# Patient Record
Sex: MALE | Race: WHITE | NOT HISPANIC OR LATINO | Employment: UNEMPLOYED | URBAN - METROPOLITAN AREA
[De-identification: names, ages, dates, MRNs, and addresses within clinical notes are randomized per-mention and may not be internally consistent; named-entity substitution may affect disease eponyms.]

---

## 2018-10-30 ENCOUNTER — HOSPITAL ENCOUNTER (OUTPATIENT)
Dept: WOUND CARE | Facility: HOSPITAL | Age: 58
Discharge: HOME | End: 2018-10-30
Attending: SURGERY | Admitting: SURGERY

## 2018-11-02 ENCOUNTER — HOSPITAL ENCOUNTER (OUTPATIENT)
Dept: WOUND CARE | Facility: HOSPITAL | Age: 58
Discharge: HOME | End: 2018-11-02
Attending: SURGERY | Admitting: SURGERY

## 2018-11-06 ENCOUNTER — HOSPITAL ENCOUNTER (OUTPATIENT)
Dept: WOUND CARE | Facility: HOSPITAL | Age: 58
Discharge: HOME | End: 2018-11-06
Attending: SURGERY | Admitting: SURGERY

## 2018-11-13 ENCOUNTER — HOSPITAL ENCOUNTER (OUTPATIENT)
Dept: WOUND CARE | Facility: HOSPITAL | Age: 58
Discharge: HOME | End: 2018-11-13
Attending: SURGERY | Admitting: SURGERY

## 2018-11-20 ENCOUNTER — HOSPITAL ENCOUNTER (OUTPATIENT)
Dept: WOUND CARE | Facility: HOSPITAL | Age: 58
Discharge: HOME | End: 2018-11-20
Attending: SURGERY | Admitting: SURGERY

## 2018-11-28 ENCOUNTER — HOSPITAL ENCOUNTER (OUTPATIENT)
Dept: WOUND CARE | Facility: HOSPITAL | Age: 58
Discharge: HOME | End: 2018-11-28
Attending: SURGERY | Admitting: SURGERY

## 2018-12-04 ENCOUNTER — HOSPITAL ENCOUNTER (OUTPATIENT)
Dept: WOUND CARE | Facility: HOSPITAL | Age: 58
Discharge: HOME | End: 2018-12-04
Attending: SURGERY | Admitting: SURGERY

## 2018-12-18 ENCOUNTER — HOSPITAL ENCOUNTER (OUTPATIENT)
Dept: WOUND CARE | Facility: HOSPITAL | Age: 58
Discharge: HOME | End: 2018-12-18
Attending: SURGERY | Admitting: SURGERY

## 2018-12-21 ENCOUNTER — HOSPITAL ENCOUNTER (OUTPATIENT)
Dept: WOUND CARE | Facility: HOSPITAL | Age: 58
Discharge: HOME | End: 2018-12-21
Attending: SURGERY | Admitting: SURGERY

## 2018-12-28 ENCOUNTER — HOSPITAL ENCOUNTER (OUTPATIENT)
Dept: WOUND CARE | Facility: HOSPITAL | Age: 58
Discharge: HOME | End: 2018-12-28
Attending: SURGERY | Admitting: SURGERY

## 2019-01-03 ENCOUNTER — HOSPITAL ENCOUNTER (OUTPATIENT)
Dept: WOUND CARE | Facility: HOSPITAL | Age: 59
Discharge: HOME | End: 2019-01-03
Attending: SURGERY | Admitting: SURGERY

## 2019-01-04 ENCOUNTER — HOSPITAL ENCOUNTER (OUTPATIENT)
Dept: WOUND CARE | Facility: HOSPITAL | Age: 59
Discharge: HOME | End: 2019-01-04
Attending: SURGERY | Admitting: SURGERY

## 2019-01-10 ENCOUNTER — HOSPITAL ENCOUNTER (OUTPATIENT)
Dept: WOUND CARE | Facility: HOSPITAL | Age: 59
Discharge: HOME | End: 2019-01-10
Attending: SURGERY | Admitting: SURGERY

## 2019-10-07 ENCOUNTER — OFFICE VISIT (OUTPATIENT)
Dept: WOUND CARE | Facility: HOSPITAL | Age: 59
End: 2019-10-07

## 2019-10-07 PROCEDURE — G0463 HOSPITAL OUTPT CLINIC VISIT: HCPCS

## 2019-10-10 ENCOUNTER — OFFICE VISIT (OUTPATIENT)
Dept: WOUND CARE | Facility: HOSPITAL | Age: 59
End: 2019-10-10

## 2019-10-10 DIAGNOSIS — L97.511 DIABETIC ULCER OF RIGHT FOOT ASSOCIATED WITH TYPE 2 DIABETES MELLITUS, LIMITED TO BREAKDOWN OF SKIN, UNSPECIFIED PART OF FOOT (HCC): ICD-10-CM

## 2019-10-10 DIAGNOSIS — E11.621 DIABETIC ULCER OF RIGHT FOOT ASSOCIATED WITH TYPE 2 DIABETES MELLITUS, LIMITED TO BREAKDOWN OF SKIN, UNSPECIFIED PART OF FOOT (HCC): ICD-10-CM

## 2019-10-10 DIAGNOSIS — L97.511 RIGHT FOOT ULCER, LIMITED TO BREAKDOWN OF SKIN (HCC): ICD-10-CM

## 2019-10-10 DIAGNOSIS — E66.01 MORBID OBESITY (HCC): ICD-10-CM

## 2019-10-10 PROCEDURE — 29445 APPL RIGID TOT CNTC LEG CAST: CPT | Performed by: NURSE PRACTITIONER

## 2019-10-14 ENCOUNTER — OFFICE VISIT (OUTPATIENT)
Dept: WOUND CARE | Facility: HOSPITAL | Age: 59
End: 2019-10-14

## 2019-10-21 ENCOUNTER — OFFICE VISIT (OUTPATIENT)
Dept: WOUND CARE | Facility: HOSPITAL | Age: 59
End: 2019-10-21

## 2019-10-21 PROCEDURE — G0463 HOSPITAL OUTPT CLINIC VISIT: HCPCS

## 2019-11-14 ENCOUNTER — LAB REQUISITION (OUTPATIENT)
Dept: LAB | Facility: HOSPITAL | Age: 59
End: 2019-11-14

## 2019-11-14 ENCOUNTER — HOSPITAL ENCOUNTER (INPATIENT)
Facility: HOSPITAL | Age: 59
LOS: 5 days | Discharge: HOME | End: 2019-11-19
Attending: HOSPITALIST | Admitting: HOSPITALIST

## 2019-11-14 ENCOUNTER — OFFICE VISIT (OUTPATIENT)
Dept: WOUND CARE | Facility: HOSPITAL | Age: 59
End: 2019-11-14

## 2019-11-14 DIAGNOSIS — L97.511 NON-PRESSURE CHRONIC ULCER OF OTHER PART OF RIGHT FOOT LIMITED TO BREAKDOWN OF SKIN (HCC): ICD-10-CM

## 2019-11-14 PROBLEM — L97.509 DIABETIC FOOT ULCER: Status: ACTIVE | Noted: 2019-11-14

## 2019-11-14 PROBLEM — E11.621 DIABETIC FOOT ULCER (HCC): Status: ACTIVE | Noted: 2019-11-14

## 2019-11-14 LAB — GLUCOSE BLDC GLUCOMTR-MCNC: 116 MG/DL (ref 70–105)

## 2019-11-14 PROCEDURE — 83605 ASSAY OF LACTIC ACID: CPT | Performed by: HOSPITALIST

## 2019-11-14 PROCEDURE — 86140 C-REACTIVE PROTEIN: CPT | Performed by: HOSPITALIST

## 2019-11-14 PROCEDURE — 99223 1ST HOSP IP/OBS HIGH 75: CPT | Performed by: HOSPITALIST

## 2019-11-14 PROCEDURE — G0378 HOSPITAL OBSERVATION PER HR: HCPCS

## 2019-11-14 PROCEDURE — 82550 ASSAY OF CK (CPK): CPT | Performed by: HOSPITALIST

## 2019-11-14 PROCEDURE — 84145 PROCALCITONIN (PCT): CPT | Performed by: HOSPITALIST

## 2019-11-14 PROCEDURE — 87040 BLOOD CULTURE FOR BACTERIA: CPT | Performed by: HOSPITALIST

## 2019-11-14 PROCEDURE — 87070 CULTURE OTHR SPECIMN AEROBIC: CPT | Performed by: SURGERY

## 2019-11-14 PROCEDURE — 87205 SMEAR GRAM STAIN: CPT | Performed by: SURGERY

## 2019-11-14 PROCEDURE — 87147 CULTURE TYPE IMMUNOLOGIC: CPT | Performed by: SURGERY

## 2019-11-14 PROCEDURE — 85025 COMPLETE CBC W/AUTO DIFF WBC: CPT | Performed by: HOSPITALIST

## 2019-11-14 PROCEDURE — 85652 RBC SED RATE AUTOMATED: CPT | Performed by: HOSPITALIST

## 2019-11-14 PROCEDURE — G0463 HOSPITAL OUTPT CLINIC VISIT: HCPCS

## 2019-11-14 PROCEDURE — 87186 SC STD MICRODIL/AGAR DIL: CPT | Performed by: SURGERY

## 2019-11-14 PROCEDURE — 82962 GLUCOSE BLOOD TEST: CPT | Performed by: SURGERY

## 2019-11-14 PROCEDURE — 80053 COMPREHEN METABOLIC PANEL: CPT | Performed by: HOSPITALIST

## 2019-11-14 RX ORDER — OXYCODONE HYDROCHLORIDE 5 MG/1
10 TABLET ORAL EVERY 4 HOURS PRN
Status: DISCONTINUED | OUTPATIENT
Start: 2019-11-14 | End: 2019-11-19 | Stop reason: HOSPADM

## 2019-11-14 RX ORDER — ONDANSETRON 2 MG/ML
4 INJECTION INTRAMUSCULAR; INTRAVENOUS EVERY 4 HOURS PRN
Status: DISCONTINUED | OUTPATIENT
Start: 2019-11-14 | End: 2019-11-19 | Stop reason: HOSPADM

## 2019-11-14 RX ORDER — DEXTROSE MONOHYDRATE 25 G/50ML
25 INJECTION, SOLUTION INTRAVENOUS
Status: DISCONTINUED | OUTPATIENT
Start: 2019-11-14 | End: 2019-11-19 | Stop reason: HOSPADM

## 2019-11-14 RX ORDER — RANITIDINE 150 MG/1
150 TABLET ORAL DAILY
COMMUNITY
End: 2021-07-06

## 2019-11-14 RX ORDER — PANTOPRAZOLE SODIUM 40 MG/1
40 TABLET, DELAYED RELEASE ORAL
Status: DISCONTINUED | OUTPATIENT
Start: 2019-11-15 | End: 2019-11-19 | Stop reason: HOSPADM

## 2019-11-14 RX ORDER — ATORVASTATIN CALCIUM 40 MG/1
40 TABLET, FILM COATED ORAL NIGHTLY
Status: DISCONTINUED | OUTPATIENT
Start: 2019-11-15 | End: 2019-11-19 | Stop reason: HOSPADM

## 2019-11-14 RX ORDER — PIOGLITAZONEHYDROCHLORIDE 30 MG/1
30 TABLET ORAL EVERY MORNING
COMMUNITY

## 2019-11-14 RX ORDER — ACETAMINOPHEN 650 MG/1
650 SUPPOSITORY RECTAL EVERY 4 HOURS PRN
Status: DISCONTINUED | OUTPATIENT
Start: 2019-11-14 | End: 2019-11-19 | Stop reason: HOSPADM

## 2019-11-14 RX ORDER — SODIUM CHLORIDE 0.9 % (FLUSH) 0.9 %
10 SYRINGE (ML) INJECTION AS NEEDED
Status: DISCONTINUED | OUTPATIENT
Start: 2019-11-14 | End: 2019-11-19 | Stop reason: HOSPADM

## 2019-11-14 RX ORDER — NICOTINE POLACRILEX 4 MG
15 LOZENGE BUCCAL
Status: DISCONTINUED | OUTPATIENT
Start: 2019-11-14 | End: 2019-11-19 | Stop reason: HOSPADM

## 2019-11-14 RX ORDER — SODIUM CHLORIDE 0.9 % (FLUSH) 0.9 %
10 SYRINGE (ML) INJECTION EVERY 12 HOURS SCHEDULED
Status: DISCONTINUED | OUTPATIENT
Start: 2019-11-15 | End: 2019-11-19 | Stop reason: HOSPADM

## 2019-11-14 RX ORDER — METOPROLOL SUCCINATE 50 MG/1
50 TABLET, EXTENDED RELEASE ORAL DAILY
Status: DISCONTINUED | OUTPATIENT
Start: 2019-11-15 | End: 2019-11-19 | Stop reason: HOSPADM

## 2019-11-14 RX ORDER — METOPROLOL SUCCINATE 50 MG/1
100 TABLET, EXTENDED RELEASE ORAL
COMMUNITY
End: 2021-07-06 | Stop reason: ALTCHOICE

## 2019-11-14 RX ORDER — TRIAMCINOLONE ACETONIDE 5 MG/G
CREAM TOPICAL 3 TIMES DAILY PRN
COMMUNITY
End: 2020-11-25

## 2019-11-14 RX ORDER — AMMONIUM LACTATE 120 MG/G
1 CREAM TOPICAL AS NEEDED
COMMUNITY
End: 2020-11-25

## 2019-11-14 RX ORDER — FAMOTIDINE 20 MG/1
20 TABLET, FILM COATED ORAL
Status: DISCONTINUED | OUTPATIENT
Start: 2019-11-15 | End: 2019-11-19 | Stop reason: HOSPADM

## 2019-11-14 RX ORDER — ACETAMINOPHEN 325 MG/1
650 TABLET ORAL EVERY 4 HOURS PRN
Status: DISCONTINUED | OUTPATIENT
Start: 2019-11-14 | End: 2019-11-19 | Stop reason: HOSPADM

## 2019-11-14 RX ORDER — OXYCODONE HYDROCHLORIDE 5 MG/1
5 TABLET ORAL EVERY 4 HOURS PRN
Status: DISCONTINUED | OUTPATIENT
Start: 2019-11-14 | End: 2019-11-19 | Stop reason: HOSPADM

## 2019-11-14 RX ORDER — CHOLECALCIFEROL (VITAMIN D3) 125 MCG
5 CAPSULE ORAL NIGHTLY PRN
Status: DISCONTINUED | OUTPATIENT
Start: 2019-11-14 | End: 2019-11-19 | Stop reason: HOSPADM

## 2019-11-14 RX ORDER — ACETAMINOPHEN 160 MG/5ML
650 SOLUTION ORAL EVERY 4 HOURS PRN
Status: DISCONTINUED | OUTPATIENT
Start: 2019-11-14 | End: 2019-11-19 | Stop reason: HOSPADM

## 2019-11-14 RX ORDER — PANTOPRAZOLE SODIUM 40 MG/1
40 TABLET, DELAYED RELEASE ORAL DAILY
COMMUNITY

## 2019-11-14 RX ORDER — INSULIN GLARGINE 100 [IU]/ML
30 INJECTION, SOLUTION SUBCUTANEOUS 2 TIMES DAILY WITH MEALS
Status: DISCONTINUED | OUTPATIENT
Start: 2019-11-15 | End: 2019-11-19 | Stop reason: HOSPADM

## 2019-11-14 RX ORDER — ASPIRIN 81 MG/1
81 TABLET, CHEWABLE ORAL DAILY
COMMUNITY

## 2019-11-14 RX ORDER — ATORVASTATIN CALCIUM 40 MG/1
40 TABLET, FILM COATED ORAL DAILY
COMMUNITY

## 2019-11-14 RX ORDER — LOSARTAN POTASSIUM 50 MG/1
100 TABLET ORAL DAILY
Status: DISCONTINUED | OUTPATIENT
Start: 2019-11-15 | End: 2019-11-19 | Stop reason: HOSPADM

## 2019-11-14 RX ORDER — INSULIN DEGLUDEC 100 U/ML
152 INJECTION, SOLUTION SUBCUTANEOUS DAILY
COMMUNITY
End: 2021-07-06 | Stop reason: ALTCHOICE

## 2019-11-14 RX ORDER — BISACODYL 5 MG/1
5 TABLET, DELAYED RELEASE ORAL DAILY PRN
Status: DISCONTINUED | OUTPATIENT
Start: 2019-11-14 | End: 2019-11-19 | Stop reason: HOSPADM

## 2019-11-14 RX ORDER — BISACODYL 10 MG
10 SUPPOSITORY, RECTAL RECTAL DAILY PRN
Status: DISCONTINUED | OUTPATIENT
Start: 2019-11-14 | End: 2019-11-19 | Stop reason: HOSPADM

## 2019-11-15 ENCOUNTER — APPOINTMENT (OUTPATIENT)
Dept: MRI IMAGING | Facility: HOSPITAL | Age: 59
End: 2019-11-15

## 2019-11-15 ENCOUNTER — APPOINTMENT (OUTPATIENT)
Dept: GENERAL RADIOLOGY | Facility: HOSPITAL | Age: 59
End: 2019-11-15

## 2019-11-15 PROBLEM — K21.9 GERD (GASTROESOPHAGEAL REFLUX DISEASE): Status: ACTIVE | Noted: 2019-11-15

## 2019-11-15 PROBLEM — E11.610 CHARCOT FOOT DUE TO DIABETES MELLITUS: Status: ACTIVE | Noted: 2019-11-15

## 2019-11-15 PROBLEM — E11.9 DIABETES MELLITUS: Status: ACTIVE | Noted: 2019-11-15

## 2019-11-15 PROBLEM — E78.5 HYPERLIPIDEMIA: Status: ACTIVE | Noted: 2019-11-15

## 2019-11-15 PROBLEM — I10 HYPERTENSION: Status: ACTIVE | Noted: 2019-11-15

## 2019-11-15 LAB
ALBUMIN SERPL-MCNC: 3.9 G/DL (ref 3.5–5.2)
ALBUMIN/GLOB SERPL: 1.2 G/DL
ALP SERPL-CCNC: 56 U/L (ref 39–117)
ALT SERPL W P-5'-P-CCNC: 36 U/L (ref 1–41)
ANION GAP SERPL CALCULATED.3IONS-SCNC: 12 MMOL/L (ref 5–15)
ANION GAP SERPL CALCULATED.3IONS-SCNC: 12 MMOL/L (ref 5–15)
AST SERPL-CCNC: 22 U/L (ref 1–40)
BASOPHILS # BLD AUTO: 0 10*3/MM3 (ref 0–0.2)
BASOPHILS # BLD AUTO: 0.1 10*3/MM3 (ref 0–0.2)
BASOPHILS NFR BLD AUTO: 0.4 % (ref 0–1.5)
BASOPHILS NFR BLD AUTO: 0.5 % (ref 0–1.5)
BILIRUB SERPL-MCNC: 0.6 MG/DL (ref 0.2–1.2)
BUN BLD-MCNC: 15 MG/DL (ref 6–20)
BUN BLD-MCNC: 16 MG/DL (ref 6–20)
BUN/CREAT SERPL: 13.3 (ref 7–25)
BUN/CREAT SERPL: 13.4 (ref 7–25)
CALCIUM SPEC-SCNC: 9.4 MG/DL (ref 8.6–10.5)
CALCIUM SPEC-SCNC: 9.5 MG/DL (ref 8.6–10.5)
CHLORIDE SERPL-SCNC: 101 MMOL/L (ref 98–107)
CHLORIDE SERPL-SCNC: 99 MMOL/L (ref 98–107)
CHOLEST SERPL-MCNC: 107 MG/DL (ref 0–200)
CK SERPL-CCNC: 124 U/L (ref 20–200)
CO2 SERPL-SCNC: 27 MMOL/L (ref 22–29)
CO2 SERPL-SCNC: 27 MMOL/L (ref 22–29)
CREAT BLD-MCNC: 1.12 MG/DL (ref 0.76–1.27)
CREAT BLD-MCNC: 1.2 MG/DL (ref 0.76–1.27)
CRP SERPL-MCNC: 8.13 MG/DL (ref 0–0.5)
D-LACTATE SERPL-SCNC: 1.7 MMOL/L (ref 0.5–2)
DEPRECATED RDW RBC AUTO: 45.1 FL (ref 37–54)
DEPRECATED RDW RBC AUTO: 45.5 FL (ref 37–54)
EOSINOPHIL # BLD AUTO: 0.2 10*3/MM3 (ref 0–0.4)
EOSINOPHIL # BLD AUTO: 0.3 10*3/MM3 (ref 0–0.4)
EOSINOPHIL NFR BLD AUTO: 1.4 % (ref 0.3–6.2)
EOSINOPHIL NFR BLD AUTO: 2.7 % (ref 0.3–6.2)
ERYTHROCYTE [DISTWIDTH] IN BLOOD BY AUTOMATED COUNT: 14.1 % (ref 12.3–15.4)
ERYTHROCYTE [DISTWIDTH] IN BLOOD BY AUTOMATED COUNT: 14.2 % (ref 12.3–15.4)
ERYTHROCYTE [SEDIMENTATION RATE] IN BLOOD: 48 MM/HR (ref 0–20)
GFR SERPL CREATININE-BSD FRML MDRD: 62 ML/MIN/1.73
GFR SERPL CREATININE-BSD FRML MDRD: 67 ML/MIN/1.73
GLOBULIN UR ELPH-MCNC: 3.2 GM/DL
GLUCOSE BLD-MCNC: 169 MG/DL (ref 65–99)
GLUCOSE BLD-MCNC: 228 MG/DL (ref 65–99)
GLUCOSE BLDC GLUCOMTR-MCNC: 130 MG/DL (ref 70–105)
GLUCOSE BLDC GLUCOMTR-MCNC: 138 MG/DL (ref 70–105)
GLUCOSE BLDC GLUCOMTR-MCNC: 234 MG/DL (ref 70–105)
GLUCOSE BLDC GLUCOMTR-MCNC: 252 MG/DL (ref 70–105)
HBA1C MFR BLD: 6.7 % (ref 3.5–5.6)
HCT VFR BLD AUTO: 38.6 % (ref 37.5–51)
HCT VFR BLD AUTO: 39 % (ref 37.5–51)
HDLC SERPL-MCNC: 41 MG/DL (ref 40–60)
HGB BLD-MCNC: 13.1 G/DL (ref 13–17.7)
HGB BLD-MCNC: 13.2 G/DL (ref 13–17.7)
LDLC SERPL CALC-MCNC: 55 MG/DL (ref 0–100)
LDLC/HDLC SERPL: 1.34 {RATIO}
LYMPHOCYTES # BLD AUTO: 2 10*3/MM3 (ref 0.7–3.1)
LYMPHOCYTES # BLD AUTO: 2.2 10*3/MM3 (ref 0.7–3.1)
LYMPHOCYTES NFR BLD AUTO: 17.3 % (ref 19.6–45.3)
LYMPHOCYTES NFR BLD AUTO: 21 % (ref 19.6–45.3)
MAGNESIUM SERPL-MCNC: 1.7 MG/DL (ref 1.6–2.6)
MCH RBC QN AUTO: 30.5 PG (ref 26.6–33)
MCH RBC QN AUTO: 30.8 PG (ref 26.6–33)
MCHC RBC AUTO-ENTMCNC: 33.8 G/DL (ref 31.5–35.7)
MCHC RBC AUTO-ENTMCNC: 34 G/DL (ref 31.5–35.7)
MCV RBC AUTO: 90.4 FL (ref 79–97)
MCV RBC AUTO: 90.6 FL (ref 79–97)
MONOCYTES # BLD AUTO: 1.2 10*3/MM3 (ref 0.1–0.9)
MONOCYTES # BLD AUTO: 1.3 10*3/MM3 (ref 0.1–0.9)
MONOCYTES NFR BLD AUTO: 10.3 % (ref 5–12)
MONOCYTES NFR BLD AUTO: 12.8 % (ref 5–12)
NEUTROPHILS # BLD AUTO: 6.5 10*3/MM3 (ref 1.7–7)
NEUTROPHILS # BLD AUTO: 8.1 10*3/MM3 (ref 1.7–7)
NEUTROPHILS NFR BLD AUTO: 63.1 % (ref 42.7–76)
NEUTROPHILS NFR BLD AUTO: 70.5 % (ref 42.7–76)
NRBC BLD AUTO-RTO: 0 /100 WBC (ref 0–0.2)
NRBC BLD AUTO-RTO: 0.1 /100 WBC (ref 0–0.2)
PHOSPHATE SERPL-MCNC: 3.7 MG/DL (ref 2.5–4.5)
PLATELET # BLD AUTO: 174 10*3/MM3 (ref 140–450)
PLATELET # BLD AUTO: 182 10*3/MM3 (ref 140–450)
PMV BLD AUTO: 9.1 FL (ref 6–12)
PMV BLD AUTO: 9.2 FL (ref 6–12)
POTASSIUM BLD-SCNC: 3.8 MMOL/L (ref 3.5–5.2)
POTASSIUM BLD-SCNC: 4.3 MMOL/L (ref 3.5–5.2)
PROCALCITONIN SERPL-MCNC: 0.12 NG/ML (ref 0.1–0.25)
PROT SERPL-MCNC: 7.1 G/DL (ref 6–8.5)
RBC # BLD AUTO: 4.26 10*6/MM3 (ref 4.14–5.8)
RBC # BLD AUTO: 4.31 10*6/MM3 (ref 4.14–5.8)
SODIUM BLD-SCNC: 138 MMOL/L (ref 136–145)
SODIUM BLD-SCNC: 140 MMOL/L (ref 136–145)
TRIGL SERPL-MCNC: 56 MG/DL (ref 0–150)
VLDLC SERPL-MCNC: 11.2 MG/DL
WBC NRBC COR # BLD: 10.3 10*3/MM3 (ref 3.4–10.8)
WBC NRBC COR # BLD: 11.5 10*3/MM3 (ref 3.4–10.8)

## 2019-11-15 PROCEDURE — 25010000002 MAGNESIUM SULFATE 2 GM/50ML SOLUTION: Performed by: HOSPITALIST

## 2019-11-15 PROCEDURE — 25010000002 VANCOMYCIN 10 G RECONSTITUTED SOLUTION: Performed by: HOSPITALIST

## 2019-11-15 PROCEDURE — 82962 GLUCOSE BLOOD TEST: CPT

## 2019-11-15 PROCEDURE — 87186 SC STD MICRODIL/AGAR DIL: CPT | Performed by: PODIATRIST

## 2019-11-15 PROCEDURE — 87147 CULTURE TYPE IMMUNOLOGIC: CPT | Performed by: PODIATRIST

## 2019-11-15 PROCEDURE — 87070 CULTURE OTHR SPECIMN AEROBIC: CPT | Performed by: PODIATRIST

## 2019-11-15 PROCEDURE — 73718 MRI LOWER EXTREMITY W/O DYE: CPT

## 2019-11-15 PROCEDURE — 63710000001 INSULIN LISPRO (HUMAN) PER 5 UNITS: Performed by: HOSPITALIST

## 2019-11-15 PROCEDURE — 83036 HEMOGLOBIN GLYCOSYLATED A1C: CPT | Performed by: HOSPITALIST

## 2019-11-15 PROCEDURE — 99232 SBSQ HOSP IP/OBS MODERATE 35: CPT | Performed by: HOSPITALIST

## 2019-11-15 PROCEDURE — 25010000002 CEFEPIME PER 500 MG: Performed by: HOSPITALIST

## 2019-11-15 PROCEDURE — G0378 HOSPITAL OBSERVATION PER HR: HCPCS

## 2019-11-15 PROCEDURE — 80048 BASIC METABOLIC PNL TOTAL CA: CPT | Performed by: HOSPITALIST

## 2019-11-15 PROCEDURE — 63710000001 INSULIN GLARGINE PER 5 UNITS: Performed by: HOSPITALIST

## 2019-11-15 PROCEDURE — 84100 ASSAY OF PHOSPHORUS: CPT | Performed by: HOSPITALIST

## 2019-11-15 PROCEDURE — 87205 SMEAR GRAM STAIN: CPT | Performed by: PODIATRIST

## 2019-11-15 PROCEDURE — 73630 X-RAY EXAM OF FOOT: CPT

## 2019-11-15 PROCEDURE — 85025 COMPLETE CBC W/AUTO DIFF WBC: CPT | Performed by: HOSPITALIST

## 2019-11-15 PROCEDURE — 80061 LIPID PANEL: CPT | Performed by: HOSPITALIST

## 2019-11-15 PROCEDURE — 83735 ASSAY OF MAGNESIUM: CPT | Performed by: HOSPITALIST

## 2019-11-15 RX ORDER — MAGNESIUM SULFATE HEPTAHYDRATE 40 MG/ML
2 INJECTION, SOLUTION INTRAVENOUS ONCE
Status: COMPLETED | OUTPATIENT
Start: 2019-11-15 | End: 2019-11-15

## 2019-11-15 RX ORDER — CLINDAMYCIN PHOSPHATE 900 MG/50ML
900 INJECTION, SOLUTION INTRAVENOUS EVERY 8 HOURS
Status: DISCONTINUED | OUTPATIENT
Start: 2019-11-15 | End: 2019-11-17

## 2019-11-15 RX ORDER — VANCOMYCIN HYDROCHLORIDE
1500 EVERY 12 HOURS
Status: DISCONTINUED | OUTPATIENT
Start: 2019-11-15 | End: 2019-11-16

## 2019-11-15 RX ADMIN — Medication 10 ML: at 21:55

## 2019-11-15 RX ADMIN — CEFEPIME 2 G: 2 INJECTION, POWDER, FOR SOLUTION INTRAVENOUS at 08:30

## 2019-11-15 RX ADMIN — MAGNESIUM SULFATE HEPTAHYDRATE 2 G: 40 INJECTION, SOLUTION INTRAVENOUS at 07:28

## 2019-11-15 RX ADMIN — LOSARTAN POTASSIUM 100 MG: 50 TABLET, FILM COATED ORAL at 08:31

## 2019-11-15 RX ADMIN — INSULIN LISPRO 4 UNITS: 100 INJECTION, SOLUTION INTRAVENOUS; SUBCUTANEOUS at 21:56

## 2019-11-15 RX ADMIN — VANCOMYCIN HYDROCHLORIDE 1500 MG: 10 INJECTION, POWDER, LYOPHILIZED, FOR SOLUTION INTRAVENOUS at 12:49

## 2019-11-15 RX ADMIN — Medication 10 ML: at 08:30

## 2019-11-15 RX ADMIN — CEFEPIME 2 G: 2 INJECTION, POWDER, FOR SOLUTION INTRAVENOUS at 16:32

## 2019-11-15 RX ADMIN — ATORVASTATIN CALCIUM 40 MG: 40 TABLET, FILM COATED ORAL at 21:55

## 2019-11-15 RX ADMIN — Medication 10 ML: at 00:46

## 2019-11-15 RX ADMIN — INSULIN GLARGINE 30 UNITS: 100 INJECTION, SOLUTION SUBCUTANEOUS at 17:48

## 2019-11-15 RX ADMIN — METOPROLOL SUCCINATE 50 MG: 50 TABLET, EXTENDED RELEASE ORAL at 08:31

## 2019-11-15 RX ADMIN — PANTOPRAZOLE SODIUM 40 MG: 40 TABLET, DELAYED RELEASE ORAL at 06:32

## 2019-11-15 RX ADMIN — CLINDAMYCIN PHOSPHATE 900 MG: 900 INJECTION, SOLUTION INTRAVENOUS at 17:47

## 2019-11-15 RX ADMIN — FAMOTIDINE 20 MG: 20 TABLET ORAL at 07:59

## 2019-11-15 RX ADMIN — CEFEPIME HYDROCHLORIDE 2 G: 2 INJECTION, POWDER, FOR SOLUTION INTRAVENOUS at 00:46

## 2019-11-15 RX ADMIN — FAMOTIDINE 20 MG: 20 TABLET ORAL at 17:46

## 2019-11-15 RX ADMIN — INSULIN LISPRO 3 UNITS: 100 INJECTION, SOLUTION INTRAVENOUS; SUBCUTANEOUS at 17:49

## 2019-11-15 RX ADMIN — INSULIN GLARGINE 30 UNITS: 100 INJECTION, SOLUTION SUBCUTANEOUS at 08:33

## 2019-11-15 RX ADMIN — VANCOMYCIN HYDROCHLORIDE 2000 MG: 10 INJECTION, POWDER, LYOPHILIZED, FOR SOLUTION INTRAVENOUS at 01:58

## 2019-11-15 NOTE — NURSING NOTE
No new orders placed on pt yet other than a diabetic diet which Dr. Deleon verbally said was ok to put in diet order and that it would be the NP that would see the pt tonight. MD Rea called back and said to check blood sugars and that he would come and see the pt soon, pt advised, will monitor

## 2019-11-15 NOTE — PROGRESS NOTES
"Pharmacokinetic Consult - Vancomycin Dosing (Initial Note)    Agapito Canseco has been consulted for pharmacy to dose vancomycin for diabetic foot ulcer.    Goal trough: 15-20 mg/L   Other antimicrobials: Cefepime 2 gm IV q8hr    Relevant clinical data and objective history reviewed:  59 y.o. 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    Lab Results   Component Value Date    WBC 11.50 (H) 11/14/2019     Temp Readings from Last 3 Encounters:   11/14/19 99 °F (37.2 °C) (Oral)    .     Assessment/Plan  Will start vancomycin 2,000 mg IV ( 99 kg DW x 20 mg ) x 1 then 1,500 mg ( 99 kg DW x 15 mg ) q12hr. Will monitor serum creatinine every 24 hours for the first 3 days then at least every 48 hours per dosing recommendations. Pharmacy will continue to follow daily while on vancomycin and adjust as needed.     Zee Kunz, [facility]      "

## 2019-11-15 NOTE — H&P
[facility] Medicine Services            Primary Care Provider:  Tete Blank MD    Patient Care Team:  Tete Blank MD as PCP - General    CHIEF COMPLAINT:     Leg ulcer    HISTORY OF PRESENT ILLNESS:    The patient is a pleasant 59-year-old male with history of IDDM complicated with neuropathy and right Charcot foot.  He has history of right foot plantar abscess and debridement about 1.5 years ago and recent debridement again of the right foot plantar surface that required cast.  The patient apparently noticed right foot first toe dorsal surface erythema about 5 days ago and progressively got worse therefore went to the podiatrist office on 11/14/2019 and was sent to [facility] for direct admission.    The patient denies recent fevers, chills, trauma to the foot, nausea, vomiting, constipation or diarrhea.    Diabetes mellitus was diagnosed in 1995 and has been on insulin for about 10 years and is on high doses of long-acting insulin.      Past Medical History:   Diagnosis Date   • Diabetes mellitus (CMS/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    Social History     Tobacco Use   • Smoking status: Never Smoker   • Smokeless tobacco: Never Used   Substance Use Topics   • Alcohol use: No     Frequency: Never   • Drug use: No       Medications Prior to Admission   Medication Sig Dispense Refill Last Dose   • ammonium lactate (AMLACTIN) 12 % cream Apply 1 application topically to the appropriate area as directed As Needed for Dry Skin (as needed for feet, gets OTC).      • aspirin 81 MG chewable tablet Chew 81 mg Daily.   11/14/2019 at 0900   • atorvastatin (LIPITOR)  40 MG tablet Take 40 mg by mouth Daily.   11/14/2019 at 0900   • Ergocalciferol (VITAMIN D2 PO) Take 1.25 mg by mouth 2 (Two) Times a Week. Patient taks on Sundays and Wednesdays 11/13/2019 at 0900   • Insulin Degludec (TRESIBA) 100 UNIT/ML solution injection Inject 152 Units under the skin into the appropriate area as directed Daily. Patient takes 2 injections of 76 units of Tresiba in the morning, 76 units on one side of the abdomen and 76 units on the other side of the abdomen for a total of 152 units daily in the AM   11/14/2019 at 0900   • Liraglutide (VICTOZA SC) Inject 1.8 mg under the skin into the appropriate area as directed Daily.   11/14/2019 at 0900   • LOSARTAN POTASSIUM PO Take 100 mg by mouth Daily.   11/13/2019 at 2100   • metFORMIN (GLUCOPHAGE) 1000 MG tablet Take 1,000 mg by mouth 2 (Two) Times a Day With Meals.   11/14/2019 at 0900   • metoprolol succinate XL (TOPROL-XL) 50 MG 24 hr tablet Take 50 mg by mouth Daily.   11/13/2019 at 2100   • pantoprazole (PROTONIX) 40 MG EC tablet Take 40 mg by mouth Daily.   11/14/2019 at 0900   • pioglitazone (ACTOS) 30 MG tablet Take 30 mg by mouth Daily.   11/14/2019 at 0900   • raNITIdine (ZANTAC) 150 MG tablet Take 150 mg by mouth Daily.   11/14/2019 at 0900   • triamcinolone (KENALOG) 0.1 % ointment Apply 1 application topically to the appropriate area as directed 3 (Three) Times a Day. Apply to right foot   11/14/2019   • triamcinolone (KENALOG) 0.5 % cream Apply  topically to the appropriate area as directed 3 (Three) Times a Day As Needed for Rash.          Allergies:  Patient has no known allergies.      There is no immunization history on file for this patient.        REVIEW OF SYSTEMS:     Review of Systems   All other systems reviewed and are negative.      Vital Signs  Temp:  [99 °F (37.2 °C)-99.5 °F (37.5 °C)] 99 °F (37.2 °C)  Heart Rate:  [] 99  Resp:  [16-18] 16  BP: (125-180)/(70-99) 125/70    Flowsheet Rows      First Filed Value  "  Admission Height  185.4 cm (73\") Documented at 11/14/2019 1848   Admission Weight  129 kg (285 lb 4.4 oz) Documented at 11/14/2019 1848           Physical Exam:    Physical Exam   Constitutional: He is oriented to person, place, and time. He appears well-developed and well-nourished.   Eyes: EOM are normal. Pupils are equal, round, and reactive to light.   Neck: Normal range of motion. Neck supple.   Cardiovascular: Normal rate and normal heart sounds.   Pulmonary/Chest: Effort normal and breath sounds normal.   Abdominal: Soft. Bowel sounds are normal.   Musculoskeletal:   Good range of motion all extremities.        Lymphadenopathy:     He has no cervical adenopathy.   Neurological: He is alert and oriented to person, place, and time.   Skin: Skin is warm and dry.   Psychiatric: He has a normal mood and affect. His speech is normal and behavior is normal. Judgment and thought content normal.       Results Review:      I reviewed the patient's new clinical results.    Lab Results (most recent)     None          Imaging Results (Most Recent)     None        reviewed    ECG/EMG Results (most recent)     None        reviewed              No image results found.      Assessment/Plan       Diabetic foot ulcer (CMS/HCC)    Hyperlipidemia    Charcot foot due to diabetes mellitus (CMS/HCC)          Assessment/Plan:     1.  Right foot first toe diabetic ulcer:  --Continue broad-spectrum antibiotics  --NPO  after midnight for or podiatry evaluation  --Trend inflammatory markers    2. IDDM complicated with Charcot foot  --Continue ISS and reduced insulin dose  --Hemoglobin A1c in a.m.  --On high-dose insulin at home    3.  VTE prophylaxis: SCD      I discussed the patients findings and my recommendations with patient.     Jaxon Campbell DO  11/15/19  6:44 AM  "

## 2019-11-15 NOTE — PLAN OF CARE
Problem: Patient Care Overview  Goal: Plan of Care Review  Outcome: Ongoing (interventions implemented as appropriate)   11/15/19 1525   Coping/Psychosocial   Plan of Care Reviewed With patient   Plan of Care Review   Progress no change   OTHER   Outcome Summary pt states no pain     Goal: Individualization and Mutuality  Outcome: Ongoing (interventions implemented as appropriate)    Goal: Discharge Needs Assessment  Outcome: Ongoing (interventions implemented as appropriate)    Goal: Interprofessional Rounds/Family Conf  Outcome: Ongoing (interventions implemented as appropriate)      Problem: Pain, Acute (Adult)  Goal: Identify Related Risk Factors and Signs and Symptoms  Outcome: Ongoing (interventions implemented as appropriate)    Goal: Acceptable Pain Control/Comfort Level  Outcome: Ongoing (interventions implemented as appropriate)

## 2019-11-15 NOTE — CONSULTS
Consults Podiatry    Patient Care Team:  Tete Blank MD as PCP - General    Chief complaint: New ulceration right great toe    Subjective :     History of Present Illness    Review of Systems   Constitutional: Negative.    HENT: Negative.    Eyes: Negative.    Respiratory: Negative.    Cardiovascular: Negative.    Musculoskeletal: Positive for gait problem.   Skin: Positive for wound.   Allergic/Immunologic: Negative.    Neurological: Positive for numbness.   Hematological: Negative.    Psychiatric/Behavioral: Negative.           Objective      Vital Signs  Temp:  [98.1 °F (36.7 °C)-99.5 °F (37.5 °C)] 98.1 °F (36.7 °C)  Heart Rate:  [] 82  Resp:  [16-20] 20  BP: (125-180)/(70-99) 127/73    Physical Exam   Constitutional: He is oriented to person, place, and time. He appears well-developed and well-nourished.   HENT:   Head: Normocephalic.   Eyes: EOM are normal.   Abdominal: Soft.   Musculoskeletal: He exhibits edema.   Neurological: He is alert and oriented to person, place, and time. A sensory deficit is present.   Skin: Skin is warm. There is erythema.   Psychiatric: He has a normal mood and affect.     Right lower extremity examination:  Vascular: Pulses are easily palpable and capillary fill times noted to be brisk in digits 1 through 5  Neurological: Light touch and protective sensation are noted to be absent  Skin: Right great toe has ulceration at ipj. There is localized erythema to the 1st mpj. Area was explored 1/4 cc pus expressed and deep culture taken.    Muscular skeletal: Charcot deformity right foot exostosis plantar lateral portion of his midfoot  Results Review:      XR Foot 3+ View Right [[id number]] (Order [id number])   Order   Status: Final result   Patient Location     Patient Class Location   Inpatient [facility] OBSERVATION, [address], [address]     [phone]      Study Notes        Shamika Wisdom on 11/15/2019  8:35 AM   New ulcer R great toe    Hx of:  Non healing foot  wounds    Diabetic    No Hx of Surgery on R foot      Appointment Information     PACS Images      Radiology Images   Study Result     DATE OF EXAM:  11/15/2019 8:35 AM     PROCEDURE:  XR FOOT 3+ VW RIGHT-     INDICATIONS:  New ulcer right great toe.     COMPARISON:  None available.     TECHNIQUE:   A minimum of three routine standard radiographic views were obtained of  the right foot.     FINDIXR Foot 3+ View Right [[id number]] (Order [id number])   Order   Status: Final result   Patient Location     Patient Class Location   Inpatient [facility] OBSERVATION, [address], 1     [phone]      Study Notes        Shamika Wisdom on 11/15/2019  8:35 AM   New ulcer R great toe    Hx of:  Non healing foot wounds    Diabetic    No Hx of Surgery on R foot      Appointment Information     PACS Images      Radiology Images   Study Result     DATE OF EXAM:  11/15/2019 8:35 AM     PROCEDURE:  XR FOOT 3+ VW RIGHT-     INDICATIONS:  New ulcer right great toe.     COMPARISON:  None available.     TECHNIQUE:   A minimum of three routine standard radiographic views were obtained of  the right foot.     FINDINGS:  Normal bone mineralization. No evidence of acute fracture. No lytic or  sclerotic changes to suggest osteomyelitis. Severe arthropathic changes  in the midfoot with destructive changes, disorganization, and  fragmentation of the tarsal bones, indicating Charcot arthropathy.  Associated pelvis planus. Moderate to advanced DJD at the great toe MTP  joint. Diffuse soft tissue swelling.      IMPRESSION:     1. Diffuse soft tissue swelling, without radiographic evidence of acute  fracture or osteomyelitis.  2. Severe chronic changes in the midfoot, indicating Charcot  arthropathy.  3. Moderate to advanced DJD the great toe MTP joint.     Electronically Signed By-David Patel On:11/15/2019 8:53 AM  This report was finalized               Assessment/Plan       Diabetic foot ulcer (CMS/HCC)    Hyperlipidemia    Charcot foot due to diabetes  mellitus (CMS/HCC)    Infection right great toe    Assessment & Plan  Mri to be ordered.   Possible or tomorrow based on mri findings  Consult ID  Betadine wet to dry bid       Santy Selby DPM  11/15/19  7:47 AM

## 2019-11-15 NOTE — PROGRESS NOTES
[facility] Medicine Services Daily Progress Note      Hospitalist Team  LOS 0 days      Patient Care Team:  Tete Blank MD as PCP - General        Chief Complaint / Subjective  Right foot great toe wound with redness advancing proximally in the right lower extremity to the medial distal thigh    Brief Synopsis of Hospital Course/HPI  Patient is a 59-year-old gentleman with history of insulin-dependent diabetes mellitus with neuropathy and right Charcot foot who had been having an area on the dorsal aspect of his right great toe that was rubbing his boot and causing a callus.  Despite using bandages the wound subsequently broke open and then became erythematous and the surrounding tissues.  Over the last 2 days he is noted redness spreading up his right leg to the medial distal right thigh.      Review of systems   12 point review of systems was reviewed and was negative except as above.      History reviewed. No pertinent family history.    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   • Elevated cholesterol    • GERD (gastroesophageal reflux disease)    • Hypertension        Social History     Socioeconomic History   • Marital status:      Spouse name: Not on file   • Number of children: Not on file   • Years of education: Not on file   • Highest education level: Not on file   Tobacco Use   • Smoking status: Never Smoker   • Smokeless tobacco: Never Used   Substance and Sexual Activity   • Alcohol use: No     Frequency: Never   • Drug use: No   • Sexual activity: Defer           Objective      Vital Signs  Temp:  [96.8 °F (36 °C)-99.5 °F (37.5 °C)] 98.2 °F (36.8 °C)  Heart Rate:  [] 84  Resp:  [16-20] 18  BP: (125-180)/(70-99) 145/75  Oxygen Therapy  SpO2: 97 %  Pulse Oximetry Type: Intermittent  Device (Oxygen Therapy): room air  Flowsheet Rows      First Filed Value   Admission Height   "185.4 cm (73\") Documented at 11/14/2019 1848   Admission Weight  129 kg (285 lb 4.4 oz) Documented at 11/14/2019 1848        Intake & Output (last 3 days)       11/12 0701 - 11/13 0700 11/13 0701 - 11/14 0700 11/14 0701 - 11/15 0700 11/15 0701 - 11/16 0700    P.O.   240 480    Total Intake(mL/kg)   240 (1.9) 480 (3.7)    Net   +240 +480            Urine Unmeasured Occurrence   2 x 1 x        Lines, Drains & Airways    Active LDAs     Name:   Placement date:   Placement time:   Site:   Days:    Peripheral IV 11/14/19 2004 Anterior;Left Forearm   11/14/19 2004    Forearm   less than 1                  Physical Exam:   Well-developed over nourished gentleman sitting up in bed awake and alert in no acute distress; lungs clear to auscultation bilaterally; CV regular rate and rhythm; abdomen soft and nontender; extremities with no edema or calf tenderness; right foot with open wound dorsal aspect of the right great toe with surrounding erythema with erythema and swelling of the foot, erythema of the lower leg, and erythema of the medial distal thigh.  Palpable pedal pulses bilaterally.      Procedures:              Results Review:     I reviewed the patient's new clinical results.    Results from last 7 days   Lab Units 11/15/19  0430 11/14/19  2355   WBC 10*3/mm3 10.30 11.50*   HEMOGLOBIN g/dL 13.1 13.2   HEMATOCRIT % 38.6 39.0   PLATELETS 10*3/mm3 174 182     Results from last 7 days   Lab Units 11/15/19  0430 11/14/19  2355   SODIUM mmol/L 140 138   POTASSIUM mmol/L 4.3 3.8   CHLORIDE mmol/L 101 99   CO2 mmol/L 27.0 27.0   BUN mg/dL 15 16   CREATININE mg/dL 1.12 1.20   CALCIUM mg/dL 9.4 9.5   BILIRUBIN mg/dL  --  0.6   ALK PHOS U/L  --  56   ALT (SGPT) U/L  --  36   AST (SGOT) U/L  --  22   GLUCOSE mg/dL 169* 228*     Results from last 7 days   Lab Units 11/15/19  0430   MAGNESIUM mg/dL 1.7     Lab Results   Component Value Date    CALCIUM 9.4 11/15/2019    PHOS 3.7 11/15/2019     Hemoglobin A1C   Date Value Ref " Range Status   11/15/2019 6.7 (H) 3.5 - 5.6 % Final                   Microbiology Results (last 10 days)     Procedure Component Value - Date/Time    Wound Culture - Wound, Foot, Right [[id number]]  (Abnormal) Collected:  11/14/19 1543    Lab Status:  Preliminary result Specimen:  Wound from Foot, Right Updated:  11/15/19 1443     Wound Culture Moderate growth (3+) Staphylococcus aureus     Gram Stain No WBCs seen      Occasional Gram positive cocci in clusters          ECG/EMG Results (most recent)     None                    Xr Foot 3+ View Right    Result Date: 11/15/2019   1. Diffuse soft tissue swelling, without radiographic evidence of acute fracture or osteomyelitis. 2. Severe chronic changes in the midfoot, indicating Charcot arthropathy. 3. Moderate to advanced DJD the great toe MTP joint.  Electronically Signed By-David Patel On:11/15/2019 8:53 AM This report was finalized on [id number] by  David Patel, .    Mri Foot Right Without Contrast    Result Date: 11/15/2019   1. Soft tissue wound at the dorsal great toe with diffuse soft tissue edema that likely represents cellulitis. No loculated fluid collection to suggest abscess. 2. No MR evidence of osteomyelitis. 3. Partially imaged severe chronic changes in the midfoot, consistent with Charcot arthropathy. 4. Moderate DJD at the great toe MTP joint. 5. Severe diffuse muscular fatty atrophy, likely neuropathic.    Electronically Signed ByRhea Patel On:11/15/2019 1:00 PM This report was finalized on [id number] by  David Patel, .      Xrays, labs reviewed personally by physician.    Medication Review:   I have reviewed the patient's current medication list      Scheduled Meds    [START ON 11/16/2019] !Vancomycin Level Draw Needed  Does not apply Once   [START ON 11/16/2019] !Vancomycin Level Draw Needed  Does not apply Once   atorvastatin 40 mg Oral Nightly   clindamycin 900 mg Intravenous Q8H   famotidine 20 mg Oral BID AC   insulin glargine 30  Units Subcutaneous BID With Meals   insulin lispro 0-7 Units Subcutaneous 4x Daily With Meals & Nightly   losartan 100 mg Oral Daily   metoprolol succinate XL 50 mg Oral Daily   pantoprazole 40 mg Oral Q AM   sodium chloride 10 mL Intravenous Q12H   vancomycin 1,500 mg Intravenous Q12H       Meds Infusions    Pharmacy to dose vancomycin        Meds PRN  acetaminophen **OR** acetaminophen **OR** acetaminophen  •  bisacodyl  •  bisacodyl  •  dextrose  •  dextrose  •  glucagon (human recombinant)  •  insulin lispro **AND** insulin lispro  •  melatonin  •  ondansetron  •  oxyCODONE  •  oxyCODONE  •  Pharmacy to dose vancomycin  •  sodium chloride        Assessment / Plan    Active Hospital Problems    Diagnosis  POA   • **Infected diabetic foot ulcer (CMS/HCC) [E11.621, L97.509]  Yes     Priority: High   • Type II diabetes mellitus (CMS/[facility]) [E11.9]  Yes     Priority: High   • Hyperlipidemia [E78.5]  Yes     Priority: Medium   • Charcot foot due to diabetes mellitus (CMS/[facility]) [E11.610]  Yes     Priority: Medium   • Essential hypertension [I10]  Yes     Priority: Medium   • GERD (gastroesophageal reflux disease) [K21.9]  Yes     Priority: Low      Resolved Hospital Problems   No resolved problems to display.     Plan: Infectious diseases and podiatry are consulting.  No surgical intervention is planned at this time.  The patient has no evidence of osteomyelitis and will be treated with IV antibiotics in the hospital for couple of days and then will switch to oral antibiotics once there are significant signs of improvement in the cellulitis.      Yesenia Deleon MD  11/15/19  5:33 PM

## 2019-11-15 NOTE — PLAN OF CARE
Problem: Patient Care Overview  Goal: Plan of Care Review  Outcome: Ongoing (interventions implemented as appropriate)   11/15/19 0433   Coping/Psychosocial   Plan of Care Reviewed With patient;spouse   Plan of Care Review   Progress no change   OTHER   Outcome Summary Pt has not complained of pain all night, will monitor. Will get podiatry consult today and getting IV antibiotics, will monitor     Goal: Discharge Needs Assessment  Outcome: Ongoing (interventions implemented as appropriate)   11/15/19 0433   Discharge Needs Assessment   Readmission Within the Last 30 Days no previous admission in last 30 days   Concerns to be Addressed no discharge needs identified;denies needs/concerns at this time   Patient/Family Anticipates Transition to home with family   Patient/Family Anticipated Services at Transition none  (sees [facility] out pt already)   Transportation Concerns car, none   Transportation Anticipated family or friend will provide   Anticipated Changes Related to Illness none   Equipment Needed After Discharge none       Problem: Pain, Acute (Adult)  Goal: Identify Related Risk Factors and Signs and Symptoms  Outcome: Ongoing (interventions implemented as appropriate)    Goal: Acceptable Pain Control/Comfort Level  Outcome: Ongoing (interventions implemented as appropriate)   11/15/19 0433   Pain, Acute (Adult)   Acceptable Pain Control/Comfort Level making progress toward outcome

## 2019-11-15 NOTE — CONSULTS
Infectious Diseases Consult Note    Referring Provider: Yesenia Deleon MD    Reason for Consultation: Right diabetic foot    Patient Care Team:  Tete Blank MD as PCP - General    Chief complaint right foot wound and right leg redness    Subjective     History of present illness:      This is 59-year-old white male with past medical history significant for diabetes mellitus and diabetic neuropathy and right Charcot foot.  Patient has a chronic ulceration of the plantar aspect of the right foot.  The patient has been followed by the wound care clinic.  Apparently started noticing ulceration on the big toe recently.  Things got worse and started noticing redness of the foot at all the way to the right leg.  Patient came to the wound care clinic yesterday and was transferred to the hospital for admission.  Wound culture from the wound care clinic is positive for Staphylococcus aureus.  The patient is hemodynamically stable.  The patient was started on IV vancomycin and IV cefepime.  Infectious disease consultation requested to evaluate him.  Patient also seen by podiatry service.  The patient had MRI done of his right foot earlier today and showed no osteomyelitis and there was no drainable fluid collection.        Review of Systems   Review of Systems   Constitutional: Negative.    HENT: Negative.    Eyes: Negative.    Respiratory: Negative.    Cardiovascular: Negative.    Gastrointestinal: Negative.    Genitourinary: Negative.    Musculoskeletal: Negative.    Skin: Positive for wound.   Neurological: Negative.    Hematological: Negative.    Psychiatric/Behavioral: Negative.        Medications  Medications Prior to Admission   Medication Sig Dispense Refill Last Dose   • ammonium lactate (AMLACTIN) 12 % cream Apply 1 application topically to the appropriate area as directed As Needed for Dry Skin (as needed for feet, gets OTC).      • aspirin 81 MG chewable tablet Chew 81 mg Daily.   11/14/2019 at 0900   •  atorvastatin (LIPITOR) 40 MG tablet Take 40 mg by mouth Daily.   11/14/2019 at 0900   • Ergocalciferol (VITAMIN D2 PO) Take 1.25 mg by mouth 2 (Two) Times a Week. Patient taks on Sundays and Wednesdays 11/13/2019 at 0900   • Insulin Degludec (TRESIBA) 100 UNIT/ML solution injection Inject 152 Units under the skin into the appropriate area as directed Daily. Patient takes 2 injections of 76 units of Tresiba in the morning, 76 units on one side of the abdomen and 76 units on the other side of the abdomen for a total of 152 units daily in the AM   11/14/2019 at 0900   • Liraglutide (VICTOZA SC) Inject 1.8 mg under the skin into the appropriate area as directed Daily.   11/14/2019 at 0900   • LOSARTAN POTASSIUM PO Take 100 mg by mouth Daily.   11/13/2019 at 2100   • metFORMIN (GLUCOPHAGE) 1000 MG tablet Take 1,000 mg by mouth 2 (Two) Times a Day With Meals.   11/14/2019 at 0900   • metoprolol succinate XL (TOPROL-XL) 50 MG 24 hr tablet Take 50 mg by mouth Daily.   11/13/2019 at 2100   • pantoprazole (PROTONIX) 40 MG EC tablet Take 40 mg by mouth Daily.   11/14/2019 at 0900   • pioglitazone (ACTOS) 30 MG tablet Take 30 mg by mouth Daily.   11/14/2019 at 0900   • raNITIdine (ZANTAC) 150 MG tablet Take 150 mg by mouth Daily.   11/14/2019 at 0900   • triamcinolone (KENALOG) 0.1 % ointment Apply 1 application topically to the appropriate area as directed 3 (Three) Times a Day. Apply to right foot   11/14/2019   • triamcinolone (KENALOG) 0.5 % cream Apply  topically to the appropriate area as directed 3 (Three) Times a Day As Needed for Rash.          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 No pertinent family history.    Social History   reports that he has never smoked. He has never used smokeless tobacco. He reports that he does not drink alcohol or use drugs.    Allergies  Patient has no known allergies.    Objective     Vital Signs   Vital Signs (last 24 hours)       11/14 0700  -  11/15 0659 11/15 0700  -  11/15 1657   Most Recent    Temp (°F) 99 -  99.5    96.8 -  98.2     98.2 (36.8)    Heart Rate 99 -  118    80 -  84     84    Resp 16 -  18    18 -  20     18    /70 -  180/99    127/73 -  145/75     145/75    SpO2 (%) 94 -  95    95 -  97     97          Physical Exam:  Physical Exam   Constitutional: He is oriented to person, place, and time. He appears well-developed and well-nourished.   HENT:   Head: Normocephalic and atraumatic.   Eyes: EOM are normal. Pupils are equal, round, and reactive to light.   Neck: Normal range of motion. Neck supple.   Cardiovascular: Normal rate, regular rhythm and normal heart sounds.   Pulmonary/Chest: Effort normal and breath sounds normal. No respiratory distress. He has no wheezes. He has no rales.   Abdominal: Soft. Bowel sounds are normal. He exhibits no distension and no mass. There is no tenderness. There is no rebound and no guarding.   Musculoskeletal: Normal range of motion. He exhibits no edema or deformity.   Right Charcot foot with a dry ulceration of plantar aspect with no signs of active infection surrounding it    Right foot erythema with streak all the way to the lower portion of the medial thigh.  There is a superficial wound on the dorsal aspect of the right big toe   Neurological: He is alert and oriented to person, place, and time. No cranial nerve deficit.   Skin: Skin is warm. No rash noted. No erythema.   Psychiatric: He has a normal mood and affect.   Nursing note and vitals reviewed.      Microbiology  Microbiology Results (last 10 days)     Procedure Component Value - Date/Time     Wound Culture - Wound, Foot, Right [[id number]]  (Abnormal) Collected:  11/14/19 1545    Lab Status:  Preliminary result Specimen:  Wound from Foot, Right Updated:  11/15/19 1443     Wound Culture Moderate growth (3+) Staphylococcus aureus     Gram Stain No WBCs seen      Occasional Gram positive cocci in clusters          Laboratory  Results from last 7 days   Lab Units 11/15/19  0430   WBC 10*3/mm3 10.30   HEMOGLOBIN g/dL 13.1   HEMATOCRIT % 38.6   PLATELETS 10*3/mm3 174     Results from last 7 days   Lab Units 11/15/19  0430   SODIUM mmol/L 140   POTASSIUM mmol/L 4.3   CHLORIDE mmol/L 101   CO2 mmol/L 27.0   BUN mg/dL 15   CREATININE mg/dL 1.12   GLUCOSE mg/dL 169*   CALCIUM mg/dL 9.4     Results from last 7 days   Lab Units 11/15/19  0430   SODIUM mmol/L 140   POTASSIUM mmol/L 4.3   CHLORIDE mmol/L 101   CO2 mmol/L 27.0   BUN mg/dL 15   CREATININE mg/dL 1.12   GLUCOSE mg/dL 169*   CALCIUM mg/dL 9.4     Results from last 7 days   Lab Units 11/14/19  2355   CK TOTAL U/L 124     Results from last 7 days   Lab Units 11/14/19  2355   SED RATE mm/hr 48*           Radiology  Imaging Results (Last 72 Hours)     Procedure Component Value Units Date/Time    MRI Foot Right Without Contrast [[id number]] Collected:  11/15/19 1239     Updated:  11/15/19 1302    Narrative:       MRI FOOT RIGHT WO CONTRAST-     Date of Exam: 11/15/2019 11:53 AM     Indication: Osteomyelitis suspected, foot swelling, diabetic.  Great toe  wound.     Comparison: Radiographs 11/15/2019.     Technique: Multiplanar multisequence images of the foot were performed  according to routine foot MRI protocol.     FINDINGS:  SOFT TISSUES  Soft tissue wound at the dorsum of the great toe at the level of the IP  joint. Diffuse soft tissue edema, greatest at the dorsum of the forefoot  and the great toe. No likely fluid collection to suggest abscess.  Mild  edema in the sinus tarsi. The partially imaged planar fascia is  unremarkable. Intermetatarsal spaces  are unremarkable.     BONES  No acute fracture. No T1 marrow replacement definitive for  osteomyelitis.  Chronic deformities in the tarsal bones and bases of the  metatarsals with subcortical cystic changes, consistent with chronic  Charcot arthropathy. No concerning osseous lesion.     JOINTS  Partially imaged severe arthropathic changes in the midfoot with  destructive changes, disorientation, dislocation, consistent with  chronic Charcot arthropathy. Moderate DJD at the great toe MTP joint. No  significant joint effusion.  No discrete intra-articular body.     TENDONS  The flexor and extensor tendons are intact.  The plantar plates are  grossly intact.     MUSCLES  Severe diffuse muscular fatty atrophy, likely neuropathic. No myositis.     LIGAMENTS  Likely chronic disruption of the Lisfranc ligament complex.  The  metatarsal collateral ligaments are intact.       Impression:          1. Soft tissue wound at the dorsal great toe with diffuse soft tissue  edema that likely represents cellulitis. No loculated fluid collection  to suggest abscess.  2. No MR evidence of osteomyelitis.  3. Partially imaged severe chronic changes in the midfoot, consistent  with Charcot arthropathy.  4. Moderate DJD at the great toe MTP joint.  5. Severe diffuse muscular fatty atrophy, likely neuropathic.           Electronically Signed By-David Patel On:11/15/2019 1:00 PM  This report was finalized on [id number] by  David Patel, .    XR Foot 3+ View Right [[id number]] Collected:  11/15/19 0843     Updated:  11/15/19 0855    Narrative:       DATE OF EXAM:  11/15/2019 8:35 AM     PROCEDURE:  XR FOOT 3+ VW RIGHT-     INDICATIONS:  New ulcer right great toe.     COMPARISON:  None available.     TECHNIQUE:   A minimum of three routine standard radiographic views were obtained of  the right foot.     FINDINGS:  Normal bone mineralization. No evidence of acute fracture. No lytic or  sclerotic changes to suggest osteomyelitis. Severe  arthropathic changes  in the midfoot with destructive changes, disorganization, and  fragmentation of the tarsal bones, indicating Charcot arthropathy.  Associated pelvis planus. Moderate to advanced DJD at the great toe MTP  joint. Diffuse soft tissue swelling.        Impression:          1. Diffuse soft tissue swelling, without radiographic evidence of acute  fracture or osteomyelitis.  2. Severe chronic changes in the midfoot, indicating Charcot  arthropathy.  3. Moderate to advanced DJD the great toe MTP joint.     Electronically Signed By-David Patel On:11/15/2019 8:53 AM  This report was finalized on [id number] by  David Patel, .          Cardiology      Results Review:  I have reviewed all clinical data, test, lab, and imaging results.       Schedule Meds    [START ON 11/16/2019] !Vancomycin Level Draw Needed  Does not apply Once   [START ON 11/16/2019] !Vancomycin Level Draw Needed  Does not apply Once   atorvastatin 40 mg Oral Nightly   cefepime 2 g Intravenous Q8H   famotidine 20 mg Oral BID AC   insulin glargine 30 Units Subcutaneous BID With Meals   insulin lispro 0-7 Units Subcutaneous 4x Daily With Meals & Nightly   losartan 100 mg Oral Daily   metoprolol succinate XL 50 mg Oral Daily   pantoprazole 40 mg Oral Q AM   sodium chloride 10 mL Intravenous Q12H   vancomycin 1,500 mg Intravenous Q12H       Infusion Meds    Pharmacy to dose vancomycin        PRN Meds  acetaminophen **OR** acetaminophen **OR** acetaminophen  •  bisacodyl  •  bisacodyl  •  dextrose  •  dextrose  •  glucagon (human recombinant)  •  insulin lispro **AND** insulin lispro  •  melatonin  •  ondansetron  •  oxyCODONE  •  oxyCODONE  •  Pharmacy to dose vancomycin  •  sodium chloride      Assessment/Plan       Assessment    Right leg cellulitis.  Most likely caused by Staphylococcus aureus.  The patient has streak in the upper leg.  This is typical for toxin producing organism.    Right diabetic foot with the right big toe  superficial ulceration.  This is probably the source of the cellulitis.  Wound culture is a growing Staphylococcus aureus.  MRI of the right foot showed no osteomyelitis or drainable collection    Right Charcot foot with a chronic ulceration of the plantar aspect of the right foot    Diabetes mellitus with diabetic neuropathy    Plan    Continue IV vancomycin waiting on the final wound culture results.  May de-escalate to Rocephin if it is not MRSA  Discontinue IV cefepime  Start clindamycin 900 mg IV every 8 hours.  He was clindamycin for short course until streak resolved  Labs in a.m.  This was discussed with podiatry service    Lisa Acosta MD  11/15/19  4:57 PM     Note is dictated utilizing voice recognition software/Dragon

## 2019-11-16 LAB
ALBUMIN SERPL-MCNC: 3.6 G/DL (ref 3.5–5.2)
ALBUMIN/GLOB SERPL: 1.2 G/DL
ALP SERPL-CCNC: 54 U/L (ref 39–117)
ALT SERPL W P-5'-P-CCNC: 31 U/L (ref 1–41)
ANION GAP SERPL CALCULATED.3IONS-SCNC: 10 MMOL/L (ref 5–15)
AST SERPL-CCNC: 21 U/L (ref 1–40)
BACTERIA SPEC AEROBE CULT: ABNORMAL
BASOPHILS # BLD AUTO: 0.1 10*3/MM3 (ref 0–0.2)
BASOPHILS NFR BLD AUTO: 1 % (ref 0–1.5)
BILIRUB SERPL-MCNC: 0.5 MG/DL (ref 0.2–1.2)
BUN BLD-MCNC: 16 MG/DL (ref 6–20)
BUN/CREAT SERPL: 16.3 (ref 7–25)
CALCIUM SPEC-SCNC: 9.1 MG/DL (ref 8.6–10.5)
CHLORIDE SERPL-SCNC: 106 MMOL/L (ref 98–107)
CO2 SERPL-SCNC: 25 MMOL/L (ref 22–29)
CREAT BLD-MCNC: 0.98 MG/DL (ref 0.76–1.27)
DEPRECATED RDW RBC AUTO: 44.2 FL (ref 37–54)
EOSINOPHIL # BLD AUTO: 0.3 10*3/MM3 (ref 0–0.4)
EOSINOPHIL NFR BLD AUTO: 4.2 % (ref 0.3–6.2)
ERYTHROCYTE [DISTWIDTH] IN BLOOD BY AUTOMATED COUNT: 13.9 % (ref 12.3–15.4)
GFR SERPL CREATININE-BSD FRML MDRD: 79 ML/MIN/1.73
GLOBULIN UR ELPH-MCNC: 3.1 GM/DL
GLUCOSE BLD-MCNC: 152 MG/DL (ref 65–99)
GLUCOSE BLDC GLUCOMTR-MCNC: 141 MG/DL (ref 70–105)
GLUCOSE BLDC GLUCOMTR-MCNC: 264 MG/DL (ref 70–105)
GLUCOSE BLDC GLUCOMTR-MCNC: 265 MG/DL (ref 70–105)
GLUCOSE BLDC GLUCOMTR-MCNC: 321 MG/DL (ref 70–105)
GRAM STN SPEC: ABNORMAL
GRAM STN SPEC: ABNORMAL
HCT VFR BLD AUTO: 36.1 % (ref 37.5–51)
HGB BLD-MCNC: 12.5 G/DL (ref 13–17.7)
LYMPHOCYTES # BLD AUTO: 1.6 10*3/MM3 (ref 0.7–3.1)
LYMPHOCYTES NFR BLD AUTO: 18.7 % (ref 19.6–45.3)
MAGNESIUM SERPL-MCNC: 2 MG/DL (ref 1.6–2.6)
MCH RBC QN AUTO: 31.2 PG (ref 26.6–33)
MCHC RBC AUTO-ENTMCNC: 34.7 G/DL (ref 31.5–35.7)
MCV RBC AUTO: 89.9 FL (ref 79–97)
MONOCYTES # BLD AUTO: 0.9 10*3/MM3 (ref 0.1–0.9)
MONOCYTES NFR BLD AUTO: 11.1 % (ref 5–12)
NEUTROPHILS # BLD AUTO: 5.4 10*3/MM3 (ref 1.7–7)
NEUTROPHILS NFR BLD AUTO: 65 % (ref 42.7–76)
NRBC BLD AUTO-RTO: 0 /100 WBC (ref 0–0.2)
PLATELET # BLD AUTO: 166 10*3/MM3 (ref 140–450)
PMV BLD AUTO: 8.9 FL (ref 6–12)
POTASSIUM BLD-SCNC: 4.1 MMOL/L (ref 3.5–5.2)
PROT SERPL-MCNC: 6.7 G/DL (ref 6–8.5)
RBC # BLD AUTO: 4.02 10*6/MM3 (ref 4.14–5.8)
SODIUM BLD-SCNC: 141 MMOL/L (ref 136–145)
VANCOMYCIN PEAK SERPL-MCNC: 17.1 MCG/ML (ref 20–40)
WBC NRBC COR # BLD: 8.3 10*3/MM3 (ref 3.4–10.8)

## 2019-11-16 PROCEDURE — 83735 ASSAY OF MAGNESIUM: CPT | Performed by: HOSPITALIST

## 2019-11-16 PROCEDURE — 80202 ASSAY OF VANCOMYCIN: CPT | Performed by: HOSPITALIST

## 2019-11-16 PROCEDURE — 63710000001 INSULIN GLARGINE PER 5 UNITS: Performed by: HOSPITALIST

## 2019-11-16 PROCEDURE — 63710000001 INSULIN LISPRO (HUMAN) PER 5 UNITS: Performed by: HOSPITALIST

## 2019-11-16 PROCEDURE — 99232 SBSQ HOSP IP/OBS MODERATE 35: CPT | Performed by: HOSPITALIST

## 2019-11-16 PROCEDURE — 85025 COMPLETE CBC W/AUTO DIFF WBC: CPT | Performed by: INTERNAL MEDICINE

## 2019-11-16 PROCEDURE — 25010000002 VANCOMYCIN 10 G RECONSTITUTED SOLUTION: Performed by: HOSPITALIST

## 2019-11-16 PROCEDURE — 25010000002 CEFTRIAXONE PER 250 MG: Performed by: INTERNAL MEDICINE

## 2019-11-16 PROCEDURE — G0378 HOSPITAL OBSERVATION PER HR: HCPCS

## 2019-11-16 PROCEDURE — 80053 COMPREHEN METABOLIC PANEL: CPT | Performed by: INTERNAL MEDICINE

## 2019-11-16 PROCEDURE — 82962 GLUCOSE BLOOD TEST: CPT

## 2019-11-16 RX ADMIN — FAMOTIDINE 20 MG: 20 TABLET ORAL at 18:37

## 2019-11-16 RX ADMIN — METOPROLOL SUCCINATE 50 MG: 50 TABLET, EXTENDED RELEASE ORAL at 09:43

## 2019-11-16 RX ADMIN — INSULIN GLARGINE 30 UNITS: 100 INJECTION, SOLUTION SUBCUTANEOUS at 09:44

## 2019-11-16 RX ADMIN — Medication: at 06:08

## 2019-11-16 RX ADMIN — PANTOPRAZOLE SODIUM 40 MG: 40 TABLET, DELAYED RELEASE ORAL at 06:09

## 2019-11-16 RX ADMIN — INSULIN GLARGINE 30 UNITS: 100 INJECTION, SOLUTION SUBCUTANEOUS at 18:36

## 2019-11-16 RX ADMIN — CLINDAMYCIN PHOSPHATE 900 MG: 900 INJECTION, SOLUTION INTRAVENOUS at 02:00

## 2019-11-16 RX ADMIN — Medication 10 ML: at 22:48

## 2019-11-16 RX ADMIN — CLINDAMYCIN PHOSPHATE 900 MG: 900 INJECTION, SOLUTION INTRAVENOUS at 18:37

## 2019-11-16 RX ADMIN — Medication 10 ML: at 09:49

## 2019-11-16 RX ADMIN — FAMOTIDINE 20 MG: 20 TABLET ORAL at 06:08

## 2019-11-16 RX ADMIN — ATORVASTATIN CALCIUM 40 MG: 40 TABLET, FILM COATED ORAL at 22:48

## 2019-11-16 RX ADMIN — INSULIN LISPRO 5 UNITS: 100 INJECTION, SOLUTION INTRAVENOUS; SUBCUTANEOUS at 12:53

## 2019-11-16 RX ADMIN — VANCOMYCIN HYDROCHLORIDE 1500 MG: 10 INJECTION, POWDER, LYOPHILIZED, FOR SOLUTION INTRAVENOUS at 01:49

## 2019-11-16 RX ADMIN — INSULIN LISPRO 4 UNITS: 100 INJECTION, SOLUTION INTRAVENOUS; SUBCUTANEOUS at 22:49

## 2019-11-16 RX ADMIN — INSULIN LISPRO 3 UNITS: 100 INJECTION, SOLUTION INTRAVENOUS; SUBCUTANEOUS at 18:38

## 2019-11-16 RX ADMIN — CEFTRIAXONE SODIUM 2 G: 2 INJECTION, POWDER, FOR SOLUTION INTRAMUSCULAR; INTRAVENOUS at 09:43

## 2019-11-16 RX ADMIN — LOSARTAN POTASSIUM 100 MG: 50 TABLET, FILM COATED ORAL at 09:43

## 2019-11-16 RX ADMIN — CLINDAMYCIN PHOSPHATE 900 MG: 900 INJECTION, SOLUTION INTRAVENOUS at 11:25

## 2019-11-16 NOTE — PROGRESS NOTES
Infectious Diseases Progress Note      LOS: 0 days   Patient Care Team:  Tete Blank MD as PCP - General    Chief Complaint: Right leg pain    Subjective     The patient had no fever during the last 24 hours.  The patient is hemodynamically stable.  The patient is tolerating his current antimicrobial therapy with no side effects.  The patient has improvement in the right upper leg pain.    Review of Systems:   Review of Systems   Constitutional: Negative.    HENT: Negative.    Eyes: Negative.    Respiratory: Negative.    Cardiovascular: Negative.    Gastrointestinal: Negative.    Genitourinary: Negative.    Musculoskeletal: Negative.    Skin: Positive for wound.   Neurological: Negative.    Hematological: Negative.    Psychiatric/Behavioral: Negative.         Objective     Vital Signs  Temp:  [97.4 °F (36.3 °C)-99.3 °F (37.4 °C)] 97.4 °F (36.3 °C)  Heart Rate:  [76-87] 76  Resp:  [16-20] 16  BP: (135-162)/(75-81) 162/79    Physical Exam:  Physical Exam   Constitutional: He is oriented to person, place, and time. He appears well-developed and well-nourished.   HENT:   Head: Normocephalic and atraumatic.   Eyes: EOM are normal. Pupils are equal, round, and reactive to light.   Neck: Normal range of motion. Neck supple.   Cardiovascular: Normal rate, regular rhythm and normal heart sounds.   Pulmonary/Chest: Effort normal and breath sounds normal. No respiratory distress. He has no wheezes. He has no rales.   Abdominal: Soft. Bowel sounds are normal. He exhibits no distension and no mass. There is no tenderness. There is no rebound and no guarding.   Musculoskeletal: Normal range of motion. He exhibits no edema or deformity.   Improvement of the right upper leg streak.  Persistent right lower leg erythema.   Neurological: He is alert and oriented to person, place, and time. No cranial nerve deficit.   Skin: Skin is warm. No rash noted. No erythema.   Psychiatric: He has a normal mood and affect.   Nursing  note and vitals reviewed.       Results Review:    I have reviewed all clinical data, test, lab, and imaging results.     Radiology  No Radiology Exams Resulted Within Past 24 Hours    Cardiology    Laboratory  Results from last 7 days   Lab Units 11/16/19  0514   WBC 10*3/mm3 8.30   HEMOGLOBIN g/dL 12.5*   HEMATOCRIT % 36.1*   PLATELETS 10*3/mm3 166     Results from last 7 days   Lab Units 11/16/19  0514   SODIUM mmol/L 141   POTASSIUM mmol/L 4.1   CHLORIDE mmol/L 106   CO2 mmol/L 25.0   BUN mg/dL 16   CREATININE mg/dL 0.98   GLUCOSE mg/dL 152*   CALCIUM mg/dL 9.1     Results from last 7 days   Lab Units 11/16/19  0514   SODIUM mmol/L 141   POTASSIUM mmol/L 4.1   CHLORIDE mmol/L 106   CO2 mmol/L 25.0   BUN mg/dL 16   CREATININE mg/dL 0.98   GLUCOSE mg/dL 152*   CALCIUM mg/dL 9.1     Results from last 7 days   Lab Units 11/14/19  2355   CK TOTAL U/L 124     Results from last 7 days   Lab Units 11/14/19  2355   SED RATE mm/hr 48*         Microbiology   Microbiology Results (last 10 days)     Procedure Component Value - Date/Time    Wound Culture - Wound, Toe, Right [[id number]]  (Abnormal) Collected:  11/15/19 1403    Lab Status:  Preliminary result Specimen:  Wound from Toe, Right Updated:  11/16/19 0935     Wound Culture Scant growth (1+) Staphylococcus aureus     Gram Stain No organisms seen    Blood Culture - Blood, Arm, Left [[id number]] Collected:  11/14/19 2354    Lab Status:  Preliminary result Specimen:  Blood from Arm, Left Updated:  11/16/19 0016     Blood Culture No growth at 24 hours    Blood Culture - Blood, Arm, Right [[id number]] Collected:  11/14/19 2354    Lab Status:  Preliminary result Specimen:  Blood from Arm, Right Updated:  11/16/19 0016     Blood Culture No growth at 24 hours    Wound Culture - Wound, Foot, Right [[id number]]  (Abnormal)  (Susceptibility) Collected:  11/14/19 1545    Lab Status:  Final result Specimen:  Wound from Foot, Right Updated:  11/16/19 0730     Wound Culture Moderate  growth (3+) Staphylococcus aureus     Gram Stain No WBCs seen      Occasional Gram positive cocci in clusters    Susceptibility      Staphylococcus aureus     ROBB     Clindamycin Susceptible     Erythromycin Susceptible     Inducible Clindamycin Resistance Negative     Oxacillin Susceptible     Penicillin G Resistant     Rifampin Susceptible     Tetracycline Susceptible     Trimethoprim + Sulfamethoxazole Susceptible     Vancomycin Susceptible                Susceptibility Comments     Staphylococcus aureus    This isolate does not demonstrate inducible clindamycin resistance in vitro.                     Medication Review:       Schedule Meds    atorvastatin 40 mg Oral Nightly   cefTRIAXone 2 g Intravenous Q24H   clindamycin 900 mg Intravenous Q8H   famotidine 20 mg Oral BID AC   insulin glargine 30 Units Subcutaneous BID With Meals   insulin lispro 0-7 Units Subcutaneous 4x Daily With Meals & Nightly   losartan 100 mg Oral Daily   metoprolol succinate XL 50 mg Oral Daily   pantoprazole 40 mg Oral Q AM   sodium chloride 10 mL Intravenous Q12H       Infusion Meds       PRN Meds  •  acetaminophen **OR** acetaminophen **OR** acetaminophen  •  bisacodyl  •  bisacodyl  •  dextrose  •  dextrose  •  glucagon (human recombinant)  •  insulin lispro **AND** insulin lispro  •  melatonin  •  ondansetron  •  oxyCODONE  •  oxyCODONE  •  sodium chloride        Assessment/Plan       Antimicrobial Therapy   1.  IV clindamycin      day  2.  IV vancomycin      day  3.      Day  4.      Day  5.      Day      Assessment     Right leg cellulitis.  Most likely caused by Staphylococcus aureus.  The patient has streak in the upper leg.  This is typical for toxin producing organism.     Right diabetic foot with the right big toe superficial ulceration.  This is probably the source of the cellulitis.  Wound culture is a growing methicillin susceptible Staphylococcus aureus.  MRI of the right foot showed no osteomyelitis or drainable  collection     Right Charcot foot with a chronic ulceration of the plantar aspect of the right foot     Diabetes mellitus with diabetic neuropathy     Plan     Discontinue IV vancomycin  Start Rocephin 2 g IV daily  Continue clindamycin 900 mg IV every 8 hours.  He was clindamycin for short course until streak resolved  Labs in a.m.  This was discussed with podiatry service  May de-escalate to p.o. antibiotics once symptoms improve  Try to keep right leg elevated as much as possible  No need for surgical intervention at this point  Probable home on Monday if patient continued to improve        Lisa Acosta MD  11/16/19  12:59 PM     Note is dictated utilizing voice recognition software/Dragon

## 2019-11-16 NOTE — PROGRESS NOTES
[facility] Medicine Services Daily Progress Note      Hospitalist Team  LOS 0 days      Patient Care Team:  Tete Blank MD as PCP - General        Chief Complaint / Subjective  Right foot great toe wound with redness advancing proximally in the right lower extremity to the medial distal thigh    11/16: Patient reports provement in the pain of his right lower extremity but he still has significant swelling.  He denies any fever, chills, sweats, GI or  complaints.    Brief Synopsis of Hospital Course/HPI  Patient is a 59-year-old gentleman with history of insulin-dependent diabetes mellitus with neuropathy and right Charcot foot who had been having an area on the dorsal aspect of his right great toe that was rubbing his boot and causing a callus.  Despite using bandages the wound subsequently broke open and then became erythematous and the surrounding tissues.  Over the last 2 days he is noted redness spreading up his right leg to the medial distal right thigh.  Infectious diseases saw the patient in consultation and he was treated with clindamycin and vancomycin initially cultures were obtained.    Review of systems   12 point review of systems was reviewed and was negative except as above.      History reviewed. No pertinent family history.    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   • Elevated cholesterol    • GERD (gastroesophageal reflux disease)    • Hypertension        Social History     Socioeconomic History   • Marital status:      Spouse name: Not on file   • Number of children: Not on file   • Years of education: Not on file   • Highest education level: Not on file   Tobacco Use   • Smoking status: Never Smoker   • Smokeless tobacco: Never Used   Substance and Sexual Activity   • Alcohol use: No     Frequency: Never   • Drug use: No   • Sexual activity: Defer  "          Objective      Vital Signs  Temp:  [97.4 °F (36.3 °C)-99.3 °F (37.4 °C)] 97.9 °F (36.6 °C)  Heart Rate:  [76-87] 84  Resp:  [16] 16  BP: (135-162)/(79-83) 160/83  Oxygen Therapy  SpO2: 95 %  Pulse Oximetry Type: Intermittent  Device (Oxygen Therapy): room air  Flowsheet Rows      First Filed Value   Admission Height  185.4 cm (73\") Documented at 11/14/2019 1848   Admission Weight  129 kg (285 lb 4.4 oz) Documented at 11/14/2019 1848        Intake & Output (last 3 days)       11/13 0701 - 11/14 0700 11/14 0701 - 11/15 0700 11/15 0701 - 11/16 0700 11/16 0701 - 11/17 0700    P.O.  240 720 960    Total Intake(mL/kg)  240 (1.9) 720 (5.5) 960 (7.4)    Net  +240 +720 +960            Urine Unmeasured Occurrence  2 x 4 x 6 x    Stool Unmeasured Occurrence    2 x        Lines, Drains & Airways    Active LDAs     Name:   Placement date:   Placement time:   Site:   Days:    Peripheral IV 11/14/19 2004 Anterior;Left Forearm   11/14/19 2004    Forearm   less than 1                  Physical Exam:   Well-developed over-nourished gentleman sitting up in bed comfortable on room air awake and alert no acute distress; lungs clear to auscultation bilaterally; CV regular rate and rhythm; abdomen soft and nontender; right lower extremity with diffuse swelling and erythema of the ankle and foot and medial distal thigh which has slightly improved; left lower extremity with no edema, no cyanosis or calf tendernrss; palpable pedal pulses bilaterally.    Procedures:              Results Review:     I reviewed the patient's new clinical results.    Results from last 7 days   Lab Units 11/16/19  0514 11/15/19  0430 11/14/19  2355   WBC 10*3/mm3 8.30 10.30 11.50*   HEMOGLOBIN g/dL 12.5* 13.1 13.2   HEMATOCRIT % 36.1* 38.6 39.0   PLATELETS 10*3/mm3 166 174 182     Results from last 7 days   Lab Units 11/16/19  0514 11/15/19  0430 11/14/19  2355   SODIUM mmol/L 141 140 138   POTASSIUM mmol/L 4.1 4.3 3.8   CHLORIDE mmol/L 106 101 99 "   CO2 mmol/L 25.0 27.0 27.0   BUN mg/dL 16 15 16   CREATININE mg/dL 0.98 1.12 1.20   CALCIUM mg/dL 9.1 9.4 9.5   BILIRUBIN mg/dL 0.5  --  0.6   ALK PHOS U/L 54  --  56   ALT (SGPT) U/L 31  --  36   AST (SGOT) U/L 21  --  22   GLUCOSE mg/dL 152* 169* 228*     Results from last 7 days   Lab Units 11/16/19  0514 11/15/19  0430   MAGNESIUM mg/dL 2.0 1.7     Lab Results   Component Value Date    CALCIUM 9.1 11/16/2019    PHOS 3.7 11/15/2019     Hemoglobin A1C   Date Value Ref Range Status   11/15/2019 6.7 (H) 3.5 - 5.6 % Final                   Microbiology Results (last 10 days)     Procedure Component Value - Date/Time    Wound Culture - Wound, Toe, Right [[id number]]  (Abnormal) Collected:  11/15/19 1403    Lab Status:  Preliminary result Specimen:  Wound from Toe, Right Updated:  11/16/19 0935     Wound Culture Scant growth (1+) Staphylococcus aureus     Gram Stain No organisms seen    Blood Culture - Blood, Arm, Left [[id number]] Collected:  11/14/19 2354    Lab Status:  Preliminary result Specimen:  Blood from Arm, Left Updated:  11/16/19 0016     Blood Culture No growth at 24 hours    Blood Culture - Blood, Arm, Right [[id number]] Collected:  11/14/19 2354    Lab Status:  Preliminary result Specimen:  Blood from Arm, Right Updated:  11/16/19 0016     Blood Culture No growth at 24 hours    Wound Culture - Wound, Foot, Right [[id number]]  (Abnormal)  (Susceptibility) Collected:  11/14/19 1541    Lab Status:  Final result Specimen:  Wound from Foot, Right Updated:  11/16/19 0730     Wound Culture Moderate growth (3+) Staphylococcus aureus     Gram Stain No WBCs seen      Occasional Gram positive cocci in clusters    Susceptibility      Staphylococcus aureus     ROBB     Clindamycin Susceptible     Erythromycin Susceptible     Inducible Clindamycin Resistance Negative     Oxacillin Susceptible     Penicillin G Resistant     Rifampin Susceptible     Tetracycline Susceptible     Trimethoprim + Sulfamethoxazole Susceptible      Vancomycin Susceptible                Susceptibility Comments     Staphylococcus aureus    This isolate does not demonstrate inducible clindamycin resistance in vitro.                     ECG/EMG Results (most recent)     None                    Xr Foot 3+ View Right    Result Date: 11/15/2019   1. Diffuse soft tissue swelling, without radiographic evidence of acute fracture or osteomyelitis. 2. Severe chronic changes in the midfoot, indicating Charcot arthropathy. 3. Moderate to advanced DJD the great toe MTP joint.  Electronically Signed By-David Patel On:11/15/2019 8:53 AM This report was finalized on [id number] by  David Patel, .    Mri Foot Right Without Contrast    Result Date: 11/15/2019   1. Soft tissue wound at the dorsal great toe with diffuse soft tissue edema that likely represents cellulitis. No loculated fluid collection to suggest abscess. 2. No MR evidence of osteomyelitis. 3. Partially imaged severe chronic changes in the midfoot, consistent with Charcot arthropathy. 4. Moderate DJD at the great toe MTP joint. 5. Severe diffuse muscular fatty atrophy, likely neuropathic.    Electronically Signed ByRhea Patel On:11/15/2019 1:00 PM This report was finalized on [id number] by  David Patel, .      Xrays, labs reviewed personally by physician.    Medication Review:   I have reviewed the patient's current medication list      Scheduled Meds    atorvastatin 40 mg Oral Nightly   cefTRIAXone 2 g Intravenous Q24H   clindamycin 900 mg Intravenous Q8H   famotidine 20 mg Oral BID AC   insulin glargine 30 Units Subcutaneous BID With Meals   insulin lispro 0-7 Units Subcutaneous 4x Daily With Meals & Nightly   losartan 100 mg Oral Daily   metoprolol succinate XL 50 mg Oral Daily   pantoprazole 40 mg Oral Q AM   sodium chloride 10 mL Intravenous Q12H       Meds Infusions       Meds PRN  •  acetaminophen **OR** acetaminophen **OR** acetaminophen  •  bisacodyl  •  bisacodyl  •  dextrose  •  dextrose  •   glucagon (human recombinant)  •  insulin lispro **AND** insulin lispro  •  melatonin  •  ondansetron  •  oxyCODONE  •  oxyCODONE  •  sodium chloride        Assessment / Plan    Active Hospital Problems    Diagnosis  POA   • **Infected diabetic foot ulcer (CMS/HCC) [E11.621, L97.509]  Yes     Priority: High   • Type II diabetes mellitus (CMS/HCC) [E11.9]  Yes     Priority: High   • Hyperlipidemia [E78.5]  Yes     Priority: Medium   • Charcot foot due to diabetes mellitus (CMS/HCC) [E11.610]  Yes     Priority: Medium   • Essential hypertension [I10]  Yes     Priority: Medium   • GERD (gastroesophageal reflux disease) [K21.9]  Yes     Priority: Low      Resolved Hospital Problems   No resolved problems to display.     Plan: Infectious diseases and podiatry are consulting.  No surgical intervention is planned at this time.  The patient has no evidence of osteomyelitis and will be treated with IV clindamycin in the hospital for couple of days and then will switch to oral once there are significant signs of improvement in the cellulitis.    DVT prophylaxis: Lovenox      Yesenia Deleon MD  11/16/19  6:50 PM

## 2019-11-16 NOTE — PROGRESS NOTES
LOS: 0 days   Patient Care Team:  Tete Blank MD as PCP - General    Chief Complaint:Great toe right foot turning red,     Subjective : Feels fine no new complaints    History of Present Illness: Sent over from wound care center for red hot swollen great toe       Objective : Dressing c/d/i right foot. Dressing down. Edema is down. Erythema still present but improved. Forefoot still angry but improved from yesterday.      Vital Signs  Temp:  [96.8 °F (36 °C)-99.3 °F (37.4 °C)] 98 °F (36.7 °C)  Heart Rate:  [77-87] 77  Resp:  [16-20] 16  BP: (135-155)/(75-82) 143/81      Results Review:    Wound Culture - Wound, Foot, Right [[id number]] (Order [id number])   Order   Date: 11/14/2019 Department: [facility] Ordering: Georgia Marquez Authorizing: Swapna Chopra MD   Reprint Order Requisition     WOUND CULTURE (Order #[id number]) on 11/14/19       Contains abnormal data Wound Culture - Wound, Foot, Right   Order: [id number]   Status:  Final result   Visible to patient:  No (Not Released)   Next appt:  None   Dx:  Non-pressure chronic ulcer of other p...   Specimen Information: Foot, Right; Wound        Wound Culture   Lab   Moderate growth (3+) Staphylococcus aureus Abnormal   [facility]             Gram Stain   Lab   No WBCs seen [facility]      Occasional Gram positive cocci in clusters [facility]            Susceptibility      Staphylococcus aureus     ROBB     Clindamycin 0.25 ug/ml Susceptible     Erythromycin <=0.25 ug/ml Susceptible     Inducible Clindamycin Resistance NEG ug/ml Negative     Oxacillin <=0.25 ug/ml Susceptible     Penicillin G >=0.5 ug/ml Resistant     Rifampin <=0.5 ug/ml Susceptible     Tetracycline <=1 ug/ml Susceptible     Trimethoprim + Sulfamethoxazole <=10 ug/ml Susceptible     Vancomycin <=0.5 ug/ml Susceptible           Susceptibility Comments     Staphylococcus aureus   This isolate does not demonstrate inducible clindamycin  resistance in vitro.          Specimen Collected: 11/14/19 15:45   Last Resulted: 11/16/19 07:30   Order Details View Encounter Lab and Collection Details Routing Result History           MRI Foot Right Without Contrast [[id number]] (Order [id number])   Order   Status: Final result   Patient Location     Patient Class Location   Observation [facility] OBSERVATION, 110, 1     [phone]      Study Notes      Sully Buck on 11/15/2019 12:02 PM   ? Osteo in Rt great toe  Pt c/o pain  Osteomyelitis suspected, foot swelling, diabetic   Comments: Charcot foot but concern is in great toe with new ulceration       Appointment Information     PACS Images      Radiology Images   Study Result     MRI FOOT RIGHT WO CONTRAST-     Date of Exam: 11/15/2019 11:53 AM     Indication: Osteomyelitis suspected, foot swelling, diabetic.  Great toe  wound.     Comparison: Radiographs 11/15/2019.     Technique: Multiplanar multisequence images of the foot were performed  according to routine foot MRI protocol.     FINDINGS:  SOFT TISSUES  Soft tissue wound at the dorsum of the great toe at the level of the IP  joint. Diffuse soft tissue edema, greatest at the dorsum of the forefoot  and the great toe. No likely fluid collection to suggest abscess.  Mild  edema in the sinus tarsi. The partially imaged planar fascia is  unremarkable. Intermetatarsal spaces are unremarkable.     BONES  No acute fracture. No T1 marrow replacement definitive for  osteomyelitis.  Chronic deformities in the tarsal bones and bases of the  metatarsals with subcortical cystic changes, consistent with chronic  Charcot arthropathy. No concerning osseous lesion.     JOINTS  Partially imaged severe arthropathic changes in the midfoot with  destructive changes, disorientation, dislocation, consistent with  chronic Charcot arthropathy. Moderate DJD at the great toe MTP joint. No  significant joint effusion.  No discrete intra-articular body.     TENDONS  The flexor and  extensor tendons are intact.  The plantar plates are  grossly intact.     MUSCLES  Severe diffuse muscular fatty atrophy, likely neuropathic. No myositis.     LIGAMENTS  Likely chronic disruption of the Lisfranc ligament complex.  The  metatarsal collateral ligaments are intact.     IMPRESSION:     1. Soft tissue wound at the dorsal great toe with diffuse soft tissue  edema that likely represents cellulitis. No loculated fluid collection  to suggest abscess.  2. No MR evidence of osteomyelitis.  3. Partially imaged severe chronic changes in the midfoot, consistent  with Charcot arthropathy.  4. Moderate DJD at the great toe MTP joint.  5. Severe diffuse muscular fatty atrophy, likely neuropathic.           Electronically Signed By-David Patel On:11/15/2019 1:00 PM  This report was finalized on [id number] by  David Patel, .     Magnesium [CLA146] (Order [id number])   Order   Date: 11/16/2019 Department: [facility] OBSERVATION Released By: Bety Daigle RN (auto-released) Authorizing: Yesenia Deleon MD   Reprint Order Requisition     Magnesium (Order #[id number]) on 11/16/19       Magnesium   Order: [id number]   Status:  Final result   Visible to patient:  No (Not Released)   Next appt:  None   Component  Ref Range & Units 05:14   Magnesium  1.6 - 2.6 mg/dL 2.0    Resulting Agency [facility]         Specimen Collected: 11/16/19 05:14   Last Resulted: 11/16/19 06:55   Lab Flowsheet Order Details View Encounter Lab and Collection Details Routing Result History           Comprehensive Metabolic Panel [LAB17] (Order [id number])   Order   Date: 11/16/2019 Department: [facility] OBSERVATION Released By/Authorizing: Lisa Acosta MD (auto-released)   Reprint Order Requisition     Comprehensive Metabolic Panel (Order #[id number]) on 11/16/19       Contains abnormal data Comprehensive Metabolic Panel   Order: [id number]   Status:  Final result   Visible to patient:  No (Not Released)   Next appt:  None    Component  Ref Range & Units 05:14   Glucose  65 - 99 mg/dL 152 Abnormally high     BUN  6 - 20 mg/dL 16    Creatinine  0.76 - 1.27 mg/dL 0.98    Sodium  136 - 145 mmol/L 141    Potassium  3.5 - 5.2 mmol/L 4.1    Chloride  98 - 107 mmol/L 106    CO2  22.0 - 29.0 mmol/L 25.0    Calcium  8.6 - 10.5 mg/dL 9.1    Total Protein  6.0 - 8.5 g/dL 6.7    Albumin  3.50 - 5.20 g/dL 3.60    ALT (SGPT)  1 - 41 U/L 31    AST (SGOT)  1 - 40 U/L 21    Alkaline Phosphatase  39 - 117 U/L 54    Total Bilirubin  0.2 - 1.2 mg/dL 0.5    eGFR Non African Amer  >60 mL/min/1.73 79    Globulin  gm/dL 3.1    A/G Ratio  g/dL 1.2    BUN/Creatinine Ratio  7.0 - 25.0 16.3    Anion Gap  5.0 - 15.0 mmol/L 10.0    Resulting Agency [facility]      Narrative   Performed by:  [facility]   GFR Normal >60  Chronic Kidney Disease <60  Kidney Failure <15      Specimen Collected: 11/16/19 05:14   Last Resulted: 11/16/19 06:55   Lab Flowsheet Order Details View Encounter Lab and Collection Details Routing Result History         Related Result Highlights         Vancomycin, Peak  Final result 11/16/2019             Magnesium  Final result 11/16/2019                  Related Result Highlights         Vancomycin, Peak  Final result 11/16/2019             Magnesium  Final result 11/16/2019            Other Results from 11/14/2019      POC Glucose Once  Final result 11/16/2019    CBC Auto Differential  Final result 11/16/2019    POC Glucose Once  Final result 11/15/2019    POC Glucose Once  Final result 11/15/2019    Wound Culture - Wound, Toe, Right  Preliminary result 11/15/2019    POC Glucose Once  Final result 11/15/2019    POC Glucose Once  Final result 11/15/2019    Basic Metabolic Panel  Final result 11/15/2019    CBC Auto Differential  Final result 11/15/2019    Phosphorus  Final result 11/15/2019    Magnesium  Final result 11/15/2019    Hemoglobin A1c  Final result 11/15/2019    Lipid Panel  Final result 11/15/2019    Comprehensive Metabolic Panel   Final result 11/14/2019    C-reactive Protein  Final result 11/14/2019    Sedimentation Rate  Final result 11/14/2019    Procalcitonin  Final result 11/14/2019    CK  Final result 11/14/2019    Lactic Acid, Plasma  Final result 11/14/2019    CBC Auto Differential  Final result      Results   CBC & Differential (Order [id number])   Linked Results     Procedure Abnormality Status   CBC Auto Differential Abnormal Final result   Contains abnormal data CBC & Differential   Order: [id number]   Status:  Final result   Visible to patient:  No (Not Released)         Specimen Collected: 11/16/19 05:14   Last Resulted: 11/16/19 06:04      · Order Details    · View Encounter    · Lab and Collection Details    · Routing    · Result History              Contains abnormal data CBC Auto Differential   Order: [id number] - Part of Panel Order [id number]   Status:  Final result   Visible to patient:  No (Not Released)   Component  Ref Range & Units 05:14   WBC  3.40 - 10.80 10*3/mm3 8.30    RBC  4.14 - 5.80 10*6/mm3 4.02 Abnormally low     Hemoglobin  13.0 - 17.7 g/dL 12.5 Abnormally low     Hematocrit  37.5 - 51.0 % 36.1 Abnormally low     MCV  79.0 - 97.0 fL 89.9    MCH  26.6 - 33.0 pg 31.2    MCHC  31.5 - 35.7 g/dL 34.7    RDW  12.3 - 15.4 % 13.9    RDW-SD  37.0 - 54.0 fl 44.2    MPV  6.0 - 12.0 fL 8.9    Platelets  140 - 450 10*3/mm3 166    Neutrophil %  42.7 - 76.0 % 65.0    Lymphocyte %  19.6 - 45.3 % 18.7 Abnormally low     Monocyte %  5.0 - 12.0 % 11.1    Eosinophil %  0.3 - 6.2 % 4.2    Basophil %  0.0 - 1.5 % 1.0    Neutrophils, Absolute  1.70 - 7.00 10*3/mm3 5.40    Lymphocytes, Absolute  0.70 - 3.10 10*3/mm3 1.60    Monocytes, Absolute  0.10 - 0.90 10*3/mm3 0.90    Eosinophils, Absolute  0.00 - 0.40 10*3/mm3 0.30    Basophils, Absolute  0.00 - 0.20 10*3/mm3 0.10    nRBC  0.0 - 0.2 /100 WBC 0.0    Resulting Agency [facility]         Specimen Collected: 11/16/19 05:14   Last Resulted: 11/16/19 06:04      ·  Lab Flowsheet     · Order Details    · View Encounter    · Lab and Collection Details    · Routing    · Result History                 CBC & Differential (Order [id number])   Linked Results     Procedure Abnormality Status   CBC Auto Differential Abnormal Final result      CBC Auto Differential (Order [id number])   Additional Information     Specimen ID Bill Type Client ID   [id number]            Specimen Date Taken Specimen Time Taken Specimen Received Date Specimen Received Time Result Date Result Time   Nov 16, 2019  5:14 AM   Nov 16, 2019  6:04 AM   Results Routing Tracking     View Results Routing Information         Assessment/Plan       Infected diabetic foot ulcer (CMS/HCC)    Hyperlipidemia    Charcot foot due to diabetes mellitus (CMS[facility])    Type II diabetes mellitus (CMS/[facility])    GERD (gastroesophageal reflux disease)    Essential hypertension      Assessment & Plan  At this point no plans for any surgical intervention. Pt will need good wound care and at this time he is doing betadine gauze wet to dry bid. Weight bearing ok in camwalker. Abx per infectious disease. Will sign off at this time but any changes please reconsult. Pt will fu in [facility] upon discharge.    Santy Selby DPM  11/16/19  8:49 AM

## 2019-11-16 NOTE — PLAN OF CARE
Problem: Patient Care Overview  Goal: Plan of Care Review  Outcome: Ongoing (interventions implemented as appropriate)   11/16/19 0316   Coping/Psychosocial   Plan of Care Reviewed With patient;spouse   Plan of Care Review   Progress improving       Problem: Pain, Acute (Adult)  Goal: Identify Related Risk Factors and Signs and Symptoms  Outcome: Ongoing (interventions implemented as appropriate)   11/16/19 0316   Pain, Acute (Adult)   Related Risk Factors (Acute Pain) infection;disease process   Signs and Symptoms (Acute Pain) verbalization of pain descriptors     Goal: Acceptable Pain Control/Comfort Level  Outcome: Ongoing (interventions implemented as appropriate)   11/16/19 0316   Pain, Acute (Adult)   Acceptable Pain Control/Comfort Level making progress toward outcome

## 2019-11-17 LAB
ALBUMIN SERPL-MCNC: 3.5 G/DL (ref 3.5–5.2)
ALBUMIN/GLOB SERPL: 1 G/DL
ALP SERPL-CCNC: 56 U/L (ref 39–117)
ALT SERPL W P-5'-P-CCNC: 37 U/L (ref 1–41)
ANION GAP SERPL CALCULATED.3IONS-SCNC: 13 MMOL/L (ref 5–15)
AST SERPL-CCNC: 25 U/L (ref 1–40)
BACTERIA SPEC AEROBE CULT: ABNORMAL
BASOPHILS # BLD AUTO: 0 10*3/MM3 (ref 0–0.2)
BASOPHILS NFR BLD AUTO: 0.3 % (ref 0–1.5)
BILIRUB SERPL-MCNC: 0.6 MG/DL (ref 0.2–1.2)
BUN BLD-MCNC: 14 MG/DL (ref 6–20)
BUN/CREAT SERPL: 14 (ref 7–25)
CALCIUM SPEC-SCNC: 9.1 MG/DL (ref 8.6–10.5)
CHLORIDE SERPL-SCNC: 106 MMOL/L (ref 98–107)
CO2 SERPL-SCNC: 22 MMOL/L (ref 22–29)
CREAT BLD-MCNC: 1 MG/DL (ref 0.76–1.27)
DEPRECATED RDW RBC AUTO: 43.8 FL (ref 37–54)
EOSINOPHIL # BLD AUTO: 0.5 10*3/MM3 (ref 0–0.4)
EOSINOPHIL NFR BLD AUTO: 5.5 % (ref 0.3–6.2)
ERYTHROCYTE [DISTWIDTH] IN BLOOD BY AUTOMATED COUNT: 13.8 % (ref 12.3–15.4)
GFR SERPL CREATININE-BSD FRML MDRD: 77 ML/MIN/1.73
GLOBULIN UR ELPH-MCNC: 3.6 GM/DL
GLUCOSE BLD-MCNC: 125 MG/DL (ref 65–99)
GLUCOSE BLDC GLUCOMTR-MCNC: 114 MG/DL (ref 70–105)
GLUCOSE BLDC GLUCOMTR-MCNC: 241 MG/DL (ref 70–105)
GLUCOSE BLDC GLUCOMTR-MCNC: 255 MG/DL (ref 70–105)
GLUCOSE BLDC GLUCOMTR-MCNC: 302 MG/DL (ref 70–105)
GRAM STN SPEC: ABNORMAL
HCT VFR BLD AUTO: 38.3 % (ref 37.5–51)
HGB BLD-MCNC: 13 G/DL (ref 13–17.7)
LYMPHOCYTES # BLD AUTO: 2 10*3/MM3 (ref 0.7–3.1)
LYMPHOCYTES NFR BLD AUTO: 23.2 % (ref 19.6–45.3)
MCH RBC QN AUTO: 30.5 PG (ref 26.6–33)
MCHC RBC AUTO-ENTMCNC: 33.9 G/DL (ref 31.5–35.7)
MCV RBC AUTO: 90.1 FL (ref 79–97)
MONOCYTES # BLD AUTO: 1 10*3/MM3 (ref 0.1–0.9)
MONOCYTES NFR BLD AUTO: 11.1 % (ref 5–12)
NEUTROPHILS # BLD AUTO: 5.3 10*3/MM3 (ref 1.7–7)
NEUTROPHILS NFR BLD AUTO: 59.9 % (ref 42.7–76)
NRBC BLD AUTO-RTO: 0 /100 WBC (ref 0–0.2)
PLATELET # BLD AUTO: 200 10*3/MM3 (ref 140–450)
PMV BLD AUTO: 9.8 FL (ref 6–12)
POTASSIUM BLD-SCNC: 4.3 MMOL/L (ref 3.5–5.2)
PROT SERPL-MCNC: 7.1 G/DL (ref 6–8.5)
RBC # BLD AUTO: 4.25 10*6/MM3 (ref 4.14–5.8)
SODIUM BLD-SCNC: 141 MMOL/L (ref 136–145)
WBC NRBC COR # BLD: 8.8 10*3/MM3 (ref 3.4–10.8)

## 2019-11-17 PROCEDURE — 80053 COMPREHEN METABOLIC PANEL: CPT | Performed by: INTERNAL MEDICINE

## 2019-11-17 PROCEDURE — 63710000001 INSULIN LISPRO (HUMAN) PER 5 UNITS: Performed by: HOSPITALIST

## 2019-11-17 PROCEDURE — G0378 HOSPITAL OBSERVATION PER HR: HCPCS

## 2019-11-17 PROCEDURE — 25010000002 ENOXAPARIN PER 10 MG: Performed by: HOSPITALIST

## 2019-11-17 PROCEDURE — 63710000001 INSULIN GLARGINE PER 5 UNITS: Performed by: HOSPITALIST

## 2019-11-17 PROCEDURE — 82962 GLUCOSE BLOOD TEST: CPT

## 2019-11-17 PROCEDURE — 99232 SBSQ HOSP IP/OBS MODERATE 35: CPT | Performed by: HOSPITALIST

## 2019-11-17 PROCEDURE — 85025 COMPLETE CBC W/AUTO DIFF WBC: CPT | Performed by: INTERNAL MEDICINE

## 2019-11-17 PROCEDURE — 25010000002 CEFTRIAXONE PER 250 MG: Performed by: INTERNAL MEDICINE

## 2019-11-17 RX ADMIN — Medication 10 ML: at 08:11

## 2019-11-17 RX ADMIN — NAFCILLIN 12 G: 10 INJECTION, POWDER, FOR SOLUTION INTRAVENOUS at 17:44

## 2019-11-17 RX ADMIN — LOSARTAN POTASSIUM 100 MG: 50 TABLET, FILM COATED ORAL at 08:11

## 2019-11-17 RX ADMIN — INSULIN LISPRO 4 UNITS: 100 INJECTION, SOLUTION INTRAVENOUS; SUBCUTANEOUS at 12:07

## 2019-11-17 RX ADMIN — CEFTRIAXONE SODIUM 2 G: 2 INJECTION, POWDER, FOR SOLUTION INTRAMUSCULAR; INTRAVENOUS at 10:08

## 2019-11-17 RX ADMIN — PANTOPRAZOLE SODIUM 40 MG: 40 TABLET, DELAYED RELEASE ORAL at 06:41

## 2019-11-17 RX ADMIN — INSULIN LISPRO 3 UNITS: 100 INJECTION, SOLUTION INTRAVENOUS; SUBCUTANEOUS at 17:46

## 2019-11-17 RX ADMIN — INSULIN GLARGINE 30 UNITS: 100 INJECTION, SOLUTION SUBCUTANEOUS at 08:11

## 2019-11-17 RX ADMIN — FAMOTIDINE 20 MG: 20 TABLET ORAL at 06:41

## 2019-11-17 RX ADMIN — CLINDAMYCIN PHOSPHATE 900 MG: 900 INJECTION, SOLUTION INTRAVENOUS at 10:28

## 2019-11-17 RX ADMIN — ATORVASTATIN CALCIUM 40 MG: 40 TABLET, FILM COATED ORAL at 21:38

## 2019-11-17 RX ADMIN — ENOXAPARIN SODIUM 40 MG: 40 INJECTION SUBCUTANEOUS at 16:42

## 2019-11-17 RX ADMIN — FAMOTIDINE 20 MG: 20 TABLET ORAL at 16:44

## 2019-11-17 RX ADMIN — INSULIN GLARGINE 30 UNITS: 100 INJECTION, SOLUTION SUBCUTANEOUS at 17:46

## 2019-11-17 RX ADMIN — METOPROLOL SUCCINATE 50 MG: 50 TABLET, EXTENDED RELEASE ORAL at 08:11

## 2019-11-17 RX ADMIN — CLINDAMYCIN PHOSPHATE 900 MG: 900 INJECTION, SOLUTION INTRAVENOUS at 02:55

## 2019-11-17 RX ADMIN — INSULIN LISPRO 5 UNITS: 100 INJECTION, SOLUTION INTRAVENOUS; SUBCUTANEOUS at 21:39

## 2019-11-17 NOTE — PLAN OF CARE
Problem: Patient Care Overview  Goal: Plan of Care Review  Outcome: Ongoing (interventions implemented as appropriate)   11/17/19 0212   Coping/Psychosocial   Plan of Care Reviewed With patient   Plan of Care Review   Progress improving

## 2019-11-17 NOTE — PROGRESS NOTES
[facility] Medicine Services Daily Progress Note      Hospitalist Team  LOS 0 days      Patient Care Team:  Tete Blank MD as PCP - General        Chief Complaint / Subjective  Right foot great toe wound with redness advancing proximally in the right lower extremity to the medial distal thigh    11/16: Patient reports improvement in the pain of his right lower extremity but he still has significant swelling.  He denies any fever, chills, sweats, GI or  complaints.  11/17: Patient reports continued improvement in the swelling in his right lower extremity and redness.    Brief Synopsis of Hospital Course/HPI  Patient is a 59-year-old gentleman with history of insulin-dependent diabetes mellitus with neuropathy and right Charcot foot who had been having an area on the dorsal aspect of his right great toe that was rubbing his boot and causing a callus.  Despite using bandages the wound subsequently broke open and then became erythematous and the surrounding tissues.  Over the last 2 days he is noted redness spreading up his right leg to the medial distal right thigh.  Infectious diseases saw the patient in consultation and he was treated with clindamycin and vancomycin initially cultures were obtained.    Review of systems   12 point review of systems was reviewed and was negative except as above.      History reviewed. No pertinent family history.    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   • Elevated cholesterol    • GERD (gastroesophageal reflux disease)    • Hypertension        Social History     Socioeconomic History   • Marital status:      Spouse name: Not on file   • Number of children: Not on file   • Years of education: Not on file   • Highest education level: Not on file   Tobacco Use   • Smoking status: Never Smoker   • Smokeless tobacco: Never Used   Substance and Sexual Activity   •  "Alcohol use: No     Frequency: Never   • Drug use: No   • Sexual activity: Defer           Objective      Vital Signs  Temp:  [98.1 °F (36.7 °C)-99 °F (37.2 °C)] 98.3 °F (36.8 °C)  Heart Rate:  [74-78] 76  Resp:  [16-18] 16  BP: (148-164)/(65-90) 148/65  Oxygen Therapy  SpO2: 96 %  Pulse Oximetry Type: Intermittent  Device (Oxygen Therapy): room air(Simultaneous filing. User may be unaware of other data.)  Flowsheet Rows      First Filed Value   Admission Height  185.4 cm (73\") Documented at 11/14/2019 1848   Admission Weight  129 kg (285 lb 4.4 oz) Documented at 11/14/2019 1848        Intake & Output (last 3 days)       11/15 0701 - 11/16 0700 11/16 0701 - 11/17 0700 11/17 0701 - 11/18 0700    P.O. 720 2280 1320    Total Intake(mL/kg) 720 (5.5) 2280 (18) 1320 (10.4)    Net +720 +2280 +1320           Urine Unmeasured Occurrence 4 x 7 x 4 x    Stool Unmeasured Occurrence  2 x         Lines, Drains & Airways    Active LDAs     Name:   Placement date:   Placement time:   Site:   Days:    Peripheral IV 11/14/19 2004 Anterior;Left Forearm   11/14/19 2004    Forearm   less than 1                  Physical Exam:   Well-developed over-nourished male in no acute distress sitting up in bed awake and alert on room air; mucous membranes moist; sclerae anicteric; lungs clear to auscultation bilaterally; CV regular rate and rhythm; abdomen soft nontender nondistended with active bowel sounds; RLE with charcot deformity with open wound plantar surface midfoot and swelling with erythema of great toe and medial ball of foot with open wound anteriorly;  LLE with no edema, cyanosis or calf tenderness; palpable pedal pulses bilaterally; neurologic exam grossly nonfocal; no Jones catheter.    Procedures:              Results Review:     I reviewed the patient's new clinical results.    Results from last 7 days   Lab Units 11/17/19  0421 11/16/19  0514 11/15/19  0430   WBC 10*3/mm3 8.80 8.30 10.30   HEMOGLOBIN g/dL 13.0 12.5* 13.1 "   HEMATOCRIT % 38.3 36.1* 38.6   PLATELETS 10*3/mm3 200 166 174     Results from last 7 days   Lab Units 11/17/19  0421 11/16/19  0514 11/15/19  0430 11/14/19  2355   SODIUM mmol/L 141 141 140 138   POTASSIUM mmol/L 4.3 4.1 4.3 3.8   CHLORIDE mmol/L 106 106 101 99   CO2 mmol/L 22.0 25.0 27.0 27.0   BUN mg/dL 14 16 15 16   CREATININE mg/dL 1.00 0.98 1.12 1.20   CALCIUM mg/dL 9.1 9.1 9.4 9.5   BILIRUBIN mg/dL 0.6 0.5  --  0.6   ALK PHOS U/L 56 54  --  56   ALT (SGPT) U/L 37 31  --  36   AST (SGOT) U/L 25 21  --  22   GLUCOSE mg/dL 125* 152* 169* 228*     Results from last 7 days   Lab Units 11/16/19  0514 11/15/19  0430   MAGNESIUM mg/dL 2.0 1.7     Lab Results   Component Value Date    CALCIUM 9.1 11/17/2019    PHOS 3.7 11/15/2019     Hemoglobin A1C   Date Value Ref Range Status   11/15/2019 6.7 (H) 3.5 - 5.6 % Final                   Microbiology Results (last 10 days)     Procedure Component Value - Date/Time    Wound Culture - Wound, Toe, Right [[id number]]  (Abnormal)  (Susceptibility) Collected:  11/15/19 1403    Lab Status:  Final result Specimen:  Wound from Toe, Right Updated:  11/17/19 0714     Wound Culture Scant growth (1+) Staphylococcus aureus     Gram Stain No organisms seen    Susceptibility      Staphylococcus aureus     ROBB     Clindamycin Susceptible     Erythromycin Susceptible     Inducible Clindamycin Resistance Negative     Oxacillin Susceptible     Penicillin G Resistant     Rifampin Susceptible     Tetracycline Susceptible     Trimethoprim + Sulfamethoxazole Susceptible     Vancomycin Susceptible                Susceptibility Comments     Staphylococcus aureus    This isolate does not demonstrate inducible clindamycin resistance in vitro.               Blood Culture - Blood, Arm, Left [[id number]] Collected:  11/14/19 5616    Lab Status:  Preliminary result Specimen:  Blood from Arm, Left Updated:  11/18/19 0016     Blood Culture No growth at 3 days    Blood Culture - Blood, Arm, Right  [[id number]] Collected:  11/14/19 8114    Lab Status:  Preliminary result Specimen:  Blood from Arm, Right Updated:  11/18/19 0016     Blood Culture No growth at 3 days    Wound Culture - Wound, Foot, Right [[id number]]  (Abnormal)  (Susceptibility) Collected:  11/14/19 1545    Lab Status:  Final result Specimen:  Wound from Foot, Right Updated:  11/16/19 0730     Wound Culture Moderate growth (3+) Staphylococcus aureus     Gram Stain No WBCs seen      Occasional Gram positive cocci in clusters    Susceptibility      Staphylococcus aureus     ROBB     Clindamycin Susceptible     Erythromycin Susceptible     Inducible Clindamycin Resistance Negative     Oxacillin Susceptible     Penicillin G Resistant     Rifampin Susceptible     Tetracycline Susceptible     Trimethoprim + Sulfamethoxazole Susceptible     Vancomycin Susceptible                Susceptibility Comments     Staphylococcus aureus    This isolate does not demonstrate inducible clindamycin resistance in vitro.                     ECG/EMG Results (most recent)     None                    Xr Foot 3+ View Right    Result Date: 11/15/2019   1. Diffuse soft tissue swelling, without radiographic evidence of acute fracture or osteomyelitis. 2. Severe chronic changes in the midfoot, indicating Charcot arthropathy. 3. Moderate to advanced DJD the great toe MTP joint.  Electronically Signed By-David Patel On:11/15/2019 8:53 AM This report was finalized on [id number] by  David Patel, .    Mri Foot Right Without Contrast    Result Date: 11/15/2019   1. Soft tissue wound at the dorsal great toe with diffuse soft tissue edema that likely represents cellulitis. No loculated fluid collection to suggest abscess. 2. No MR evidence of osteomyelitis. 3. Partially imaged severe chronic changes in the midfoot, consistent with Charcot arthropathy. 4. Moderate DJD at the great toe MTP joint. 5. Severe diffuse muscular fatty atrophy, likely neuropathic.    Electronically Signed  By-David Patel On:11/15/2019 1:00 PM This report was finalized on [id number] by  David Patel, .      Xrays, labs reviewed personally by physician.    Medication Review:   I have reviewed the patient's current medication list      Scheduled Meds    atorvastatin 40 mg Oral Nightly   enoxaparin 40 mg Subcutaneous Q24H   famotidine 20 mg Oral BID AC   insulin glargine 30 Units Subcutaneous BID With Meals   insulin lispro 0-7 Units Subcutaneous 4x Daily With Meals & Nightly   losartan 100 mg Oral Daily   metoprolol succinate XL 50 mg Oral Daily   pantoprazole 40 mg Oral Q AM   sodium chloride 10 mL Intravenous Q12H       Meds Infusions    nafcillin (UNIPEN) 12 g in  mL continuous infusion 12 g Last Rate: 12 g (11/17/19 0615)       Meds PRN  •  acetaminophen **OR** acetaminophen **OR** acetaminophen  •  bisacodyl  •  bisacodyl  •  dextrose  •  dextrose  •  glucagon (human recombinant)  •  insulin lispro **AND** insulin lispro  •  melatonin  •  ondansetron  •  oxyCODONE  •  oxyCODONE  •  sodium chloride        Assessment / Plan    Active Hospital Problems    Diagnosis  POA   • **Infected diabetic foot ulcer (CMS/HCC) [E11.621, L97.509]  Yes     Priority: High   • Type II diabetes mellitus (CMS/HCC) [E11.9]  Yes     Priority: High   • Hyperlipidemia [E78.5]  Yes     Priority: Medium   • Charcot foot due to diabetes mellitus (CMS/HCC) [E11.610]  Yes     Priority: Medium   • Essential hypertension [I10]  Yes     Priority: Medium   • GERD (gastroesophageal reflux disease) [K21.9]  Yes     Priority: Low   • Obesity (BMI 30-39.9) [E66.9]  Yes      Resolved Hospital Problems   No resolved problems to display.     Plan: Infectious diseases and podiatry are consulting.  No surgical intervention is planned at this time.  The patient has no evidence of osteomyelitis and will be treated with IV clindamycin in the hospital for couple of days and then will switch to oral once there are significant signs of improvement in the  cellulitis.    DVT prophylaxis: Lovenox      Yesenia Deleon MD  11/18/19  12:20 AM

## 2019-11-17 NOTE — PLAN OF CARE
Problem: Patient Care Overview  Goal: Plan of Care Review  Outcome: Ongoing (interventions implemented as appropriate)   11/17/19 5366   Coping/Psychosocial   Plan of Care Reviewed With patient   Plan of Care Review   Progress improving   OTHER   Outcome Summary wound to right foot seems to be getting better     Goal: Individualization and Mutuality  Outcome: Ongoing (interventions implemented as appropriate)    Goal: Discharge Needs Assessment  Outcome: Ongoing (interventions implemented as appropriate)    Goal: Interprofessional Rounds/Family Conf  Outcome: Ongoing (interventions implemented as appropriate)

## 2019-11-17 NOTE — PLAN OF CARE
Problem: Pain, Acute (Adult)  Goal: Identify Related Risk Factors and Signs and Symptoms  Outcome: Outcome(s) achieved Date Met: 11/17/19 11/17/19 0211   Pain, Acute (Adult)   Related Risk Factors (Acute Pain) infection;disease process   Signs and Symptoms (Acute Pain) verbalization of pain descriptors     Goal: Acceptable Pain Control/Comfort Level  Outcome: Outcome(s) achieved Date Met: 11/17/19  Denies any pain;    11/17/19 0211   Pain, Acute (Adult)   Acceptable Pain Control/Comfort Level achieves outcome

## 2019-11-17 NOTE — PROGRESS NOTES
Infectious Diseases Progress Note      LOS: 0 days   Patient Care Team:  Tete Blank MD as PCP - General    Chief Complaint: Right leg pain    Subjective     The patient had no fever during the last 24 hours.  The patient is hemodynamically stable.  The patient is tolerating his current antimicrobial therapy with no side effects.  The patient has improvement in the right upper leg pain.    Review of Systems:   Review of Systems   Constitutional: Negative.    HENT: Negative.    Eyes: Negative.    Respiratory: Negative.    Cardiovascular: Negative.    Gastrointestinal: Negative.    Genitourinary: Negative.    Musculoskeletal: Negative.    Skin: Positive for wound.   Neurological: Negative.    Hematological: Negative.    Psychiatric/Behavioral: Negative.         Objective     Vital Signs  Temp:  [98.1 °F (36.7 °C)-98.7 °F (37.1 °C)] 98.1 °F (36.7 °C)  Heart Rate:  [74-84] 74  Resp:  [16] 16  BP: (134-164)/(75-77) 164/76    Physical Exam:  Physical Exam   Constitutional: He is oriented to person, place, and time. He appears well-developed and well-nourished.   HENT:   Head: Normocephalic and atraumatic.   Eyes: EOM are normal. Pupils are equal, round, and reactive to light.   Neck: Normal range of motion. Neck supple.   Cardiovascular: Normal rate, regular rhythm and normal heart sounds.   Pulmonary/Chest: Effort normal and breath sounds normal. No respiratory distress. He has no wheezes. He has no rales.   Abdominal: Soft. Bowel sounds are normal. He exhibits no distension and no mass. There is no tenderness. There is no rebound and no guarding.   Musculoskeletal: Normal range of motion. He exhibits no edema or deformity.   Improvement of the right upper leg streak.  Persistent right lower leg erythema.   Neurological: He is alert and oriented to person, place, and time. No cranial nerve deficit.   Skin: Skin is warm. No rash noted. No erythema.   Petechial rash on the right lower leg with mild reticulation  in the left ankle   Psychiatric: He has a normal mood and affect.   Nursing note and vitals reviewed.       Results Review:    I have reviewed all clinical data, test, lab, and imaging results.     Radiology  No Radiology Exams Resulted Within Past 24 Hours    Cardiology    Laboratory  Results from last 7 days   Lab Units 11/17/19  0421   WBC 10*3/mm3 8.80   HEMOGLOBIN g/dL 13.0   HEMATOCRIT % 38.3   PLATELETS 10*3/mm3 200     Results from last 7 days   Lab Units 11/17/19  0421   SODIUM mmol/L 141   POTASSIUM mmol/L 4.3   CHLORIDE mmol/L 106   CO2 mmol/L 22.0   BUN mg/dL 14   CREATININE mg/dL 1.00   GLUCOSE mg/dL 125*   CALCIUM mg/dL 9.1     Results from last 7 days   Lab Units 11/17/19  0421   SODIUM mmol/L 141   POTASSIUM mmol/L 4.3   CHLORIDE mmol/L 106   CO2 mmol/L 22.0   BUN mg/dL 14   CREATININE mg/dL 1.00   GLUCOSE mg/dL 125*   CALCIUM mg/dL 9.1     Results from last 7 days   Lab Units 11/14/19  2355   CK TOTAL U/L 124     Results from last 7 days   Lab Units 11/14/19  2355   SED RATE mm/hr 48*         Microbiology   Microbiology Results (last 10 days)     Procedure Component Value - Date/Time    Wound Culture - Wound, Toe, Right [[id number]]  (Abnormal)  (Susceptibility) Collected:  11/15/19 1403    Lab Status:  Final result Specimen:  Wound from Toe, Right Updated:  11/17/19 0714     Wound Culture Scant growth (1+) Staphylococcus aureus     Gram Stain No organisms seen    Susceptibility      Staphylococcus aureus     ROBB     Clindamycin Susceptible     Erythromycin Susceptible     Inducible Clindamycin Resistance Negative     Oxacillin Susceptible     Penicillin G Resistant     Rifampin Susceptible     Tetracycline Susceptible     Trimethoprim + Sulfamethoxazole Susceptible     Vancomycin Susceptible                Susceptibility Comments     Staphylococcus aureus    This isolate does not demonstrate inducible clindamycin resistance in vitro.               Blood Culture - Blood, Arm, Left [[id number]]  Collected:  11/14/19 2354    Lab Status:  Preliminary result Specimen:  Blood from Arm, Left Updated:  11/17/19 0004     Blood Culture No growth at 2 days    Blood Culture - Blood, Arm, Right [[id number]] Collected:  11/14/19 2354    Lab Status:  Preliminary result Specimen:  Blood from Arm, Right Updated:  11/17/19 0004     Blood Culture No growth at 2 days    Wound Culture - Wound, Foot, Right [[id number]]  (Abnormal)  (Susceptibility) Collected:  11/14/19 1545    Lab Status:  Final result Specimen:  Wound from Foot, Right Updated:  11/16/19 0730     Wound Culture Moderate growth (3+) Staphylococcus aureus     Gram Stain No WBCs seen      Occasional Gram positive cocci in clusters    Susceptibility      Staphylococcus aureus     ROBB     Clindamycin Susceptible     Erythromycin Susceptible     Inducible Clindamycin Resistance Negative     Oxacillin Susceptible     Penicillin G Resistant     Rifampin Susceptible     Tetracycline Susceptible     Trimethoprim + Sulfamethoxazole Susceptible     Vancomycin Susceptible                Susceptibility Comments     Staphylococcus aureus    This isolate does not demonstrate inducible clindamycin resistance in vitro.                     Medication Review:       Schedule Meds    atorvastatin 40 mg Oral Nightly   cefTRIAXone 2 g Intravenous Q24H   clindamycin 900 mg Intravenous Q8H   enoxaparin 40 mg Subcutaneous Q24H   famotidine 20 mg Oral BID AC   insulin glargine 30 Units Subcutaneous BID With Meals   insulin lispro 0-7 Units Subcutaneous 4x Daily With Meals & Nightly   losartan 100 mg Oral Daily   metoprolol succinate XL 50 mg Oral Daily   pantoprazole 40 mg Oral Q AM   sodium chloride 10 mL Intravenous Q12H       Infusion Meds       PRN Meds  •  acetaminophen **OR** acetaminophen **OR** acetaminophen  •  bisacodyl  •  bisacodyl  •  dextrose  •  dextrose  •  glucagon (human recombinant)  •  insulin lispro **AND** insulin lispro  •  melatonin  •  ondansetron  •  oxyCODONE  •   oxyCODONE  •  sodium chloride        Assessment/Plan       Antimicrobial Therapy   1.  IV clindamycin      day  2.  IV vancomycin      day  3.      Day  4.      Day  5.      Day      Assessment     Right leg cellulitis.  Most likely caused by Staphylococcus aureus.  The patient has streak in the upper leg.  This is typical for toxin producing organism.     Right diabetic foot with the right big toe superficial ulceration.  This is probably the source of the cellulitis.  Wound culture is a growing methicillin susceptible Staphylococcus aureus.  MRI of the right foot showed no osteomyelitis or drainable collection     Right Charcot foot with a chronic ulceration of the plantar aspect of the right foot     Diabetes mellitus with diabetic neuropathy     Plan     Discontinue IV clindamycin and IV Rocephin  Nafcillin 12 g IV daily on continuous infusion  May de-escalate to p.o. antibiotics in 1 to 2 days  Try to keep right leg elevated as much as possible  No need for surgical intervention at this point        Lisa Acosta MD  11/17/19  4:08 PM     Note is dictated utilizing voice recognition software/Dragon

## 2019-11-18 PROBLEM — E11.628 DIABETIC FOOT INFECTION: Status: ACTIVE | Noted: 2019-11-18

## 2019-11-18 PROBLEM — E66.9 OBESITY (BMI 30-39.9): Status: ACTIVE | Noted: 2019-11-18

## 2019-11-18 PROBLEM — L08.9 DIABETIC FOOT INFECTION: Status: ACTIVE | Noted: 2019-11-18

## 2019-11-18 LAB
ALBUMIN SERPL-MCNC: 3.4 G/DL (ref 3.5–5.2)
ALBUMIN/GLOB SERPL: 1 G/DL
ALP SERPL-CCNC: 55 U/L (ref 39–117)
ALT SERPL W P-5'-P-CCNC: 35 U/L (ref 1–41)
ANION GAP SERPL CALCULATED.3IONS-SCNC: 10 MMOL/L (ref 5–15)
AST SERPL-CCNC: 23 U/L (ref 1–40)
BASOPHILS # BLD AUTO: 0.1 10*3/MM3 (ref 0–0.2)
BASOPHILS NFR BLD AUTO: 1.3 % (ref 0–1.5)
BILIRUB SERPL-MCNC: 0.5 MG/DL (ref 0.2–1.2)
BUN BLD-MCNC: 13 MG/DL (ref 6–20)
BUN/CREAT SERPL: 13.4 (ref 7–25)
CALCIUM SPEC-SCNC: 9.1 MG/DL (ref 8.6–10.5)
CHLORIDE SERPL-SCNC: 105 MMOL/L (ref 98–107)
CO2 SERPL-SCNC: 24 MMOL/L (ref 22–29)
CREAT BLD-MCNC: 0.97 MG/DL (ref 0.76–1.27)
DEPRECATED RDW RBC AUTO: 43.8 FL (ref 37–54)
EOSINOPHIL # BLD AUTO: 0.4 10*3/MM3 (ref 0–0.4)
EOSINOPHIL NFR BLD AUTO: 6.6 % (ref 0.3–6.2)
ERYTHROCYTE [DISTWIDTH] IN BLOOD BY AUTOMATED COUNT: 13.8 % (ref 12.3–15.4)
GFR SERPL CREATININE-BSD FRML MDRD: 79 ML/MIN/1.73
GLOBULIN UR ELPH-MCNC: 3.3 GM/DL
GLUCOSE BLD-MCNC: 178 MG/DL (ref 65–99)
GLUCOSE BLDC GLUCOMTR-MCNC: 168 MG/DL (ref 70–105)
GLUCOSE BLDC GLUCOMTR-MCNC: 203 MG/DL (ref 70–105)
GLUCOSE BLDC GLUCOMTR-MCNC: 211 MG/DL (ref 70–105)
GLUCOSE BLDC GLUCOMTR-MCNC: 261 MG/DL (ref 70–105)
HCT VFR BLD AUTO: 36.3 % (ref 37.5–51)
HGB BLD-MCNC: 12.6 G/DL (ref 13–17.7)
LYMPHOCYTES # BLD AUTO: 1.7 10*3/MM3 (ref 0.7–3.1)
LYMPHOCYTES NFR BLD AUTO: 27.2 % (ref 19.6–45.3)
MCH RBC QN AUTO: 31.5 PG (ref 26.6–33)
MCHC RBC AUTO-ENTMCNC: 34.8 G/DL (ref 31.5–35.7)
MCV RBC AUTO: 90.5 FL (ref 79–97)
MONOCYTES # BLD AUTO: 0.7 10*3/MM3 (ref 0.1–0.9)
MONOCYTES NFR BLD AUTO: 10.7 % (ref 5–12)
NEUTROPHILS # BLD AUTO: 3.4 10*3/MM3 (ref 1.7–7)
NEUTROPHILS NFR BLD AUTO: 54.2 % (ref 42.7–76)
NRBC BLD AUTO-RTO: 0.1 /100 WBC (ref 0–0.2)
PLATELET # BLD AUTO: 201 10*3/MM3 (ref 140–450)
PMV BLD AUTO: 8.7 FL (ref 6–12)
POTASSIUM BLD-SCNC: 4.6 MMOL/L (ref 3.5–5.2)
PROT SERPL-MCNC: 6.7 G/DL (ref 6–8.5)
RBC # BLD AUTO: 4.01 10*6/MM3 (ref 4.14–5.8)
SODIUM BLD-SCNC: 139 MMOL/L (ref 136–145)
WBC NRBC COR # BLD: 6.3 10*3/MM3 (ref 3.4–10.8)

## 2019-11-18 PROCEDURE — 99233 SBSQ HOSP IP/OBS HIGH 50: CPT | Performed by: HOSPITALIST

## 2019-11-18 PROCEDURE — 85025 COMPLETE CBC W/AUTO DIFF WBC: CPT | Performed by: INTERNAL MEDICINE

## 2019-11-18 PROCEDURE — 82962 GLUCOSE BLOOD TEST: CPT

## 2019-11-18 PROCEDURE — 25010000002 ENOXAPARIN PER 10 MG: Performed by: HOSPITALIST

## 2019-11-18 PROCEDURE — 63710000001 INSULIN GLARGINE PER 5 UNITS: Performed by: HOSPITALIST

## 2019-11-18 PROCEDURE — 63710000001 INSULIN LISPRO (HUMAN) PER 5 UNITS: Performed by: HOSPITALIST

## 2019-11-18 PROCEDURE — 80053 COMPREHEN METABOLIC PANEL: CPT | Performed by: INTERNAL MEDICINE

## 2019-11-18 RX ORDER — LIDOCAINE HYDROCHLORIDE 20 MG/ML
10 INJECTION, SOLUTION INFILTRATION; PERINEURAL ONCE
Status: DISCONTINUED | OUTPATIENT
Start: 2019-11-18 | End: 2019-11-19 | Stop reason: HOSPADM

## 2019-11-18 RX ORDER — DIPHENHYDRAMINE HYDROCHLORIDE 50 MG/ML
25 INJECTION INTRAMUSCULAR; INTRAVENOUS EVERY 6 HOURS PRN
Status: DISCONTINUED | OUTPATIENT
Start: 2019-11-18 | End: 2019-11-19 | Stop reason: HOSPADM

## 2019-11-18 RX ADMIN — INSULIN GLARGINE 30 UNITS: 100 INJECTION, SOLUTION SUBCUTANEOUS at 08:29

## 2019-11-18 RX ADMIN — Medication 10 ML: at 08:28

## 2019-11-18 RX ADMIN — ENOXAPARIN SODIUM 40 MG: 40 INJECTION SUBCUTANEOUS at 17:41

## 2019-11-18 RX ADMIN — FAMOTIDINE 20 MG: 20 TABLET ORAL at 08:27

## 2019-11-18 RX ADMIN — INSULIN GLARGINE 30 UNITS: 100 INJECTION, SOLUTION SUBCUTANEOUS at 17:42

## 2019-11-18 RX ADMIN — NAFCILLIN 12 G: 10 INJECTION, POWDER, FOR SOLUTION INTRAVENOUS at 18:28

## 2019-11-18 RX ADMIN — METOPROLOL SUCCINATE 50 MG: 50 TABLET, EXTENDED RELEASE ORAL at 08:27

## 2019-11-18 RX ADMIN — INSULIN LISPRO 3 UNITS: 100 INJECTION, SOLUTION INTRAVENOUS; SUBCUTANEOUS at 17:41

## 2019-11-18 RX ADMIN — INSULIN LISPRO 3 UNITS: 100 INJECTION, SOLUTION INTRAVENOUS; SUBCUTANEOUS at 12:35

## 2019-11-18 RX ADMIN — ATORVASTATIN CALCIUM 40 MG: 40 TABLET, FILM COATED ORAL at 20:42

## 2019-11-18 RX ADMIN — LOSARTAN POTASSIUM 100 MG: 50 TABLET, FILM COATED ORAL at 08:27

## 2019-11-18 RX ADMIN — PANTOPRAZOLE SODIUM 40 MG: 40 TABLET, DELAYED RELEASE ORAL at 05:15

## 2019-11-18 RX ADMIN — FAMOTIDINE 20 MG: 20 TABLET ORAL at 17:41

## 2019-11-18 RX ADMIN — INSULIN LISPRO 2 UNITS: 100 INJECTION, SOLUTION INTRAVENOUS; SUBCUTANEOUS at 08:30

## 2019-11-18 RX ADMIN — INSULIN LISPRO 4 UNITS: 100 INJECTION, SOLUTION INTRAVENOUS; SUBCUTANEOUS at 20:46

## 2019-11-18 NOTE — PROGRESS NOTES
Infectious Diseases Progress Note      LOS: 0 days   Patient Care Team:  Tete Blank MD as PCP - General    Chief Complaint: Right leg pain, rash is improving    Subjective     The patient had no fever during the last 24 hours.  The patient is hemodynamically stable.  The patient is tolerating his current antimicrobial therapy with no side effects.  Rash on the patient's arm is improving.        Review of Systems:   Review of Systems   Constitutional: Negative.    HENT: Negative.    Eyes: Negative.    Respiratory: Negative.    Cardiovascular: Negative.    Gastrointestinal: Negative.    Genitourinary: Negative.    Musculoskeletal: Negative.    Skin: Positive for rash and wound.   Neurological: Negative.    Hematological: Negative.    Psychiatric/Behavioral: Negative.         Objective     Vital Signs  Temp:  [98 °F (36.7 °C)-99 °F (37.2 °C)] 98 °F (36.7 °C)  Heart Rate:  [74-76] 74  Resp:  [16-18] 16  BP: (148-164)/(65-90) 156/78    Physical Exam:  Physical Exam   Constitutional: He is oriented to person, place, and time. He appears well-developed and well-nourished.   HENT:   Head: Normocephalic and atraumatic.   Eyes: EOM are normal. Pupils are equal, round, and reactive to light.   Neck: Normal range of motion. Neck supple.   Cardiovascular: Normal rate, regular rhythm and normal heart sounds.   Pulmonary/Chest: Effort normal and breath sounds normal. No respiratory distress. He has no wheezes. He has no rales.   Abdominal: Soft. Bowel sounds are normal. He exhibits no distension and no mass. There is no tenderness. There is no rebound and no guarding.   Musculoskeletal: Normal range of motion. He exhibits no edema or deformity.   Improvement of the right upper leg streak.  Erythema on right lower leg is improving   Neurological: He is alert and oriented to person, place, and time. No cranial nerve deficit.   Skin: Skin is warm. No rash noted. No erythema.   Petechial rash on the right lower leg with  mild reticulation in the left ankle, rash on both lower arms is improving    Appears to be a fluid collection around the right first MPT joint   Psychiatric: He has a normal mood and affect.   Nursing note and vitals reviewed.       Results Review:    I have reviewed all clinical data, test, lab, and imaging results.     Radiology  No Radiology Exams Resulted Within Past 24 Hours    Cardiology    Laboratory  Results from last 7 days   Lab Units 11/18/19  0436   WBC 10*3/mm3 6.30   HEMOGLOBIN g/dL 12.6*   HEMATOCRIT % 36.3*   PLATELETS 10*3/mm3 201     Results from last 7 days   Lab Units 11/18/19  0436   SODIUM mmol/L 139   POTASSIUM mmol/L 4.6   CHLORIDE mmol/L 105   CO2 mmol/L 24.0   BUN mg/dL 13   CREATININE mg/dL 0.97   GLUCOSE mg/dL 178*   CALCIUM mg/dL 9.1     Results from last 7 days   Lab Units 11/18/19  0436   SODIUM mmol/L 139   POTASSIUM mmol/L 4.6   CHLORIDE mmol/L 105   CO2 mmol/L 24.0   BUN mg/dL 13   CREATININE mg/dL 0.97   GLUCOSE mg/dL 178*   CALCIUM mg/dL 9.1     Results from last 7 days   Lab Units 11/14/19  2355   CK TOTAL U/L 124     Results from last 7 days   Lab Units 11/14/19  2355   SED RATE mm/hr 48*         Microbiology   Microbiology Results (last 10 days)     Procedure Component Value - Date/Time    Wound Culture - Wound, Toe, Right [[id number]]  (Abnormal)  (Susceptibility) Collected:  11/15/19 1403    Lab Status:  Final result Specimen:  Wound from Toe, Right Updated:  11/17/19 0714     Wound Culture Scant growth (1+) Staphylococcus aureus     Gram Stain No organisms seen    Susceptibility      Staphylococcus aureus     ROBB     Clindamycin Susceptible     Erythromycin Susceptible     Inducible Clindamycin Resistance Negative     Oxacillin Susceptible     Penicillin G Resistant     Rifampin Susceptible     Tetracycline Susceptible     Trimethoprim + Sulfamethoxazole Susceptible     Vancomycin Susceptible                Susceptibility Comments     Staphylococcus aureus    This isolate  does not demonstrate inducible clindamycin resistance in vitro.               Blood Culture - Blood, Arm, Left [[id number]] Collected:  11/14/19 2354    Lab Status:  Preliminary result Specimen:  Blood from Arm, Left Updated:  11/18/19 0016     Blood Culture No growth at 3 days    Blood Culture - Blood, Arm, Right [[id number]] Collected:  11/14/19 2354    Lab Status:  Preliminary result Specimen:  Blood from Arm, Right Updated:  11/18/19 0016     Blood Culture No growth at 3 days    Wound Culture - Wound, Foot, Right [[id number]]  (Abnormal)  (Susceptibility) Collected:  11/14/19 1548    Lab Status:  Final result Specimen:  Wound from Foot, Right Updated:  11/16/19 0730     Wound Culture Moderate growth (3+) Staphylococcus aureus     Gram Stain No WBCs seen      Occasional Gram positive cocci in clusters    Susceptibility      Staphylococcus aureus     ROBB     Clindamycin Susceptible     Erythromycin Susceptible     Inducible Clindamycin Resistance Negative     Oxacillin Susceptible     Penicillin G Resistant     Rifampin Susceptible     Tetracycline Susceptible     Trimethoprim + Sulfamethoxazole Susceptible     Vancomycin Susceptible                Susceptibility Comments     Staphylococcus aureus    This isolate does not demonstrate inducible clindamycin resistance in vitro.                     Medication Review:       Schedule Meds    atorvastatin 40 mg Oral Nightly   enoxaparin 40 mg Subcutaneous Q24H   famotidine 20 mg Oral BID AC   insulin glargine 30 Units Subcutaneous BID With Meals   insulin lispro 0-7 Units Subcutaneous 4x Daily With Meals & Nightly   losartan 100 mg Oral Daily   metoprolol succinate XL 50 mg Oral Daily   pantoprazole 40 mg Oral Q AM   sodium chloride 10 mL Intravenous Q12H       Infusion Meds    nafcillin (UNIPEN) 12 g in  mL continuous infusion 12 g Last Rate: 12 g (11/18/19 0827)       PRN Meds  •  acetaminophen **OR** acetaminophen **OR** acetaminophen  •  bisacodyl  •   bisacodyl  •  dextrose  •  dextrose  •  glucagon (human recombinant)  •  insulin lispro **AND** insulin lispro  •  melatonin  •  ondansetron  •  oxyCODONE  •  oxyCODONE  •  sodium chloride        Assessment/Plan       Antimicrobial Therapy   1.  IV nafcillin     Day 2  2.      day  3.      Day  4.      Day  5.      Day      Assessment     Right leg cellulitis.  Most likely caused by Staphylococcus aureus.  The patient has streak in the upper leg.  This is typical for toxin producing organism.  -Slowly improving     Right diabetic foot with the right big toe superficial ulceration.  This is probably the source of the cellulitis.  Wound culture is a growing methicillin susceptible Staphylococcus aureus.  MRI of the right foot showed no osteomyelitis or drainable collection  -Fluid correction around the first MPT joint seen     Right Charcot foot with a chronic ulceration of the plantar aspect of the right foot     Diabetes mellitus with diabetic neuropathy    Rash on bilateral lower arms that could be related to Rocephin/clindamycin  -Improving since antibiotic change     Plan     Continue Nafcillin 12 g IV daily on continuous infusion  May de-escalate to p.o. dicloxacillin 500 mg every 6 hours for 2 weeks at discharge   Try to keep right leg elevated as much as possible  Discussed case with podiatry-patient may need bedside debridement  Continue supportive care   A.mFernando Goodwin, APRN  11/18/19  10:50 AM     Note is dictated utilizing voice recognition software/Dragon

## 2019-11-18 NOTE — PLAN OF CARE
Problem: Patient Care Overview  Goal: Plan of Care Review  Outcome: Ongoing (interventions implemented as appropriate)   11/18/19 2685   Coping/Psychosocial   Plan of Care Reviewed With patient   Plan of Care Review   Progress improving   OTHER   Outcome Summary wound to right foot getting better per dr. sandra. there is a place with possible abscess/fluid collection. dr. calle will be by this evening to potentially drain it. no complaints of pain. continiuing iv abx.

## 2019-11-18 NOTE — PROGRESS NOTES
LOS: 0 days   Patient Care Team:  Tete Blank MD as PCP - General    Chief Complaint: Right great toe infection  Subjective : Patient states overall he is doing well he has no new complaints other than a new spot he points to the plantar medial aspect of the right great toe      Review of Systems:      Objective     dressing right foot is noted to be clean dry and intact  Dressing down  Is noted to be a serous filled blister/bullae on the plantar medial  aspect of the right great toe that measures 1.1cmx1.0cm  aspect of the right great toe   the erythema and edema is noted to be remarkably reduced   The dorsal aspect of the IPJ there is also noted to be sloughing skin  Overall the toe is noted to be improved and is responding well to medical management.  There is no tracking or probing noted      Vital Signs  Temp:  [97.9 °F (36.6 °C)-99 °F (37.2 °C)] 98 °F (36.7 °C)  Heart Rate:  [68-76] 69  Resp:  [16-19] 19  BP: (148-161)/(65-90) 155/81       Results Review: Viewed and noted       Assessment/Plan       Infected diabetic foot ulcer (CMS/HCC)    Hyperlipidemia    Charcot foot due to diabetes mellitus (CMS/HCC)    Type II diabetes mellitus (CMS/HCC)    GERD (gastroesophageal reflux disease)    Essential hypertension    Obesity (BMI 30-39.9)    Diabetic foot infection (CMS/HCC)   Blister/bullae right great toe    Treatment today consisted of the removing the blister/bullae site there was noted to be serous drainage present.  There was no tracking or probing noted all sloughing skin was also removed.  A new dressing was applied consisting of Betadine gauze 4 x 4 Kerlix and an Ace.  Need to ensure that there is no wrinkling or pressure points to the foot since patient is neuropathic.  Pt will continue Betadine wet-to-dry twice daily.  Patient is a stay in a surgical shoe or cam walker boot when up.  Discussed with Dr. Acosta.  Patient was told that his pulse in his right foot is not as strong as in his  left.  Most likely secondary to the edema in the right lower extremity but discussed with patient he would like to do an MARISELA test.  We will try to get that scheduled for first thing in the morning.  No plans for any surgical intervention at this time.  Patient will follow-up with the wound care center upon discharge can also follow-up in the office first week in December.  Any recurrent blistering needs to notify myself or the wound care center immediately      Santy Selby DPM  11/18/19  4:58 PM

## 2019-11-18 NOTE — PROGRESS NOTES
"Hospitalist Team  LOS 0 days      Patient Care Team:  Tete Blank MD as PCP - General      Chief Complaint / Subjective  Right foot ulcer    HPI    Interval History and ROS:     Wound cultures with MSSA and on Naficillin per ID recommendations. Afebrile.    Review of Systems   All other systems reviewed and are negative.      History reviewed. No pertinent family history.    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   • Elevated cholesterol    • GERD (gastroesophageal reflux disease)    • Hypertension        Social History     Socioeconomic History   • Marital status:      Spouse name: Not on file   • Number of children: Not on file   • Years of education: Not on file   • Highest education level: Not on file   Tobacco Use   • Smoking status: Never Smoker   • Smokeless tobacco: Never Used   Substance and Sexual Activity   • Alcohol use: No     Frequency: Never   • Drug use: No   • Sexual activity: Defer           Objective      Vital Signs  Temp:  [97.9 °F (36.6 °C)-99 °F (37.2 °C)] 97.9 °F (36.6 °C)  Heart Rate:  [68-76] 68  Resp:  [16-18] 16  BP: (148-164)/(65-90) 155/81    Oxygen Therapy  SpO2: 97 %  Pulse Oximetry Type: Intermittent  Device (Oxygen Therapy): room air  Flowsheet Rows      First Filed Value   Admission Height  185.4 cm (73\") Documented at 11/14/2019 1848   Admission Weight  129 kg (285 lb 4.4 oz) Documented at 11/14/2019 1848        Intake & Output (last 3 days)       11/15 0701 - 11/16 0700 11/16 0701 - 11/17 0700 11/17 0701 - 11/18 0700 11/18 0701 - 11/19 0700    P.O. 720 2280 1320 1200    Total Intake(mL/kg) 720 (5.5) 2280 (18) 1320 (10.4) 1200 (9.4)    Net +720 +2280 +1320 +1200            Urine Unmeasured Occurrence 4 x 7 x 4 x 2 x    Stool Unmeasured Occurrence  2 x  1 x        Lines, Drains & Airways    Active LDAs     Name:   Placement date:   Placement time:   Site:   Days:    Peripheral IV " 11/17/19 1029 Anterior;Left Wrist   11/17/19    1029    Wrist   1                Physical Exam:    Physical Exam   Constitutional: He is oriented to person, place, and time. He appears well-developed and well-nourished.   HENT:   Head: Normocephalic and atraumatic.   Eyes: EOM are normal. Pupils are equal, round, and reactive to light.   Neck: Normal range of motion. Neck supple.   Cardiovascular: Normal rate and normal heart sounds.   Pulmonary/Chest: Effort normal and breath sounds normal.   Abdominal: Soft. Bowel sounds are normal.   Musculoskeletal: Normal range of motion.        Lymphadenopathy:     He has no cervical adenopathy.   Neurological: He is alert and oriented to person, place, and time. No cranial nerve deficit or sensory deficit.   Skin: Skin is warm and dry. No rash noted.   Psychiatric: He has a normal mood and affect. His speech is normal and behavior is normal. Judgment and thought content normal. Cognition and memory are normal.         Procedures: none        Assessment / Plan      Infected diabetic foot ulcer (CMS/HCC)    Hyperlipidemia    Charcot foot due to diabetes mellitus (CMS/HCC)    Type II diabetes mellitus (CMS/HCC)    GERD (gastroesophageal reflux disease)    Essential hypertension    Obesity (BMI 30-39.9)    1.  Right foot first toe diabetic ulcer/cellulitis:  --Wound cultures MSSA  --No surgical intervention per podiatry  --Infectious disease consulted  --On Nafcillin     2. IDDM complicated with Charcot foot  --Continue ISS and reduced insulin dose  --Hemoglobin A1c in a.m.  --On high-dose insulin at home     3.  VTE prophylaxis: SCD        I discussed the patients findings and my recommendations with patient.      Results Review:     I reviewed the patient's new clinical results.    Results from last 7 days   Lab Units 11/18/19  0436 11/17/19  0421 11/16/19  0514   WBC 10*3/mm3 6.30 8.80 8.30   HEMOGLOBIN g/dL 12.6* 13.0 12.5*   HEMATOCRIT % 36.3* 38.3 36.1*   PLATELETS 10*3/mm3  201 200 166     Results from last 7 days   Lab Units 11/18/19  0436 11/17/19  0421 11/16/19  0514   SODIUM mmol/L 139 141 141   POTASSIUM mmol/L 4.6 4.3 4.1   CHLORIDE mmol/L 105 106 106   CO2 mmol/L 24.0 22.0 25.0   BUN mg/dL 13 14 16   CREATININE mg/dL 0.97 1.00 0.98   CALCIUM mg/dL 9.1 9.1 9.1   BILIRUBIN mg/dL 0.5 0.6 0.5   ALK PHOS U/L 55 56 54   ALT (SGPT) U/L 35 37 31   AST (SGOT) U/L 23 25 21   GLUCOSE mg/dL 178* 125* 152*     Results from last 7 days   Lab Units 11/16/19  0514 11/15/19  0430   MAGNESIUM mg/dL 2.0 1.7     Lab Results   Component Value Date    CALCIUM 9.1 11/18/2019    PHOS 3.7 11/15/2019     No results found for: HGBA1C                Microbiology Results (last 10 days)     Procedure Component Value - Date/Time    Wound Culture - Wound, Toe, Right [[id number]]  (Abnormal)  (Susceptibility) Collected:  11/15/19 1403    Lab Status:  Final result Specimen:  Wound from Toe, Right Updated:  11/17/19 0714     Wound Culture Scant growth (1+) Staphylococcus aureus     Gram Stain No organisms seen    Susceptibility      Staphylococcus aureus     ROBB     Clindamycin Susceptible     Erythromycin Susceptible     Inducible Clindamycin Resistance Negative     Oxacillin Susceptible     Penicillin G Resistant     Rifampin Susceptible     Tetracycline Susceptible     Trimethoprim + Sulfamethoxazole Susceptible     Vancomycin Susceptible                Susceptibility Comments     Staphylococcus aureus    This isolate does not demonstrate inducible clindamycin resistance in vitro.               Blood Culture - Blood, Arm, Left [[id number]] Collected:  11/14/19 2354    Lab Status:  Preliminary result Specimen:  Blood from Arm, Left Updated:  11/18/19 0016     Blood Culture No growth at 3 days    Blood Culture - Blood, Arm, Right [[id number]] Collected:  11/14/19 2354    Lab Status:  Preliminary result Specimen:  Blood from Arm, Right Updated:  11/18/19 0016     Blood Culture No growth at 3 days    Wound Culture -  Wound, Foot, Right [[id number]]  (Abnormal)  (Susceptibility) Collected:  11/14/19 1543    Lab Status:  Final result Specimen:  Wound from Foot, Right Updated:  11/16/19 0730     Wound Culture Moderate growth (3+) Staphylococcus aureus     Gram Stain No WBCs seen      Occasional Gram positive cocci in clusters    Susceptibility      Staphylococcus aureus     ROBB     Clindamycin Susceptible     Erythromycin Susceptible     Inducible Clindamycin Resistance Negative     Oxacillin Susceptible     Penicillin G Resistant     Rifampin Susceptible     Tetracycline Susceptible     Trimethoprim + Sulfamethoxazole Susceptible     Vancomycin Susceptible                Susceptibility Comments     Staphylococcus aureus    This isolate does not demonstrate inducible clindamycin resistance in vitro.                     ECG/EMG Results (most recent)     None                    Xr Foot 3+ View Right    Result Date: 11/15/2019   1. Diffuse soft tissue swelling, without radiographic evidence of acute fracture or osteomyelitis. 2. Severe chronic changes in the midfoot, indicating Charcot arthropathy. 3. Moderate to advanced DJD the great toe MTP joint.  Electronically Signed By-David Patel On:11/15/2019 8:53 AM This report was finalized on [id number] by  David Patel, .    Mri Foot Right Without Contrast    Result Date: 11/15/2019   1. Soft tissue wound at the dorsal great toe with diffuse soft tissue edema that likely represents cellulitis. No loculated fluid collection to suggest abscess. 2. No MR evidence of osteomyelitis. 3. Partially imaged severe chronic changes in the midfoot, consistent with Charcot arthropathy. 4. Moderate DJD at the great toe MTP joint. 5. Severe diffuse muscular fatty atrophy, likely neuropathic.    Electronically Signed ByRhea Patel On:11/15/2019 1:00 PM This report was finalized on [id number] by  David Patel, .      Xrays, labs reviewed personally by physician.    Medication Review:   I have  reviewed the patient's current medication list    Scheduled Meds    atorvastatin 40 mg Oral Nightly   enoxaparin 40 mg Subcutaneous Q24H   famotidine 20 mg Oral BID AC   insulin glargine 30 Units Subcutaneous BID With Meals   insulin lispro 0-7 Units Subcutaneous 4x Daily With Meals & Nightly   lidocaine 10 mL Infiltration Once   losartan 100 mg Oral Daily   metoprolol succinate XL 50 mg Oral Daily   pantoprazole 40 mg Oral Q AM   sodium chloride 10 mL Intravenous Q12H       Meds Infusions    nafcillin (UNIPEN) 12 g in  mL continuous infusion 12 g Last Rate: 12 g (11/18/19 1254)       Meds PRN  •  acetaminophen **OR** acetaminophen **OR** acetaminophen  •  bisacodyl  •  bisacodyl  •  dextrose  •  dextrose  •  glucagon (human recombinant)  •  insulin lispro **AND** insulin lispro  •  melatonin  •  ondansetron  •  oxyCODONE  •  oxyCODONE  •  sodium chloride        Jaxon Campbell DO  11/18/19  2:27 PM

## 2019-11-19 ENCOUNTER — HOSPITAL ENCOUNTER (INPATIENT)
Dept: CARDIOLOGY | Facility: HOSPITAL | Age: 59
Discharge: HOME | End: 2019-11-19

## 2019-11-19 VITALS
RESPIRATION RATE: 18 BRPM | OXYGEN SATURATION: 96 % | WEIGHT: 280.65 LBS | DIASTOLIC BLOOD PRESSURE: 84 MMHG | HEART RATE: 65 BPM | HEIGHT: 73 IN | SYSTOLIC BLOOD PRESSURE: 158 MMHG | TEMPERATURE: 98 F | BODY MASS INDEX: 37.2 KG/M2

## 2019-11-19 LAB
ALBUMIN SERPL-MCNC: 3.7 G/DL (ref 3.5–5.2)
ALBUMIN/GLOB SERPL: 1.1 G/DL
ALP SERPL-CCNC: 58 U/L (ref 39–117)
ALT SERPL W P-5'-P-CCNC: 34 U/L (ref 1–41)
ANION GAP SERPL CALCULATED.3IONS-SCNC: 11 MMOL/L (ref 5–15)
AST SERPL-CCNC: 21 U/L (ref 1–40)
BASOPHILS # BLD AUTO: 0.1 10*3/MM3 (ref 0–0.2)
BASOPHILS NFR BLD AUTO: 1.9 % (ref 0–1.5)
BH CV LOWER ARTERIAL LEFT ABI RATIO: 1.21
BH CV LOWER ARTERIAL LEFT DORSALIS PEDIS SYS MAX: 185 MMHG
BH CV LOWER ARTERIAL LEFT GREAT TOE SYS MAX: 166 MMHG
BH CV LOWER ARTERIAL LEFT POST TIBIAL SYS MAX: 178 MMHG
BH CV LOWER ARTERIAL LEFT TBI RATIO: 1.08
BH CV LOWER ARTERIAL RIGHT ABI RATIO: 1.24
BH CV LOWER ARTERIAL RIGHT DORSALIS PEDIS SYS MAX: 168 MMHG
BH CV LOWER ARTERIAL RIGHT GREAT TOE SYS MAX: 132 MMHG
BH CV LOWER ARTERIAL RIGHT POST TIBIAL SYS MAX: 189 MMHG
BH CV LOWER ARTERIAL RIGHT TBI RATIO: 0.86
BILIRUB SERPL-MCNC: 0.4 MG/DL (ref 0.2–1.2)
BUN BLD-MCNC: 10 MG/DL (ref 6–20)
BUN/CREAT SERPL: 9.1 (ref 7–25)
CALCIUM SPEC-SCNC: 9.3 MG/DL (ref 8.6–10.5)
CHLORIDE SERPL-SCNC: 102 MMOL/L (ref 98–107)
CO2 SERPL-SCNC: 25 MMOL/L (ref 22–29)
CREAT BLD-MCNC: 1.1 MG/DL (ref 0.76–1.27)
DEPRECATED RDW RBC AUTO: 43.8 FL (ref 37–54)
EOSINOPHIL # BLD AUTO: 0.4 10*3/MM3 (ref 0–0.4)
EOSINOPHIL NFR BLD AUTO: 6.3 % (ref 0.3–6.2)
ERYTHROCYTE [DISTWIDTH] IN BLOOD BY AUTOMATED COUNT: 14 % (ref 12.3–15.4)
GFR SERPL CREATININE-BSD FRML MDRD: 69 ML/MIN/1.73
GLOBULIN UR ELPH-MCNC: 3.5 GM/DL
GLUCOSE BLD-MCNC: 150 MG/DL (ref 65–99)
GLUCOSE BLDC GLUCOMTR-MCNC: 156 MG/DL (ref 70–105)
GLUCOSE BLDC GLUCOMTR-MCNC: 265 MG/DL (ref 70–105)
GLUCOSE BLDC GLUCOMTR-MCNC: 268 MG/DL (ref 70–105)
HCT VFR BLD AUTO: 37.4 % (ref 37.5–51)
HGB BLD-MCNC: 12.8 G/DL (ref 13–17.7)
LYMPHOCYTES # BLD AUTO: 1.9 10*3/MM3 (ref 0.7–3.1)
LYMPHOCYTES NFR BLD AUTO: 28.1 % (ref 19.6–45.3)
MCH RBC QN AUTO: 31 PG (ref 26.6–33)
MCHC RBC AUTO-ENTMCNC: 34.3 G/DL (ref 31.5–35.7)
MCV RBC AUTO: 90.4 FL (ref 79–97)
MONOCYTES # BLD AUTO: 0.8 10*3/MM3 (ref 0.1–0.9)
MONOCYTES NFR BLD AUTO: 11 % (ref 5–12)
NEUTROPHILS # BLD AUTO: 3.6 10*3/MM3 (ref 1.7–7)
NEUTROPHILS NFR BLD AUTO: 52.7 % (ref 42.7–76)
NRBC BLD AUTO-RTO: 0 /100 WBC (ref 0–0.2)
PLATELET # BLD AUTO: 214 10*3/MM3 (ref 140–450)
PMV BLD AUTO: 8.8 FL (ref 6–12)
POTASSIUM BLD-SCNC: 3.9 MMOL/L (ref 3.5–5.2)
PROT SERPL-MCNC: 7.2 G/DL (ref 6–8.5)
RBC # BLD AUTO: 4.13 10*6/MM3 (ref 4.14–5.8)
SODIUM BLD-SCNC: 138 MMOL/L (ref 136–145)
UPPER ARTERIAL LEFT ARM BRACHIAL SYS MAX: 151 MMHG
UPPER ARTERIAL RIGHT ARM BRACHIAL SYS MAX: 153 MMHG
WBC NRBC COR # BLD: 6.9 10*3/MM3 (ref 3.4–10.8)

## 2019-11-19 PROCEDURE — 85025 COMPLETE CBC W/AUTO DIFF WBC: CPT | Performed by: NURSE PRACTITIONER

## 2019-11-19 PROCEDURE — 99238 HOSP IP/OBS DSCHRG MGMT 30/<: CPT | Performed by: HOSPITALIST

## 2019-11-19 PROCEDURE — 63710000001 INSULIN GLARGINE PER 5 UNITS: Performed by: HOSPITALIST

## 2019-11-19 PROCEDURE — 82962 GLUCOSE BLOOD TEST: CPT

## 2019-11-19 PROCEDURE — 93922 UPR/L XTREMITY ART 2 LEVELS: CPT

## 2019-11-19 PROCEDURE — 63710000001 INSULIN LISPRO (HUMAN) PER 5 UNITS: Performed by: HOSPITALIST

## 2019-11-19 PROCEDURE — 80053 COMPREHEN METABOLIC PANEL: CPT | Performed by: NURSE PRACTITIONER

## 2019-11-19 RX ADMIN — INSULIN GLARGINE 30 UNITS: 100 INJECTION, SOLUTION SUBCUTANEOUS at 17:09

## 2019-11-19 RX ADMIN — FAMOTIDINE 20 MG: 20 TABLET ORAL at 09:46

## 2019-11-19 RX ADMIN — METOPROLOL SUCCINATE 50 MG: 50 TABLET, EXTENDED RELEASE ORAL at 09:46

## 2019-11-19 RX ADMIN — Medication 10 ML: at 09:46

## 2019-11-19 RX ADMIN — INSULIN LISPRO 4 UNITS: 100 INJECTION, SOLUTION INTRAVENOUS; SUBCUTANEOUS at 11:46

## 2019-11-19 RX ADMIN — FAMOTIDINE 20 MG: 20 TABLET ORAL at 17:09

## 2019-11-19 RX ADMIN — INSULIN LISPRO 2 UNITS: 100 INJECTION, SOLUTION INTRAVENOUS; SUBCUTANEOUS at 09:46

## 2019-11-19 RX ADMIN — INSULIN LISPRO 4 UNITS: 100 INJECTION, SOLUTION INTRAVENOUS; SUBCUTANEOUS at 17:09

## 2019-11-19 RX ADMIN — PANTOPRAZOLE SODIUM 40 MG: 40 TABLET, DELAYED RELEASE ORAL at 05:35

## 2019-11-19 RX ADMIN — LOSARTAN POTASSIUM 100 MG: 50 TABLET, FILM COATED ORAL at 09:46

## 2019-11-19 RX ADMIN — INSULIN GLARGINE 30 UNITS: 100 INJECTION, SOLUTION SUBCUTANEOUS at 09:47

## 2019-11-19 NOTE — PROGRESS NOTES
Continued Stay Note  MARTINE Rivas     Patient Name: Agapito Canseco  MRN: [id number]  Today's Date: 11/19/2019    Admit Date: 11/14/2019    Discharge Plan     Row Name 11/19/19 1337       Plan    Plan  Anicipate routine home.    Final Note  still works.                Mady Lua RN

## 2019-11-19 NOTE — PLAN OF CARE
Problem: Patient Care Overview  Goal: Plan of Care Review  Outcome: Ongoing (interventions implemented as appropriate)   11/19/19 0150   Coping/Psychosocial   Plan of Care Reviewed With patient   OTHER   Outcome Summary Patient has not complained of pain since arriving to the unit. Continuing IV abx. Patients foot is wrapped in a betadine wet to dry dressing and is propped up on pillows. Will continue to monitor patients pain.     Goal: Individualization and Mutuality  Outcome: Ongoing (interventions implemented as appropriate)    Goal: Interprofessional Rounds/Family Conf  Outcome: Ongoing (interventions implemented as appropriate)      Problem: Skin Injury Risk (Adult)  Goal: Identify Related Risk Factors and Signs and Symptoms  Outcome: Ongoing (interventions implemented as appropriate)    Goal: Skin Health and Integrity  Outcome: Ongoing (interventions implemented as appropriate)

## 2019-11-19 NOTE — PROGRESS NOTES
Infectious Diseases Progress Note      LOS: 1 day   Patient Care Team:  Tete Blank MD as PCP - General    Chief Complaint: Right leg pain and rash is improving    Subjective     The patient had no fever during the last 24 hours.  The patient is hemodynamically stable.  The patient is tolerating his current antimicrobial therapy with no side effects.  Rash on the patient's arm is improving.        Review of Systems:   Review of Systems   Constitutional: Negative.    HENT: Negative.    Eyes: Negative.    Respiratory: Negative.    Cardiovascular: Negative.    Gastrointestinal: Negative.    Genitourinary: Negative.    Musculoskeletal: Negative.    Skin: Positive for rash and wound.   Neurological: Negative.    Hematological: Negative.    Psychiatric/Behavioral: Negative.         Objective     Vital Signs  Temp:  [97.7 °F (36.5 °C)-98.3 °F (36.8 °C)] 97.7 °F (36.5 °C)  Heart Rate:  [69-73] 72  Resp:  [15-19] 17  BP: (153-168)/(76-92) 155/92    Physical Exam:  Physical Exam   Constitutional: He is oriented to person, place, and time. He appears well-developed and well-nourished.   HENT:   Head: Normocephalic and atraumatic.   Eyes: EOM are normal. Pupils are equal, round, and reactive to light.   Neck: Normal range of motion. Neck supple.   Cardiovascular: Normal rate, regular rhythm and normal heart sounds.   Pulmonary/Chest: Effort normal and breath sounds normal. No respiratory distress. He has no wheezes. He has no rales.   Abdominal: Soft. Bowel sounds are normal. He exhibits no distension and no mass. There is no tenderness. There is no rebound and no guarding.   Musculoskeletal: Normal range of motion. He exhibits no edema or deformity.   Improvement of the right upper leg streak.  Erythema on right lower leg is improving   Neurological: He is alert and oriented to person, place, and time. No cranial nerve deficit.   Skin: Skin is warm. No rash noted. No erythema.   Petechial rash on the right lower leg  with mild reticulation in the left ankle, rash on both lower arms is improving     Psychiatric: He has a normal mood and affect.   Nursing note and vitals reviewed.       Results Review:    I have reviewed all clinical data, test, lab, and imaging results.     Radiology  No Radiology Exams Resulted Within Past 24 Hours    Cardiology    Laboratory  Results from last 7 days   Lab Units 11/19/19  0241   WBC 10*3/mm3 6.90   HEMOGLOBIN g/dL 12.8*   HEMATOCRIT % 37.4*   PLATELETS 10*3/mm3 214     Results from last 7 days   Lab Units 11/19/19  0241   SODIUM mmol/L 138   POTASSIUM mmol/L 3.9   CHLORIDE mmol/L 102   CO2 mmol/L 25.0   BUN mg/dL 10   CREATININE mg/dL 1.10   GLUCOSE mg/dL 150*   CALCIUM mg/dL 9.3     Results from last 7 days   Lab Units 11/19/19  0241   SODIUM mmol/L 138   POTASSIUM mmol/L 3.9   CHLORIDE mmol/L 102   CO2 mmol/L 25.0   BUN mg/dL 10   CREATININE mg/dL 1.10   GLUCOSE mg/dL 150*   CALCIUM mg/dL 9.3     Results from last 7 days   Lab Units 11/14/19  2355   CK TOTAL U/L 124     Results from last 7 days   Lab Units 11/14/19  2355   SED RATE mm/hr 48*         Microbiology   Microbiology Results (last 10 days)     Procedure Component Value - Date/Time    Wound Culture - Wound, Toe, Right [[id number]]  (Abnormal)  (Susceptibility) Collected:  11/15/19 1403    Lab Status:  Final result Specimen:  Wound from Toe, Right Updated:  11/17/19 0714     Wound Culture Scant growth (1+) Staphylococcus aureus     Gram Stain No organisms seen    Susceptibility      Staphylococcus aureus     ROBB     Clindamycin Susceptible     Erythromycin Susceptible     Inducible Clindamycin Resistance Negative     Oxacillin Susceptible     Penicillin G Resistant     Rifampin Susceptible     Tetracycline Susceptible     Trimethoprim + Sulfamethoxazole Susceptible     Vancomycin Susceptible                Susceptibility Comments     Staphylococcus aureus    This isolate does not demonstrate inducible clindamycin resistance in vitro.                Blood Culture - Blood, Arm, Left [[id number]] Collected:  11/14/19 2354    Lab Status:  Preliminary result Specimen:  Blood from Arm, Left Updated:  11/19/19 0004     Blood Culture No growth at 4 days    Blood Culture - Blood, Arm, Right [[id number]] Collected:  11/14/19 2354    Lab Status:  Preliminary result Specimen:  Blood from Arm, Right Updated:  11/19/19 0004     Blood Culture No growth at 4 days    Wound Culture - Wound, Foot, Right [[id number]]  (Abnormal)  (Susceptibility) Collected:  11/14/19 1545    Lab Status:  Final result Specimen:  Wound from Foot, Right Updated:  11/16/19 0730     Wound Culture Moderate growth (3+) Staphylococcus aureus     Gram Stain No WBCs seen      Occasional Gram positive cocci in clusters    Susceptibility      Staphylococcus aureus     ROBB     Clindamycin Susceptible     Erythromycin Susceptible     Inducible Clindamycin Resistance Negative     Oxacillin Susceptible     Penicillin G Resistant     Rifampin Susceptible     Tetracycline Susceptible     Trimethoprim + Sulfamethoxazole Susceptible     Vancomycin Susceptible                Susceptibility Comments     Staphylococcus aureus    This isolate does not demonstrate inducible clindamycin resistance in vitro.                     Medication Review:       Schedule Meds    atorvastatin 40 mg Oral Nightly   enoxaparin 40 mg Subcutaneous Q24H   famotidine 20 mg Oral BID AC   insulin glargine 30 Units Subcutaneous BID With Meals   insulin lispro 0-7 Units Subcutaneous 4x Daily With Meals & Nightly   lidocaine 10 mL Infiltration Once   losartan 100 mg Oral Daily   metoprolol succinate XL 50 mg Oral Daily   pantoprazole 40 mg Oral Q AM   sodium chloride 10 mL Intravenous Q12H       Infusion Meds    nafcillin (UNIPEN) 12 g in  mL continuous infusion 12 g Last Rate: 12 g (11/18/19 1828)       PRN Meds  •  acetaminophen **OR** acetaminophen **OR** acetaminophen  •  bisacodyl  •  bisacodyl  •  dextrose  •  dextrose  •   diphenhydrAMINE  •  glucagon (human recombinant)  •  insulin lispro **AND** insulin lispro  •  melatonin  •  ondansetron  •  oxyCODONE  •  oxyCODONE  •  sodium chloride        Assessment/Plan       Antimicrobial Therapy   1.  IV nafcillin     Day 3  2.      day  3.      Day  4.      Day  5.      Day      Assessment     Right leg cellulitis.  The patient improved significantly and currently has mild residual erythema of the right foot and the right big toe site.  This is most likely caused by Staphylococcus aureus.  Wound culture growing methicillin susceptible Staphylococcus aureus.  MRI showed no osteomyelitis or deep abscess.  The patient was noted to have a superficial blister which was opened up by podiatry service.  Okay to discharge the patient home today       Right Charcot foot with a chronic ulceration of the plantar aspect of the right foot     Diabetes mellitus with diabetic neuropathy    Rash on bilateral lower arms that could be related to Rocephin/clindamycin  -Improving since antibiotic change     Plan     Ok to Discharge the patient home today   Can discharge home today on p.o. dicloxacillin 500 mg every 6 hours for 14 days.        Suki Goodwin, JACKELYN  11/19/19  11:02 AM     Note is dictated utilizing voice recognition software/Dragon

## 2019-11-19 NOTE — PLAN OF CARE
Problem: Patient Care Overview  Goal: Plan of Care Review  Outcome: Ongoing (interventions implemented as appropriate)   11/19/19 0167   Coping/Psychosocial   Plan of Care Reviewed With patient   Plan of Care Review   Progress improving   OTHER   Outcome Summary Patient continues on IV antibiotics. Patients dressing changed. No complaints of pain. Will continue to monitor     Goal: Individualization and Mutuality  Outcome: Ongoing (interventions implemented as appropriate)    Goal: Discharge Needs Assessment  Outcome: Ongoing (interventions implemented as appropriate)    Goal: Interprofessional Rounds/Family Conf  Outcome: Ongoing (interventions implemented as appropriate)      Problem: Skin Injury Risk (Adult)  Goal: Identify Related Risk Factors and Signs and Symptoms  Outcome: Ongoing (interventions implemented as appropriate)    Goal: Skin Health and Integrity  Outcome: Ongoing (interventions implemented as appropriate)

## 2019-11-20 ENCOUNTER — READMISSION MANAGEMENT (OUTPATIENT)
Dept: CALL CENTER | Facility: HOSPITAL | Age: 59
End: 2019-11-20

## 2019-11-20 LAB
BACTERIA SPEC AEROBE CULT: NORMAL
BACTERIA SPEC AEROBE CULT: NORMAL

## 2019-11-20 NOTE — PROGRESS NOTES
Case Management Discharge Note      Final Note: home           Final Discharge Disposition Code: 01 - home or self-care

## 2019-11-20 NOTE — OUTREACH NOTE
Prep Survey      Responses   Facility patient discharged from?  [facility]   Is patient eligible?  Yes   Discharge diagnosis  Infected diabetic foot ulcer   Does the patient have one of the following disease processes/diagnoses(primary or secondary)?  Other   Does the patient have Home health ordered?  No   Is there a DME ordered?  No   Comments regarding appointments  [facility] in 1 week   Medication alerts for this patient  dicloxacillin   Prep survey completed?  Yes          Susie Haque LPN

## 2019-11-21 ENCOUNTER — READMISSION MANAGEMENT (OUTPATIENT)
Dept: CALL CENTER | Facility: HOSPITAL | Age: 59
End: 2019-11-21

## 2019-11-21 NOTE — OUTREACH NOTE
Medical Week 1 Survey      Responses   Facility patient discharged from?  [facility]   Does the patient have one of the following disease processes/diagnoses(primary or secondary)?  Other   Is there a successful TCM telephone encounter documented?  No   Week 1 attempt successful?  Yes   Call start time  1532   Call end time  1535   Medication alerts for this patient  dicloxacillin   Meds reviewed with patient/caregiver?  Yes   Is the patient having any side effects they believe may be caused by any medication additions or changes?  No   Does the patient have all medications ordered at discharge?  Yes   Is the patient taking all medications as directed (includes completed medication regime)?  Yes   Comments regarding appointments  PATIENT HAS APPOINTMENT WITH WOUND CARE 11/25   Does the patient have a primary care provider?   Yes   Does the patient have an appointment with their PCP within 7 days of discharge?  Greater than 7 days   What is preventing the patient from scheduling follow up appointments within 7 days of discharge?  Earlier appointment not available   Has the patient kept scheduled appointments due by today?  N/A   Has home health visited the patient within 72 hours of discharge?  N/A   Did the patient receive a copy of their discharge instructions?  Yes   Nursing interventions  Reviewed instructions with patient   What is the patient's perception of their health status since discharge?  Improving   Is the patient/caregiver able to teach back signs and symptoms related to disease process for when to call PCP?  Yes   Is the patient/caregiver able to teach back signs and symptoms related to disease process for when to call 911?  Yes   Is the patient/caregiver able to teach back the hierarchy of who to call/visit for symptoms/problems? PCP, Specialist, Home health nurse, Urgent Care, ED, 911  Yes   Week 1 call completed?  Yes          Mikayla Arias LPN

## 2019-11-25 ENCOUNTER — OFFICE VISIT (OUTPATIENT)
Dept: WOUND CARE | Facility: HOSPITAL | Age: 59
End: 2019-11-25

## 2019-11-25 PROCEDURE — G0463 HOSPITAL OUTPT CLINIC VISIT: HCPCS

## 2019-12-02 ENCOUNTER — READMISSION MANAGEMENT (OUTPATIENT)
Dept: CALL CENTER | Facility: HOSPITAL | Age: 59
End: 2019-12-02

## 2019-12-02 ENCOUNTER — OFFICE VISIT (OUTPATIENT)
Dept: WOUND CARE | Facility: HOSPITAL | Age: 59
End: 2019-12-02

## 2019-12-02 PROCEDURE — G0463 HOSPITAL OUTPT CLINIC VISIT: HCPCS

## 2019-12-02 NOTE — OUTREACH NOTE
Medical Week 2 Survey      Responses   Facility patient discharged from?  [facility]   Does the patient have one of the following disease processes/diagnoses(primary or secondary)?  Other   Week 2 attempt successful?  No   Revoke  Decline to participate [No answer/Left voicemail]          Susie Haque LPN

## 2019-12-09 ENCOUNTER — OFFICE VISIT (OUTPATIENT)
Dept: WOUND CARE | Facility: HOSPITAL | Age: 59
End: 2019-12-09

## 2019-12-09 PROCEDURE — G0463 HOSPITAL OUTPT CLINIC VISIT: HCPCS

## 2019-12-16 ENCOUNTER — OFFICE VISIT (OUTPATIENT)
Dept: WOUND CARE | Facility: HOSPITAL | Age: 59
End: 2019-12-16

## 2019-12-23 ENCOUNTER — OFFICE VISIT (OUTPATIENT)
Dept: WOUND CARE | Facility: HOSPITAL | Age: 59
End: 2019-12-23

## 2019-12-23 PROCEDURE — G0463 HOSPITAL OUTPT CLINIC VISIT: HCPCS

## 2020-01-06 ENCOUNTER — OFFICE VISIT (OUTPATIENT)
Dept: WOUND CARE | Facility: HOSPITAL | Age: 60
End: 2020-01-06

## 2020-01-13 ENCOUNTER — OFFICE VISIT (OUTPATIENT)
Dept: WOUND CARE | Facility: HOSPITAL | Age: 60
End: 2020-01-13

## 2020-01-13 PROCEDURE — G0463 HOSPITAL OUTPT CLINIC VISIT: HCPCS

## 2020-01-27 ENCOUNTER — OFFICE VISIT (OUTPATIENT)
Dept: WOUND CARE | Facility: HOSPITAL | Age: 60
End: 2020-01-27

## 2020-02-17 ENCOUNTER — OFFICE VISIT (OUTPATIENT)
Dept: WOUND CARE | Facility: HOSPITAL | Age: 60
End: 2020-02-17

## 2020-03-02 ENCOUNTER — OFFICE VISIT (OUTPATIENT)
Dept: WOUND CARE | Facility: HOSPITAL | Age: 60
End: 2020-03-02

## 2020-03-16 ENCOUNTER — APPOINTMENT (OUTPATIENT)
Dept: WOUND CARE | Facility: HOSPITAL | Age: 60
End: 2020-03-16

## 2020-03-30 ENCOUNTER — OFFICE VISIT (OUTPATIENT)
Dept: WOUND CARE | Facility: HOSPITAL | Age: 60
End: 2020-03-30

## 2020-04-13 ENCOUNTER — OFFICE VISIT (OUTPATIENT)
Dept: WOUND CARE | Facility: HOSPITAL | Age: 60
End: 2020-04-13

## 2020-04-27 ENCOUNTER — OFFICE VISIT (OUTPATIENT)
Dept: WOUND CARE | Facility: HOSPITAL | Age: 60
End: 2020-04-27

## 2020-05-11 ENCOUNTER — OFFICE VISIT (OUTPATIENT)
Dept: WOUND CARE | Facility: HOSPITAL | Age: 60
End: 2020-05-11

## 2020-05-18 ENCOUNTER — OFFICE VISIT (OUTPATIENT)
Dept: WOUND CARE | Facility: HOSPITAL | Age: 60
End: 2020-05-18

## 2020-05-18 ENCOUNTER — APPOINTMENT (OUTPATIENT)
Dept: WOUND CARE | Facility: HOSPITAL | Age: 60
End: 2020-05-18

## 2020-05-19 ENCOUNTER — APPOINTMENT (OUTPATIENT)
Dept: WOUND CARE | Facility: HOSPITAL | Age: 60
End: 2020-05-19

## 2020-06-02 ENCOUNTER — APPOINTMENT (OUTPATIENT)
Dept: WOUND CARE | Facility: HOSPITAL | Age: 60
End: 2020-06-02

## 2020-06-09 ENCOUNTER — OFFICE VISIT (OUTPATIENT)
Dept: WOUND CARE | Facility: HOSPITAL | Age: 60
End: 2020-06-09

## 2020-06-22 ENCOUNTER — OFFICE VISIT (OUTPATIENT)
Dept: WOUND CARE | Facility: HOSPITAL | Age: 60
End: 2020-06-22

## 2020-07-06 ENCOUNTER — APPOINTMENT (OUTPATIENT)
Dept: WOUND CARE | Facility: HOSPITAL | Age: 60
End: 2020-07-06

## 2020-07-16 ENCOUNTER — OFFICE VISIT (OUTPATIENT)
Dept: WOUND CARE | Facility: HOSPITAL | Age: 60
End: 2020-07-16

## 2020-08-06 ENCOUNTER — OFFICE VISIT (OUTPATIENT)
Dept: WOUND CARE | Facility: HOSPITAL | Age: 60
End: 2020-08-06

## 2020-08-20 ENCOUNTER — OFFICE VISIT (OUTPATIENT)
Dept: WOUND CARE | Facility: HOSPITAL | Age: 60
End: 2020-08-20

## 2020-09-03 ENCOUNTER — APPOINTMENT (OUTPATIENT)
Dept: WOUND CARE | Facility: HOSPITAL | Age: 60
End: 2020-09-03

## 2020-09-10 ENCOUNTER — OFFICE VISIT (OUTPATIENT)
Dept: WOUND CARE | Facility: HOSPITAL | Age: 60
End: 2020-09-10

## 2020-10-01 ENCOUNTER — APPOINTMENT (OUTPATIENT)
Dept: WOUND CARE | Facility: HOSPITAL | Age: 60
End: 2020-10-01

## 2020-10-08 ENCOUNTER — OFFICE VISIT (OUTPATIENT)
Dept: WOUND CARE | Facility: HOSPITAL | Age: 60
End: 2020-10-08

## 2020-10-22 ENCOUNTER — OFFICE VISIT (OUTPATIENT)
Dept: WOUND CARE | Facility: HOSPITAL | Age: 60
End: 2020-10-22

## 2020-11-12 ENCOUNTER — OFFICE VISIT (OUTPATIENT)
Dept: WOUND CARE | Facility: HOSPITAL | Age: 60
End: 2020-11-12

## 2020-11-25 ENCOUNTER — APPOINTMENT (OUTPATIENT)
Dept: CT IMAGING | Facility: HOSPITAL | Age: 60
End: 2020-11-25

## 2020-11-25 ENCOUNTER — APPOINTMENT (OUTPATIENT)
Dept: GENERAL RADIOLOGY | Facility: HOSPITAL | Age: 60
End: 2020-11-25

## 2020-11-25 ENCOUNTER — HOSPITAL ENCOUNTER (INPATIENT)
Facility: HOSPITAL | Age: 60
LOS: 3 days | Discharge: HOME | End: 2020-12-01
Attending: STUDENT IN AN ORGANIZED HEALTH CARE EDUCATION/TRAINING PROGRAM | Admitting: INTERNAL MEDICINE

## 2020-11-25 DIAGNOSIS — L02.91 ABSCESS: ICD-10-CM

## 2020-11-25 DIAGNOSIS — Z20.822 COVID-19 RULED OUT: ICD-10-CM

## 2020-11-25 DIAGNOSIS — L97.519 DIABETIC ULCER OF TOE OF RIGHT FOOT ASSOCIATED WITH TYPE 2 DIABETES MELLITUS, UNSPECIFIED ULCER STAGE (HCC): ICD-10-CM

## 2020-11-25 DIAGNOSIS — E11.610 CHARCOT FOOT DUE TO DIABETES MELLITUS (HCC): Chronic | ICD-10-CM

## 2020-11-25 DIAGNOSIS — R50.9 FEVER, UNSPECIFIED FEVER CAUSE: ICD-10-CM

## 2020-11-25 DIAGNOSIS — E11.621 DIABETIC ULCER OF TOE OF RIGHT FOOT ASSOCIATED WITH TYPE 2 DIABETES MELLITUS, UNSPECIFIED ULCER STAGE (HCC): ICD-10-CM

## 2020-11-25 DIAGNOSIS — L03.115 CELLULITIS OF RIGHT FOOT: Primary | ICD-10-CM

## 2020-11-25 DIAGNOSIS — R06.00 DYSPNEA, UNSPECIFIED TYPE: ICD-10-CM

## 2020-11-25 LAB
ALBUMIN SERPL-MCNC: 3.9 G/DL (ref 3.5–5.2)
ALBUMIN/GLOB SERPL: 1.2 G/DL
ALP SERPL-CCNC: 63 U/L (ref 39–117)
ALT SERPL W P-5'-P-CCNC: 27 U/L (ref 1–41)
ANION GAP SERPL CALCULATED.3IONS-SCNC: 14 MMOL/L (ref 5–15)
AST SERPL-CCNC: 28 U/L (ref 1–40)
BACTERIA UR QL AUTO: ABNORMAL /HPF
BASOPHILS # BLD AUTO: 0.1 10*3/MM3 (ref 0–0.2)
BASOPHILS NFR BLD AUTO: 0.6 % (ref 0–1.5)
BILIRUB SERPL-MCNC: 1 MG/DL (ref 0–1.2)
BILIRUB UR QL STRIP: NEGATIVE
BUN SERPL-MCNC: 13 MG/DL (ref 6–20)
BUN/CREAT SERPL: 10.7 (ref 7–25)
CALCIUM SPEC-SCNC: 9.2 MG/DL (ref 8.6–10.5)
CHLORIDE SERPL-SCNC: 97 MMOL/L (ref 98–107)
CLARITY UR: CLEAR
CO2 SERPL-SCNC: 24 MMOL/L (ref 22–29)
COLOR UR: ABNORMAL
CREAT BLDA-MCNC: 1.3 MG/DL (ref 0.6–1.3)
CREAT SERPL-MCNC: 1.22 MG/DL (ref 0.76–1.27)
CRP SERPL-MCNC: 24.35 MG/DL (ref 0–0.5)
D DIMER PPP FEU-MCNC: 1.64 MG/L (FEU) (ref 0–0.59)
D-LACTATE SERPL-SCNC: 1.6 MMOL/L (ref 0.5–2)
DEPRECATED RDW RBC AUTO: 43.3 FL (ref 37–54)
EOSINOPHIL # BLD AUTO: 0 10*3/MM3 (ref 0–0.4)
EOSINOPHIL NFR BLD AUTO: 0.3 % (ref 0.3–6.2)
ERYTHROCYTE [DISTWIDTH] IN BLOOD BY AUTOMATED COUNT: 13.9 % (ref 12.3–15.4)
ERYTHROCYTE [SEDIMENTATION RATE] IN BLOOD: 81 MM/HR (ref 0–20)
GFR SERPL CREATININE-BSD FRML MDRD: 61 ML/MIN/1.73
GLOBULIN UR ELPH-MCNC: 3.3 GM/DL
GLUCOSE BLDC GLUCOMTR-MCNC: 169 MG/DL (ref 70–105)
GLUCOSE SERPL-MCNC: 147 MG/DL (ref 65–99)
GLUCOSE UR STRIP-MCNC: NEGATIVE MG/DL
HCT VFR BLD AUTO: 39.7 % (ref 37.5–51)
HGB BLD-MCNC: 13.3 G/DL (ref 13–17.7)
HGB UR QL STRIP.AUTO: ABNORMAL
HOLD SPECIMEN: NORMAL
HOLD SPECIMEN: NORMAL
HYALINE CASTS UR QL AUTO: ABNORMAL /LPF
KETONES UR QL STRIP: ABNORMAL
LEUKOCYTE ESTERASE UR QL STRIP.AUTO: NEGATIVE
LYMPHOCYTES # BLD AUTO: 0.9 10*3/MM3 (ref 0.7–3.1)
LYMPHOCYTES NFR BLD AUTO: 7.7 % (ref 19.6–45.3)
MCH RBC QN AUTO: 29.7 PG (ref 26.6–33)
MCHC RBC AUTO-ENTMCNC: 33.5 G/DL (ref 31.5–35.7)
MCV RBC AUTO: 88.6 FL (ref 79–97)
MONOCYTES # BLD AUTO: 1.2 10*3/MM3 (ref 0.1–0.9)
MONOCYTES NFR BLD AUTO: 10.2 % (ref 5–12)
NEUTROPHILS NFR BLD AUTO: 81.2 % (ref 42.7–76)
NEUTROPHILS NFR BLD AUTO: 9.4 10*3/MM3 (ref 1.7–7)
NITRITE UR QL STRIP: NEGATIVE
NRBC BLD AUTO-RTO: 0 /100 WBC (ref 0–0.2)
PH UR STRIP.AUTO: 5.5 [PH] (ref 5–8)
PLATELET # BLD AUTO: 193 10*3/MM3 (ref 140–450)
PMV BLD AUTO: 8.3 FL (ref 6–12)
POTASSIUM SERPL-SCNC: 3.8 MMOL/L (ref 3.5–5.2)
PROT SERPL-MCNC: 7.2 G/DL (ref 6–8.5)
PROT UR QL STRIP: ABNORMAL
QT INTERVAL: 324 MS
RBC # BLD AUTO: 4.48 10*6/MM3 (ref 4.14–5.8)
RBC # UR: ABNORMAL /HPF
REF LAB TEST METHOD: ABNORMAL
SARS-COV-2 RNA PNL SPEC NAA+PROBE: NOT DETECTED
SODIUM SERPL-SCNC: 135 MMOL/L (ref 136–145)
SP GR UR STRIP: 1.02 (ref 1–1.03)
SQUAMOUS #/AREA URNS HPF: ABNORMAL /HPF
TROPONIN T SERPL-MCNC: <0.01 NG/ML (ref 0–0.03)
UROBILINOGEN UR QL STRIP: ABNORMAL
WBC # BLD AUTO: 11.5 10*3/MM3 (ref 3.4–10.8)
WBC UR QL AUTO: ABNORMAL /HPF
WHOLE BLOOD HOLD SPECIMEN: NORMAL
WHOLE BLOOD HOLD SPECIMEN: NORMAL

## 2020-11-25 PROCEDURE — 99222 1ST HOSP IP/OBS MODERATE 55: CPT | Performed by: PHYSICIAN ASSISTANT

## 2020-11-25 PROCEDURE — G0378 HOSPITAL OBSERVATION PER HR: HCPCS

## 2020-11-25 PROCEDURE — 85379 FIBRIN DEGRADATION QUANT: CPT | Performed by: PHYSICIAN ASSISTANT

## 2020-11-25 PROCEDURE — 87040 BLOOD CULTURE FOR BACTERIA: CPT | Performed by: PHYSICIAN ASSISTANT

## 2020-11-25 PROCEDURE — 71045 X-RAY EXAM CHEST 1 VIEW: CPT

## 2020-11-25 PROCEDURE — 83605 ASSAY OF LACTIC ACID: CPT

## 2020-11-25 PROCEDURE — 87635 SARS-COV-2 COVID-19 AMP PRB: CPT | Performed by: PHYSICIAN ASSISTANT

## 2020-11-25 PROCEDURE — 25010000002 CEFEPIME PER 500 MG: Performed by: PHYSICIAN ASSISTANT

## 2020-11-25 PROCEDURE — 25010000002 VANCOMYCIN 10 G RECONSTITUTED SOLUTION: Performed by: PHYSICIAN ASSISTANT

## 2020-11-25 PROCEDURE — 81001 URINALYSIS AUTO W/SCOPE: CPT | Performed by: PHYSICIAN ASSISTANT

## 2020-11-25 PROCEDURE — 85651 RBC SED RATE NONAUTOMATED: CPT | Performed by: NURSE PRACTITIONER

## 2020-11-25 PROCEDURE — 93005 ELECTROCARDIOGRAM TRACING: CPT | Performed by: PHYSICIAN ASSISTANT

## 2020-11-25 PROCEDURE — 86140 C-REACTIVE PROTEIN: CPT | Performed by: NURSE PRACTITIONER

## 2020-11-25 PROCEDURE — 83036 HEMOGLOBIN GLYCOSYLATED A1C: CPT | Performed by: PHYSICIAN ASSISTANT

## 2020-11-25 PROCEDURE — 80053 COMPREHEN METABOLIC PANEL: CPT | Performed by: PHYSICIAN ASSISTANT

## 2020-11-25 PROCEDURE — 85610 PROTHROMBIN TIME: CPT | Performed by: PHYSICIAN ASSISTANT

## 2020-11-25 PROCEDURE — 87186 SC STD MICRODIL/AGAR DIL: CPT | Performed by: PHYSICIAN ASSISTANT

## 2020-11-25 PROCEDURE — 0 IOPAMIDOL PER 1 ML: Performed by: PHYSICIAN ASSISTANT

## 2020-11-25 PROCEDURE — 84484 ASSAY OF TROPONIN QUANT: CPT | Performed by: PHYSICIAN ASSISTANT

## 2020-11-25 PROCEDURE — 85730 THROMBOPLASTIN TIME PARTIAL: CPT | Performed by: PHYSICIAN ASSISTANT

## 2020-11-25 PROCEDURE — 73630 X-RAY EXAM OF FOOT: CPT

## 2020-11-25 PROCEDURE — 99285 EMERGENCY DEPT VISIT HI MDM: CPT

## 2020-11-25 PROCEDURE — 87147 CULTURE TYPE IMMUNOLOGIC: CPT | Performed by: PHYSICIAN ASSISTANT

## 2020-11-25 PROCEDURE — 85025 COMPLETE CBC W/AUTO DIFF WBC: CPT | Performed by: PHYSICIAN ASSISTANT

## 2020-11-25 PROCEDURE — 82565 ASSAY OF CREATININE: CPT

## 2020-11-25 PROCEDURE — 71275 CT ANGIOGRAPHY CHEST: CPT

## 2020-11-25 PROCEDURE — 82962 GLUCOSE BLOOD TEST: CPT

## 2020-11-25 RX ORDER — IBUPROFEN 400 MG/1
800 TABLET ORAL ONCE
Status: COMPLETED | OUTPATIENT
Start: 2020-11-25 | End: 2020-11-25

## 2020-11-25 RX ORDER — ACETAMINOPHEN 325 MG/1
650 TABLET ORAL EVERY 4 HOURS PRN
Status: DISCONTINUED | OUTPATIENT
Start: 2020-11-25 | End: 2020-12-01 | Stop reason: HOSPADM

## 2020-11-25 RX ORDER — ONDANSETRON 4 MG/1
4 TABLET, FILM COATED ORAL EVERY 6 HOURS PRN
Status: DISCONTINUED | OUTPATIENT
Start: 2020-11-25 | End: 2020-12-01 | Stop reason: HOSPADM

## 2020-11-25 RX ORDER — NICOTINE POLACRILEX 4 MG
15 LOZENGE BUCCAL
Status: DISCONTINUED | OUTPATIENT
Start: 2020-11-25 | End: 2020-12-01 | Stop reason: HOSPADM

## 2020-11-25 RX ORDER — PANTOPRAZOLE SODIUM 40 MG/1
40 TABLET, DELAYED RELEASE ORAL
Status: DISCONTINUED | OUTPATIENT
Start: 2020-11-26 | End: 2020-12-01 | Stop reason: HOSPADM

## 2020-11-25 RX ORDER — ACETAMINOPHEN 500 MG
1000 TABLET ORAL ONCE
Status: COMPLETED | OUTPATIENT
Start: 2020-11-25 | End: 2020-11-25

## 2020-11-25 RX ORDER — INSULIN GLARGINE 100 [IU]/ML
152 INJECTION, SOLUTION SUBCUTANEOUS EVERY MORNING
Status: DISCONTINUED | OUTPATIENT
Start: 2020-11-26 | End: 2020-12-01 | Stop reason: HOSPADM

## 2020-11-25 RX ORDER — LOSARTAN POTASSIUM 50 MG/1
100 TABLET ORAL DAILY
Status: DISCONTINUED | OUTPATIENT
Start: 2020-11-26 | End: 2020-12-01 | Stop reason: HOSPADM

## 2020-11-25 RX ORDER — SODIUM CHLORIDE 0.9 % (FLUSH) 0.9 %
10 SYRINGE (ML) INJECTION AS NEEDED
Status: DISCONTINUED | OUTPATIENT
Start: 2020-11-25 | End: 2020-12-01 | Stop reason: HOSPADM

## 2020-11-25 RX ORDER — INSULIN LISPRO 100 [IU]/ML
0-14 INJECTION, SOLUTION INTRAVENOUS; SUBCUTANEOUS
Status: DISCONTINUED | OUTPATIENT
Start: 2020-11-26 | End: 2020-12-01 | Stop reason: HOSPADM

## 2020-11-25 RX ORDER — ONDANSETRON 2 MG/ML
4 INJECTION INTRAMUSCULAR; INTRAVENOUS EVERY 6 HOURS PRN
Status: DISCONTINUED | OUTPATIENT
Start: 2020-11-25 | End: 2020-12-01 | Stop reason: HOSPADM

## 2020-11-25 RX ORDER — NITROGLYCERIN 0.4 MG/1
0.4 TABLET SUBLINGUAL
Status: DISCONTINUED | OUTPATIENT
Start: 2020-11-25 | End: 2020-12-01 | Stop reason: HOSPADM

## 2020-11-25 RX ORDER — ACETAMINOPHEN 160 MG/5ML
650 SOLUTION ORAL EVERY 4 HOURS PRN
Status: DISCONTINUED | OUTPATIENT
Start: 2020-11-25 | End: 2020-12-01 | Stop reason: HOSPADM

## 2020-11-25 RX ORDER — MAGNESIUM SULFATE 1 G/100ML
1 INJECTION INTRAVENOUS AS NEEDED
Status: DISCONTINUED | OUTPATIENT
Start: 2020-11-25 | End: 2020-12-01 | Stop reason: HOSPADM

## 2020-11-25 RX ORDER — SODIUM CHLORIDE 0.9 % (FLUSH) 0.9 %
10 SYRINGE (ML) INJECTION EVERY 12 HOURS SCHEDULED
Status: DISCONTINUED | OUTPATIENT
Start: 2020-11-25 | End: 2020-12-01 | Stop reason: HOSPADM

## 2020-11-25 RX ORDER — CHOLECALCIFEROL (VITAMIN D3) 125 MCG
5 CAPSULE ORAL NIGHTLY PRN
Status: DISCONTINUED | OUTPATIENT
Start: 2020-11-25 | End: 2020-12-01 | Stop reason: HOSPADM

## 2020-11-25 RX ORDER — INSULIN LISPRO 100 [IU]/ML
0-14 INJECTION, SOLUTION INTRAVENOUS; SUBCUTANEOUS AS NEEDED
Status: DISCONTINUED | OUTPATIENT
Start: 2020-11-25 | End: 2020-12-01 | Stop reason: HOSPADM

## 2020-11-25 RX ORDER — ACETAMINOPHEN 650 MG/1
650 SUPPOSITORY RECTAL EVERY 4 HOURS PRN
Status: DISCONTINUED | OUTPATIENT
Start: 2020-11-25 | End: 2020-12-01 | Stop reason: HOSPADM

## 2020-11-25 RX ORDER — FAMOTIDINE 20 MG/1
20 TABLET, FILM COATED ORAL DAILY
Status: DISCONTINUED | OUTPATIENT
Start: 2020-11-26 | End: 2020-12-01 | Stop reason: HOSPADM

## 2020-11-25 RX ORDER — POTASSIUM CHLORIDE 20 MEQ/1
40 TABLET, EXTENDED RELEASE ORAL AS NEEDED
Status: DISCONTINUED | OUTPATIENT
Start: 2020-11-25 | End: 2020-12-01 | Stop reason: HOSPADM

## 2020-11-25 RX ORDER — ALUMINA, MAGNESIA, AND SIMETHICONE 2400; 2400; 240 MG/30ML; MG/30ML; MG/30ML
15 SUSPENSION ORAL EVERY 6 HOURS PRN
Status: DISCONTINUED | OUTPATIENT
Start: 2020-11-25 | End: 2020-12-01 | Stop reason: HOSPADM

## 2020-11-25 RX ORDER — DEXTROSE MONOHYDRATE 25 G/50ML
25 INJECTION, SOLUTION INTRAVENOUS
Status: DISCONTINUED | OUTPATIENT
Start: 2020-11-25 | End: 2020-12-01 | Stop reason: HOSPADM

## 2020-11-25 RX ORDER — MAGNESIUM SULFATE HEPTAHYDRATE 40 MG/ML
2 INJECTION, SOLUTION INTRAVENOUS AS NEEDED
Status: DISCONTINUED | OUTPATIENT
Start: 2020-11-25 | End: 2020-12-01 | Stop reason: HOSPADM

## 2020-11-25 RX ORDER — METOPROLOL SUCCINATE 50 MG/1
50 TABLET, EXTENDED RELEASE ORAL DAILY
Status: DISCONTINUED | OUTPATIENT
Start: 2020-11-26 | End: 2020-12-01 | Stop reason: HOSPADM

## 2020-11-25 RX ORDER — ATORVASTATIN CALCIUM 40 MG/1
40 TABLET, FILM COATED ORAL DAILY
Status: DISCONTINUED | OUTPATIENT
Start: 2020-11-26 | End: 2020-12-01 | Stop reason: HOSPADM

## 2020-11-25 RX ADMIN — IOPAMIDOL 100 ML: 755 INJECTION, SOLUTION INTRAVENOUS at 18:50

## 2020-11-25 RX ADMIN — IBUPROFEN 800 MG: 400 TABLET, FILM COATED ORAL at 18:55

## 2020-11-25 RX ADMIN — Medication 10 ML: at 22:55

## 2020-11-25 RX ADMIN — VANCOMYCIN HYDROCHLORIDE 2000 MG: 100 INJECTION, POWDER, LYOPHILIZED, FOR SOLUTION INTRAVENOUS at 17:17

## 2020-11-25 RX ADMIN — CEFEPIME HYDROCHLORIDE 2 G: 2 INJECTION, POWDER, FOR SOLUTION INTRAVENOUS at 17:18

## 2020-11-25 RX ADMIN — ACETAMINOPHEN 1000 MG: 500 TABLET, FILM COATED ORAL at 16:05

## 2020-11-26 ENCOUNTER — APPOINTMENT (OUTPATIENT)
Dept: MRI IMAGING | Facility: HOSPITAL | Age: 60
End: 2020-11-26

## 2020-11-26 ENCOUNTER — APPOINTMENT (OUTPATIENT)
Dept: CARDIOLOGY | Facility: HOSPITAL | Age: 60
End: 2020-11-26

## 2020-11-26 PROBLEM — E66.9 OBESITY (BMI 30-39.9): Chronic | Status: ACTIVE | Noted: 2019-11-18

## 2020-11-26 PROBLEM — K76.0 NAFLD (NONALCOHOLIC FATTY LIVER DISEASE): Status: ACTIVE | Noted: 2017-09-20

## 2020-11-26 PROBLEM — E11.9 DIABETES MELLITUS (HCC): Chronic | Status: ACTIVE | Noted: 2019-11-15

## 2020-11-26 PROBLEM — K76.0 NAFLD (NONALCOHOLIC FATTY LIVER DISEASE): Chronic | Status: ACTIVE | Noted: 2017-09-20

## 2020-11-26 PROBLEM — E11.319 DIABETIC RETINOPATHY (HCC): Status: ACTIVE | Noted: 2020-11-26

## 2020-11-26 PROBLEM — R79.89 ELEVATED D-DIMER: Status: ACTIVE | Noted: 2020-11-26

## 2020-11-26 PROBLEM — IMO0002 DM (DIABETES MELLITUS), TYPE 2, UNCONTROLLED: Status: ACTIVE | Noted: 2020-11-26

## 2020-11-26 PROBLEM — I10 HYPERTENSION: Chronic | Status: ACTIVE | Noted: 2019-11-15

## 2020-11-26 PROBLEM — K21.9 GERD (GASTROESOPHAGEAL REFLUX DISEASE): Chronic | Status: ACTIVE | Noted: 2019-11-15

## 2020-11-26 PROBLEM — E78.5 HYPERLIPIDEMIA: Chronic | Status: ACTIVE | Noted: 2019-11-15

## 2020-11-26 PROBLEM — N52.9 ERECTILE DYSFUNCTION: Status: ACTIVE | Noted: 2020-11-26

## 2020-11-26 PROBLEM — E11.610 CHARCOT FOOT DUE TO DIABETES MELLITUS: Chronic | Status: ACTIVE | Noted: 2019-11-15

## 2020-11-26 LAB
ANION GAP SERPL CALCULATED.3IONS-SCNC: 12 MMOL/L (ref 5–15)
APTT PPP: 31.7 SECONDS (ref 24–31)
BASOPHILS # BLD AUTO: 0.1 10*3/MM3 (ref 0–0.2)
BASOPHILS NFR BLD AUTO: 0.8 % (ref 0–1.5)
BH CV LOW VAS RIGHT GREATER SAPH AK VESSEL: 1
BH CV LOWER VASCULAR LEFT COMMON FEMORAL AUGMENT: NORMAL
BH CV LOWER VASCULAR LEFT COMMON FEMORAL COMPETENT: NORMAL
BH CV LOWER VASCULAR LEFT COMMON FEMORAL COMPRESS: NORMAL
BH CV LOWER VASCULAR LEFT COMMON FEMORAL PHASIC: NORMAL
BH CV LOWER VASCULAR LEFT COMMON FEMORAL SPONT: NORMAL
BH CV LOWER VASCULAR LEFT DISTAL FEMORAL COMPRESS: NORMAL
BH CV LOWER VASCULAR LEFT GASTRONEMIUS COMPRESS: NORMAL
BH CV LOWER VASCULAR LEFT GREATER SAPH AK COMPRESS: NORMAL
BH CV LOWER VASCULAR LEFT GREATER SAPH BK COMPRESS: NORMAL
BH CV LOWER VASCULAR LEFT LESSER SAPH COMPRESS: NORMAL
BH CV LOWER VASCULAR LEFT MID FEMORAL AUGMENT: NORMAL
BH CV LOWER VASCULAR LEFT MID FEMORAL COMPETENT: NORMAL
BH CV LOWER VASCULAR LEFT MID FEMORAL COMPRESS: NORMAL
BH CV LOWER VASCULAR LEFT MID FEMORAL PHASIC: NORMAL
BH CV LOWER VASCULAR LEFT MID FEMORAL SPONT: NORMAL
BH CV LOWER VASCULAR LEFT PERONEAL COMPRESS: NORMAL
BH CV LOWER VASCULAR LEFT POPLITEAL AUGMENT: NORMAL
BH CV LOWER VASCULAR LEFT POPLITEAL COMPETENT: NORMAL
BH CV LOWER VASCULAR LEFT POPLITEAL COMPRESS: NORMAL
BH CV LOWER VASCULAR LEFT POPLITEAL PHASIC: NORMAL
BH CV LOWER VASCULAR LEFT POPLITEAL SPONT: NORMAL
BH CV LOWER VASCULAR LEFT POSTERIOR TIBIAL COMPRESS: NORMAL
BH CV LOWER VASCULAR LEFT PROXIMAL FEMORAL COMPRESS: NORMAL
BH CV LOWER VASCULAR LEFT SAPHENOFEMORAL JUNCTION COMPRESS: NORMAL
BH CV LOWER VASCULAR RIGHT COMMON FEMORAL AUGMENT: NORMAL
BH CV LOWER VASCULAR RIGHT COMMON FEMORAL COMPETENT: NORMAL
BH CV LOWER VASCULAR RIGHT COMMON FEMORAL COMPRESS: NORMAL
BH CV LOWER VASCULAR RIGHT COMMON FEMORAL PHASIC: NORMAL
BH CV LOWER VASCULAR RIGHT COMMON FEMORAL SPONT: NORMAL
BH CV LOWER VASCULAR RIGHT DISTAL FEMORAL COMPRESS: NORMAL
BH CV LOWER VASCULAR RIGHT GASTRONEMIUS COMPRESS: NORMAL
BH CV LOWER VASCULAR RIGHT GREATER SAPH AK COMPRESS: NORMAL
BH CV LOWER VASCULAR RIGHT GREATER SAPH AK THROMBUS: NORMAL
BH CV LOWER VASCULAR RIGHT GREATER SAPH BK COMPRESS: NORMAL
BH CV LOWER VASCULAR RIGHT LESSER SAPH COMPRESS: NORMAL
BH CV LOWER VASCULAR RIGHT MID FEMORAL AUGMENT: NORMAL
BH CV LOWER VASCULAR RIGHT MID FEMORAL COMPETENT: NORMAL
BH CV LOWER VASCULAR RIGHT MID FEMORAL COMPRESS: NORMAL
BH CV LOWER VASCULAR RIGHT MID FEMORAL PHASIC: NORMAL
BH CV LOWER VASCULAR RIGHT MID FEMORAL SPONT: NORMAL
BH CV LOWER VASCULAR RIGHT POPLITEAL AUGMENT: NORMAL
BH CV LOWER VASCULAR RIGHT POPLITEAL COMPETENT: NORMAL
BH CV LOWER VASCULAR RIGHT POPLITEAL COMPRESS: NORMAL
BH CV LOWER VASCULAR RIGHT POPLITEAL PHASIC: NORMAL
BH CV LOWER VASCULAR RIGHT POPLITEAL SPONT: NORMAL
BH CV LOWER VASCULAR RIGHT POSTERIOR TIBIAL COMPRESS: NORMAL
BH CV LOWER VASCULAR RIGHT PROXIMAL FEMORAL COMPRESS: NORMAL
BH CV LOWER VASCULAR RIGHT SAPHENOFEMORAL JUNCTION COMPRESS: NORMAL
BUN SERPL-MCNC: 14 MG/DL (ref 6–20)
BUN/CREAT SERPL: 12.7 (ref 7–25)
CALCIUM SPEC-SCNC: 8.6 MG/DL (ref 8.6–10.5)
CHLORIDE SERPL-SCNC: 102 MMOL/L (ref 98–107)
CO2 SERPL-SCNC: 24 MMOL/L (ref 22–29)
CREAT SERPL-MCNC: 1.1 MG/DL (ref 0.76–1.27)
DEPRECATED RDW RBC AUTO: 42 FL (ref 37–54)
EOSINOPHIL # BLD AUTO: 0.4 10*3/MM3 (ref 0–0.4)
EOSINOPHIL NFR BLD AUTO: 3.5 % (ref 0.3–6.2)
ERYTHROCYTE [DISTWIDTH] IN BLOOD BY AUTOMATED COUNT: 13.6 % (ref 12.3–15.4)
GFR SERPL CREATININE-BSD FRML MDRD: 69 ML/MIN/1.73
GLUCOSE BLDC GLUCOMTR-MCNC: 118 MG/DL (ref 70–105)
GLUCOSE BLDC GLUCOMTR-MCNC: 142 MG/DL (ref 70–105)
GLUCOSE BLDC GLUCOMTR-MCNC: 173 MG/DL (ref 70–105)
GLUCOSE BLDC GLUCOMTR-MCNC: 255 MG/DL (ref 70–105)
GLUCOSE SERPL-MCNC: 180 MG/DL (ref 65–99)
HCT VFR BLD AUTO: 36.3 % (ref 37.5–51)
HGB BLD-MCNC: 12.2 G/DL (ref 13–17.7)
INR PPP: 1.15 (ref 0.93–1.1)
LYMPHOCYTES # BLD AUTO: 0.9 10*3/MM3 (ref 0.7–3.1)
LYMPHOCYTES NFR BLD AUTO: 8.8 % (ref 19.6–45.3)
MAGNESIUM SERPL-MCNC: 1.8 MG/DL (ref 1.6–2.6)
MCH RBC QN AUTO: 30.1 PG (ref 26.6–33)
MCHC RBC AUTO-ENTMCNC: 33.7 G/DL (ref 31.5–35.7)
MCV RBC AUTO: 89.1 FL (ref 79–97)
MONOCYTES # BLD AUTO: 1.1 10*3/MM3 (ref 0.1–0.9)
MONOCYTES NFR BLD AUTO: 10.4 % (ref 5–12)
NEUTROPHILS NFR BLD AUTO: 7.8 10*3/MM3 (ref 1.7–7)
NEUTROPHILS NFR BLD AUTO: 76.5 % (ref 42.7–76)
NRBC BLD AUTO-RTO: 0.1 /100 WBC (ref 0–0.2)
PLATELET # BLD AUTO: 160 10*3/MM3 (ref 140–450)
PMV BLD AUTO: 8.9 FL (ref 6–12)
POTASSIUM SERPL-SCNC: 3.3 MMOL/L (ref 3.5–5.2)
PROTHROMBIN TIME: 12.6 SECONDS (ref 9.6–11.7)
RBC # BLD AUTO: 4.07 10*6/MM3 (ref 4.14–5.8)
SODIUM SERPL-SCNC: 138 MMOL/L (ref 136–145)
VANCOMYCIN PEAK SERPL-MCNC: 29.3 MCG/ML (ref 20–40)
VANCOMYCIN TROUGH SERPL-MCNC: 11 MCG/ML (ref 5–20)
WBC # BLD AUTO: 10.2 10*3/MM3 (ref 3.4–10.8)

## 2020-11-26 PROCEDURE — 93010 ELECTROCARDIOGRAM REPORT: CPT | Performed by: INTERNAL MEDICINE

## 2020-11-26 PROCEDURE — G0378 HOSPITAL OBSERVATION PER HR: HCPCS

## 2020-11-26 PROCEDURE — 25010000002 VANCOMYCIN 10 G RECONSTITUTED SOLUTION: Performed by: PHYSICIAN ASSISTANT

## 2020-11-26 PROCEDURE — 99253 IP/OBS CNSLTJ NEW/EST LOW 45: CPT | Performed by: PODIATRIST

## 2020-11-26 PROCEDURE — A9579 GAD-BASE MR CONTRAST NOS,1ML: HCPCS | Performed by: INTERNAL MEDICINE

## 2020-11-26 PROCEDURE — 99232 SBSQ HOSP IP/OBS MODERATE 35: CPT | Performed by: INTERNAL MEDICINE

## 2020-11-26 PROCEDURE — 80202 ASSAY OF VANCOMYCIN: CPT | Performed by: PHYSICIAN ASSISTANT

## 2020-11-26 PROCEDURE — 25010000002 GADOTERIDOL PER 1 ML: Performed by: INTERNAL MEDICINE

## 2020-11-26 PROCEDURE — 82962 GLUCOSE BLOOD TEST: CPT

## 2020-11-26 PROCEDURE — 80048 BASIC METABOLIC PNL TOTAL CA: CPT | Performed by: PHYSICIAN ASSISTANT

## 2020-11-26 PROCEDURE — 25010000002 CEFEPIME PER 500 MG: Performed by: INTERNAL MEDICINE

## 2020-11-26 PROCEDURE — 93005 ELECTROCARDIOGRAM TRACING: CPT | Performed by: INTERNAL MEDICINE

## 2020-11-26 PROCEDURE — 73720 MRI LWR EXTREMITY W/O&W/DYE: CPT

## 2020-11-26 PROCEDURE — 83735 ASSAY OF MAGNESIUM: CPT | Performed by: PHYSICIAN ASSISTANT

## 2020-11-26 PROCEDURE — 85025 COMPLETE CBC W/AUTO DIFF WBC: CPT | Performed by: PHYSICIAN ASSISTANT

## 2020-11-26 PROCEDURE — 63710000001 INSULIN LISPRO (HUMAN) PER 5 UNITS: Performed by: PHYSICIAN ASSISTANT

## 2020-11-26 PROCEDURE — 93970 EXTREMITY STUDY: CPT

## 2020-11-26 RX ADMIN — VANCOMYCIN HYDROCHLORIDE 1500 MG: 100 INJECTION, POWDER, LYOPHILIZED, FOR SOLUTION INTRAVENOUS at 05:21

## 2020-11-26 RX ADMIN — Medication: at 05:04

## 2020-11-26 RX ADMIN — ACETAMINOPHEN 650 MG: 325 TABLET, FILM COATED ORAL at 08:12

## 2020-11-26 RX ADMIN — Medication 10 ML: at 13:00

## 2020-11-26 RX ADMIN — GADOTERIDOL 20 ML: 279.3 INJECTION, SOLUTION INTRAVENOUS at 15:05

## 2020-11-26 RX ADMIN — ACETAMINOPHEN 650 MG: 325 TABLET, FILM COATED ORAL at 19:38

## 2020-11-26 RX ADMIN — CEFEPIME HYDROCHLORIDE 2 G: 2 INJECTION, POWDER, FOR SOLUTION INTRAVENOUS at 20:50

## 2020-11-26 RX ADMIN — VANCOMYCIN HYDROCHLORIDE 1500 MG: 100 INJECTION, POWDER, LYOPHILIZED, FOR SOLUTION INTRAVENOUS at 18:24

## 2020-11-26 RX ADMIN — Medication: at 22:10

## 2020-11-26 RX ADMIN — METOPROLOL SUCCINATE 50 MG: 50 TABLET, EXTENDED RELEASE ORAL at 16:53

## 2020-11-26 RX ADMIN — LOSARTAN POTASSIUM 100 MG: 50 TABLET, FILM COATED ORAL at 16:52

## 2020-11-26 RX ADMIN — INSULIN LISPRO 3 UNITS: 100 INJECTION, SOLUTION INTRAVENOUS; SUBCUTANEOUS at 16:51

## 2020-11-27 ENCOUNTER — ANESTHESIA (OUTPATIENT)
Dept: PERIOP | Facility: HOSPITAL | Age: 60
End: 2020-11-27

## 2020-11-27 ENCOUNTER — ANESTHESIA EVENT (OUTPATIENT)
Dept: PERIOP | Facility: HOSPITAL | Age: 60
End: 2020-11-27

## 2020-11-27 LAB
ANION GAP SERPL CALCULATED.3IONS-SCNC: 11 MMOL/L (ref 5–15)
BASOPHILS # BLD AUTO: 0.1 10*3/MM3 (ref 0–0.2)
BASOPHILS NFR BLD AUTO: 0.5 % (ref 0–1.5)
BUN SERPL-MCNC: 20 MG/DL (ref 6–20)
BUN/CREAT SERPL: 13.6 (ref 7–25)
CALCIUM SPEC-SCNC: 8.3 MG/DL (ref 8.6–10.5)
CHLORIDE SERPL-SCNC: 103 MMOL/L (ref 98–107)
CO2 SERPL-SCNC: 23 MMOL/L (ref 22–29)
CREAT SERPL-MCNC: 1.47 MG/DL (ref 0.76–1.27)
DEPRECATED RDW RBC AUTO: 41.6 FL (ref 37–54)
EOSINOPHIL # BLD AUTO: 0.3 10*3/MM3 (ref 0–0.4)
EOSINOPHIL NFR BLD AUTO: 3.4 % (ref 0.3–6.2)
ERYTHROCYTE [DISTWIDTH] IN BLOOD BY AUTOMATED COUNT: 13.5 % (ref 12.3–15.4)
GFR SERPL CREATININE-BSD FRML MDRD: 49 ML/MIN/1.73
GLUCOSE BLDC GLUCOMTR-MCNC: 154 MG/DL (ref 70–105)
GLUCOSE BLDC GLUCOMTR-MCNC: 161 MG/DL (ref 70–105)
GLUCOSE BLDC GLUCOMTR-MCNC: 222 MG/DL (ref 70–105)
GLUCOSE BLDC GLUCOMTR-MCNC: 294 MG/DL (ref 70–105)
GLUCOSE SERPL-MCNC: 155 MG/DL (ref 65–99)
HBA1C MFR BLD: 7.4 % (ref 3.5–5.6)
HCT VFR BLD AUTO: 33.4 % (ref 37.5–51)
HGB BLD-MCNC: 11.2 G/DL (ref 13–17.7)
LYMPHOCYTES # BLD AUTO: 1.2 10*3/MM3 (ref 0.7–3.1)
LYMPHOCYTES NFR BLD AUTO: 13.3 % (ref 19.6–45.3)
MAGNESIUM SERPL-MCNC: 1.8 MG/DL (ref 1.6–2.6)
MCH RBC QN AUTO: 29.7 PG (ref 26.6–33)
MCHC RBC AUTO-ENTMCNC: 33.6 G/DL (ref 31.5–35.7)
MCV RBC AUTO: 88.3 FL (ref 79–97)
MONOCYTES # BLD AUTO: 1.1 10*3/MM3 (ref 0.1–0.9)
MONOCYTES NFR BLD AUTO: 12 % (ref 5–12)
NEUTROPHILS NFR BLD AUTO: 6.7 10*3/MM3 (ref 1.7–7)
NEUTROPHILS NFR BLD AUTO: 70.8 % (ref 42.7–76)
NRBC BLD AUTO-RTO: 0 /100 WBC (ref 0–0.2)
PLATELET # BLD AUTO: 182 10*3/MM3 (ref 140–450)
PMV BLD AUTO: 8.6 FL (ref 6–12)
POTASSIUM SERPL-SCNC: 3.4 MMOL/L (ref 3.5–5.2)
QT INTERVAL: 326 MS
RBC # BLD AUTO: 3.78 10*6/MM3 (ref 4.14–5.8)
SODIUM SERPL-SCNC: 137 MMOL/L (ref 136–145)
VANCOMYCIN TROUGH SERPL-MCNC: 13.5 MCG/ML (ref 5–20)
WBC # BLD AUTO: 9.4 10*3/MM3 (ref 3.4–10.8)

## 2020-11-27 PROCEDURE — G0378 HOSPITAL OBSERVATION PER HR: HCPCS

## 2020-11-27 PROCEDURE — 0J9Q0ZX DRAINAGE OF RIGHT FOOT SUBCUTANEOUS TISSUE AND FASCIA, OPEN APPROACH, DIAGNOSTIC: ICD-10-PCS | Performed by: PODIATRIST

## 2020-11-27 PROCEDURE — 25010000002 FENTANYL CITRATE (PF) 100 MCG/2ML SOLUTION: Performed by: ANESTHESIOLOGY

## 2020-11-27 PROCEDURE — 25010000002 MIDAZOLAM PER 1 MG: Performed by: ANESTHESIOLOGY

## 2020-11-27 PROCEDURE — 20240 BONE BIOPSY OPEN SUPERFICIAL: CPT | Performed by: PODIATRIST

## 2020-11-27 PROCEDURE — 63710000001 INSULIN LISPRO (HUMAN) PER 5 UNITS: Performed by: PODIATRIST

## 2020-11-27 PROCEDURE — 87147 CULTURE TYPE IMMUNOLOGIC: CPT | Performed by: PODIATRIST

## 2020-11-27 PROCEDURE — 99232 SBSQ HOSP IP/OBS MODERATE 35: CPT | Performed by: INTERNAL MEDICINE

## 2020-11-27 PROCEDURE — 25010000002 CEFEPIME PER 500 MG: Performed by: INTERNAL MEDICINE

## 2020-11-27 PROCEDURE — 25010000002 METOCLOPRAMIDE PER 10 MG: Performed by: ANESTHESIOLOGY

## 2020-11-27 PROCEDURE — 25010000002 VANCOMYCIN 1 G RECONSTITUTED SOLUTION 1 EACH VIAL: Performed by: PODIATRIST

## 2020-11-27 PROCEDURE — 0QBL0ZX EXCISION OF RIGHT TARSAL, OPEN APPROACH, DIAGNOSTIC: ICD-10-PCS | Performed by: PODIATRIST

## 2020-11-27 PROCEDURE — 87205 SMEAR GRAM STAIN: CPT | Performed by: PODIATRIST

## 2020-11-27 PROCEDURE — 25010000002 CEFEPIME PER 500 MG: Performed by: PODIATRIST

## 2020-11-27 PROCEDURE — 82962 GLUCOSE BLOOD TEST: CPT

## 2020-11-27 PROCEDURE — 88311 DECALCIFY TISSUE: CPT | Performed by: PODIATRIST

## 2020-11-27 PROCEDURE — 25010000002 ONDANSETRON PER 1 MG: Performed by: ANESTHESIOLOGY

## 2020-11-27 PROCEDURE — 25010000002 HYDROMORPHONE PER 4 MG: Performed by: ANESTHESIOLOGY

## 2020-11-27 PROCEDURE — 87075 CULTR BACTERIA EXCEPT BLOOD: CPT | Performed by: PODIATRIST

## 2020-11-27 PROCEDURE — 83735 ASSAY OF MAGNESIUM: CPT | Performed by: PHYSICIAN ASSISTANT

## 2020-11-27 PROCEDURE — 80048 BASIC METABOLIC PNL TOTAL CA: CPT | Performed by: INTERNAL MEDICINE

## 2020-11-27 PROCEDURE — 88307 TISSUE EXAM BY PATHOLOGIST: CPT | Performed by: PODIATRIST

## 2020-11-27 PROCEDURE — 80202 ASSAY OF VANCOMYCIN: CPT | Performed by: INTERNAL MEDICINE

## 2020-11-27 PROCEDURE — 28002 TREATMENT OF FOOT INFECTION: CPT | Performed by: PODIATRIST

## 2020-11-27 PROCEDURE — 25010000002 VANCOMYCIN 10 G RECONSTITUTED SOLUTION: Performed by: PHYSICIAN ASSISTANT

## 2020-11-27 PROCEDURE — 25010000002 PROPOFOL 10 MG/ML EMULSION: Performed by: ANESTHESIOLOGY

## 2020-11-27 PROCEDURE — 87070 CULTURE OTHR SPECIMN AEROBIC: CPT | Performed by: PODIATRIST

## 2020-11-27 PROCEDURE — 85025 COMPLETE CBC W/AUTO DIFF WBC: CPT | Performed by: INTERNAL MEDICINE

## 2020-11-27 PROCEDURE — 87186 SC STD MICRODIL/AGAR DIL: CPT | Performed by: PODIATRIST

## 2020-11-27 RX ORDER — METOCLOPRAMIDE HYDROCHLORIDE 5 MG/ML
INJECTION INTRAMUSCULAR; INTRAVENOUS AS NEEDED
Status: DISCONTINUED | OUTPATIENT
Start: 2020-11-27 | End: 2020-11-27 | Stop reason: SURG

## 2020-11-27 RX ORDER — ACETAMINOPHEN 650 MG
TABLET, EXTENDED RELEASE ORAL AS NEEDED
Status: DISCONTINUED | OUTPATIENT
Start: 2020-11-27 | End: 2020-11-27 | Stop reason: HOSPADM

## 2020-11-27 RX ORDER — SODIUM CHLORIDE 9 MG/ML
75 INJECTION, SOLUTION INTRAVENOUS CONTINUOUS
Status: DISCONTINUED | OUTPATIENT
Start: 2020-11-27 | End: 2020-12-01 | Stop reason: HOSPADM

## 2020-11-27 RX ORDER — LIDOCAINE HYDROCHLORIDE 20 MG/ML
INJECTION, SOLUTION EPIDURAL; INFILTRATION; INTRACAUDAL; PERINEURAL AS NEEDED
Status: DISCONTINUED | OUTPATIENT
Start: 2020-11-27 | End: 2020-11-27 | Stop reason: SURG

## 2020-11-27 RX ORDER — SODIUM CHLORIDE, SODIUM LACTATE, POTASSIUM CHLORIDE, CALCIUM CHLORIDE 600; 310; 30; 20 MG/100ML; MG/100ML; MG/100ML; MG/100ML
INJECTION, SOLUTION INTRAVENOUS CONTINUOUS PRN
Status: DISCONTINUED | OUTPATIENT
Start: 2020-11-27 | End: 2020-11-27 | Stop reason: SURG

## 2020-11-27 RX ORDER — MIDAZOLAM HYDROCHLORIDE 1 MG/ML
INJECTION INTRAMUSCULAR; INTRAVENOUS AS NEEDED
Status: DISCONTINUED | OUTPATIENT
Start: 2020-11-27 | End: 2020-11-27 | Stop reason: SURG

## 2020-11-27 RX ORDER — HYDROMORPHONE HCL 110MG/55ML
PATIENT CONTROLLED ANALGESIA SYRINGE INTRAVENOUS AS NEEDED
Status: DISCONTINUED | OUTPATIENT
Start: 2020-11-27 | End: 2020-11-27 | Stop reason: SURG

## 2020-11-27 RX ORDER — FENTANYL CITRATE 50 UG/ML
INJECTION, SOLUTION INTRAMUSCULAR; INTRAVENOUS AS NEEDED
Status: DISCONTINUED | OUTPATIENT
Start: 2020-11-27 | End: 2020-11-27 | Stop reason: SURG

## 2020-11-27 RX ORDER — ONDANSETRON 2 MG/ML
INJECTION INTRAMUSCULAR; INTRAVENOUS AS NEEDED
Status: DISCONTINUED | OUTPATIENT
Start: 2020-11-27 | End: 2020-11-27 | Stop reason: SURG

## 2020-11-27 RX ORDER — PROPOFOL 10 MG/ML
VIAL (ML) INTRAVENOUS AS NEEDED
Status: DISCONTINUED | OUTPATIENT
Start: 2020-11-27 | End: 2020-11-27 | Stop reason: SURG

## 2020-11-27 RX ADMIN — FENTANYL CITRATE 100 MCG: 50 INJECTION, SOLUTION INTRAMUSCULAR; INTRAVENOUS at 10:55

## 2020-11-27 RX ADMIN — CEFEPIME HYDROCHLORIDE 2 G: 2 INJECTION, POWDER, FOR SOLUTION INTRAVENOUS at 13:56

## 2020-11-27 RX ADMIN — CEFEPIME HYDROCHLORIDE 2 G: 2 INJECTION, POWDER, FOR SOLUTION INTRAVENOUS at 03:44

## 2020-11-27 RX ADMIN — LIDOCAINE HYDROCHLORIDE 200 MG: 20 INJECTION, SOLUTION EPIDURAL; INFILTRATION; INTRACAUDAL; PERINEURAL at 10:54

## 2020-11-27 RX ADMIN — PROPOFOL 200 MG: 10 INJECTION, EMULSION INTRAVENOUS at 10:55

## 2020-11-27 RX ADMIN — Medication 10 ML: at 20:44

## 2020-11-27 RX ADMIN — SODIUM CHLORIDE, SODIUM LACTATE, POTASSIUM CHLORIDE, AND CALCIUM CHLORIDE: .6; .31; .03; .02 INJECTION, SOLUTION INTRAVENOUS at 10:51

## 2020-11-27 RX ADMIN — INSULIN LISPRO 5 UNITS: 100 INJECTION, SOLUTION INTRAVENOUS; SUBCUTANEOUS at 17:39

## 2020-11-27 RX ADMIN — ACETAMINOPHEN 650 MG: 325 TABLET, FILM COATED ORAL at 17:39

## 2020-11-27 RX ADMIN — MIDAZOLAM 2 MG: 1 INJECTION INTRAMUSCULAR; INTRAVENOUS at 10:54

## 2020-11-27 RX ADMIN — ONDANSETRON 4 MG: 2 INJECTION INTRAMUSCULAR; INTRAVENOUS at 10:58

## 2020-11-27 RX ADMIN — SODIUM CHLORIDE 1000 MG: 900 INJECTION, SOLUTION INTRAVENOUS at 17:39

## 2020-11-27 RX ADMIN — ACETAMINOPHEN 650 MG: 325 TABLET, FILM COATED ORAL at 00:49

## 2020-11-27 RX ADMIN — HYDROMORPHONE HYDROCHLORIDE 1 MG: 2 INJECTION INTRAMUSCULAR; INTRAVENOUS; SUBCUTANEOUS at 11:07

## 2020-11-27 RX ADMIN — VANCOMYCIN HYDROCHLORIDE 1500 MG: 100 INJECTION, POWDER, LYOPHILIZED, FOR SOLUTION INTRAVENOUS at 06:16

## 2020-11-27 RX ADMIN — METOCLOPRAMIDE 10 MG: 5 INJECTION, SOLUTION INTRAMUSCULAR; INTRAVENOUS at 10:58

## 2020-11-27 RX ADMIN — HYDROMORPHONE HYDROCHLORIDE 1 MG: 2 INJECTION INTRAMUSCULAR; INTRAVENOUS; SUBCUTANEOUS at 11:03

## 2020-11-27 RX ADMIN — Medication: at 04:52

## 2020-11-27 RX ADMIN — CEFEPIME HYDROCHLORIDE 2 G: 2 INJECTION, POWDER, FOR SOLUTION INTRAVENOUS at 20:43

## 2020-11-27 NOTE — ANESTHESIA PREPROCEDURE EVALUATION
Anesthesia Evaluation     Patient summary reviewed and Nursing notes reviewed   NPO Solid Status: > 8 hours  NPO Liquid Status: > 8 hours           Airway   Mallampati: I  TM distance: >3 FB  Neck ROM: full  No difficulty expected  Dental - normal exam     Comment: Chipped lower crown    Pulmonary - negative pulmonary ROS and normal exam   Cardiovascular - normal exam    ECG reviewed    (+) hypertension, hyperlipidemia,       Neuro/Psych  (+) numbness,     GI/Hepatic/Renal/Endo    (+) morbid obesity, GERD well controlled,  liver disease fatty liver disease, diabetes mellitus type 2 well controlled using insulin,     Musculoskeletal     Abdominal  - normal exam    Bowel sounds: normal.   Substance History - negative use     OB/GYN negative ob/gyn ROS         Other   arthritis,      ROS/Med Hx Other: Charcot foot                Anesthesia Plan    ASA 3     general     intravenous induction     Anesthetic plan, all risks, benefits, and alternatives have been provided, discussed and informed consent has been obtained with: patient.

## 2020-11-27 NOTE — ANESTHESIA POSTPROCEDURE EVALUATION
Patient: Agapito Canseco    Procedure Summary     Date: 11/27/20 Room / Location: [facility] OR 10 / [facility] MAIN OR    Anesthesia Start: 1051 Anesthesia Stop: 1136    Procedure: INCISION AND DRAINAGE LOWER EXTREMITY (Right ) Diagnosis:     Surgeon: MAX Pineda DPM Provider: Don Gerardo MD    Anesthesia Type: general ASA Status: 3          Anesthesia Type: general    Vitals  Vitals Value Taken Time   /58 11/27/20 1137   Temp     Pulse 84 11/27/20 1137   Resp     SpO2 86 % 11/27/20 1137   Vitals shown include unvalidated device data.        Post Anesthesia Care and Evaluation    Patient location during evaluation: PACU  Patient participation: complete - patient participated  Level of consciousness: responsive to physical stimuli  Pain score: 0  Pain management: adequate  Airway patency: patent  Anesthetic complications: No anesthetic complications  PONV Status: none  Cardiovascular status: acceptable  Respiratory status: acceptable  Hydration status: acceptable

## 2020-11-28 ENCOUNTER — APPOINTMENT (OUTPATIENT)
Dept: CARDIOLOGY | Facility: HOSPITAL | Age: 60
End: 2020-11-28

## 2020-11-28 LAB
ANION GAP SERPL CALCULATED.3IONS-SCNC: 12 MMOL/L (ref 5–15)
BACTERIA SPEC AEROBE CULT: ABNORMAL
BACTERIA SPEC AEROBE CULT: ABNORMAL
BASOPHILS # BLD AUTO: 0.1 10*3/MM3 (ref 0–0.2)
BASOPHILS NFR BLD AUTO: 1.1 % (ref 0–1.5)
BH CV ECHO MEAS - % IVS THICK: 60.8 %
BH CV ECHO MEAS - % LVPW THICK: 28.8 %
BH CV ECHO MEAS - ACS: 1.9 CM
BH CV ECHO MEAS - AO MAX PG (FULL): 3 MMHG
BH CV ECHO MEAS - AO MAX PG: 9 MMHG
BH CV ECHO MEAS - AO MEAN PG (FULL): 1.7 MMHG
BH CV ECHO MEAS - AO MEAN PG: 5.2 MMHG
BH CV ECHO MEAS - AO ROOT AREA (BSA CORRECTED): 1.3
BH CV ECHO MEAS - AO ROOT AREA: 8 CM^2
BH CV ECHO MEAS - AO ROOT DIAM: 3.2 CM
BH CV ECHO MEAS - AO V2 MAX: 149.7 CM/SEC
BH CV ECHO MEAS - AO V2 MEAN: 109.2 CM/SEC
BH CV ECHO MEAS - AO V2 VTI: 29.1 CM
BH CV ECHO MEAS - ASC AORTA: 3.4 CM
BH CV ECHO MEAS - AVA(I,A): 2.5 CM^2
BH CV ECHO MEAS - AVA(I,D): 2.5 CM^2
BH CV ECHO MEAS - AVA(V,A): 2.5 CM^2
BH CV ECHO MEAS - AVA(V,D): 2.5 CM^2
BH CV ECHO MEAS - BSA(HAYCOCK): 2.6 M^2
BH CV ECHO MEAS - BSA: 2.5 M^2
BH CV ECHO MEAS - BZI_BMI: 37.7 KILOGRAMS/M^2
BH CV ECHO MEAS - BZI_METRIC_HEIGHT: 185.4 CM
BH CV ECHO MEAS - BZI_METRIC_WEIGHT: 129.7 KG
BH CV ECHO MEAS - EDV(CUBED): 180 ML
BH CV ECHO MEAS - EDV(MOD-SP4): 120.7 ML
BH CV ECHO MEAS - EDV(TEICH): 156.6 ML
BH CV ECHO MEAS - EF(CUBED): 86.5 %
BH CV ECHO MEAS - EF(MOD-BP): 60 %
BH CV ECHO MEAS - EF(MOD-SP4): 60.2 %
BH CV ECHO MEAS - EF(TEICH): 79.5 %
BH CV ECHO MEAS - ESV(CUBED): 24.3 ML
BH CV ECHO MEAS - ESV(MOD-SP4): 48 ML
BH CV ECHO MEAS - ESV(TEICH): 32.1 ML
BH CV ECHO MEAS - FS: 48.7 %
BH CV ECHO MEAS - IVS/LVPW: 0.93
BH CV ECHO MEAS - IVSD: 1.1 CM
BH CV ECHO MEAS - IVSS: 1.7 CM
BH CV ECHO MEAS - LA DIMENSION(2D): 4.3 CM
BH CV ECHO MEAS - LV DIASTOLIC VOL/BSA (35-75): 48.2 ML/M^2
BH CV ECHO MEAS - LV MASS(C)D: 253.1 GRAMS
BH CV ECHO MEAS - LV MASS(C)DI: 101.1 GRAMS/M^2
BH CV ECHO MEAS - LV MASS(C)S: 166.6 GRAMS
BH CV ECHO MEAS - LV MASS(C)SI: 66.5 GRAMS/M^2
BH CV ECHO MEAS - LV MAX PG: 6 MMHG
BH CV ECHO MEAS - LV MEAN PG: 3.6 MMHG
BH CV ECHO MEAS - LV SYSTOLIC VOL/BSA (12-30): 19.2 ML/M^2
BH CV ECHO MEAS - LV V1 MAX: 122.3 CM/SEC
BH CV ECHO MEAS - LV V1 MEAN: 90.4 CM/SEC
BH CV ECHO MEAS - LV V1 VTI: 24.4 CM
BH CV ECHO MEAS - LVIDD: 5.6 CM
BH CV ECHO MEAS - LVIDS: 2.9 CM
BH CV ECHO MEAS - LVOT AREA: 3 CM^2
BH CV ECHO MEAS - LVOT DIAM: 2 CM
BH CV ECHO MEAS - LVPWD: 1.1 CM
BH CV ECHO MEAS - LVPWS: 1.5 CM
BH CV ECHO MEAS - MR MAX PG: 97.4 MMHG
BH CV ECHO MEAS - MR MAX VEL: 493.5 CM/SEC
BH CV ECHO MEAS - MV A MAX VEL: 65.8 CM/SEC
BH CV ECHO MEAS - MV DEC SLOPE: 763.2 CM/SEC^2
BH CV ECHO MEAS - MV DEC TIME: 0.16 SEC
BH CV ECHO MEAS - MV E MAX VEL: 61 CM/SEC
BH CV ECHO MEAS - MV E/A: 0.93
BH CV ECHO MEAS - MV MAX PG: 8.9 MMHG
BH CV ECHO MEAS - MV MEAN PG: 3 MMHG
BH CV ECHO MEAS - MV V2 MAX: 149.2 CM/SEC
BH CV ECHO MEAS - MV V2 MEAN: 79.1 CM/SEC
BH CV ECHO MEAS - MV V2 VTI: 27.9 CM
BH CV ECHO MEAS - MVA(VTI): 2.6 CM^2
BH CV ECHO MEAS - PA ACC TIME: 0.11 SEC
BH CV ECHO MEAS - PA MAX PG (FULL): 1.7 MMHG
BH CV ECHO MEAS - PA MAX PG: 3.7 MMHG
BH CV ECHO MEAS - PA MEAN PG (FULL): 0.98 MMHG
BH CV ECHO MEAS - PA MEAN PG: 2.1 MMHG
BH CV ECHO MEAS - PA PR(ACCEL): 27.6 MMHG
BH CV ECHO MEAS - PA V2 MAX: 96.5 CM/SEC
BH CV ECHO MEAS - PA V2 MEAN: 68.9 CM/SEC
BH CV ECHO MEAS - PA V2 VTI: 18.8 CM
BH CV ECHO MEAS - RV MAX PG: 2.1 MMHG
BH CV ECHO MEAS - RV MEAN PG: 1.1 MMHG
BH CV ECHO MEAS - RV V1 MAX: 71.6 CM/SEC
BH CV ECHO MEAS - RV V1 MEAN: 50.2 CM/SEC
BH CV ECHO MEAS - RV V1 VTI: 14.2 CM
BH CV ECHO MEAS - RVDD: 2.6 CM
BH CV ECHO MEAS - SI(AO): 93.3 ML/M^2
BH CV ECHO MEAS - SI(CUBED): 62.2 ML/M^2
BH CV ECHO MEAS - SI(LVOT): 29.3 ML/M^2
BH CV ECHO MEAS - SI(MOD-SP4): 29 ML/M^2
BH CV ECHO MEAS - SI(TEICH): 49.7 ML/M^2
BH CV ECHO MEAS - SV(AO): 233.6 ML
BH CV ECHO MEAS - SV(CUBED): 155.8 ML
BH CV ECHO MEAS - SV(LVOT): 73.4 ML
BH CV ECHO MEAS - SV(MOD-SP4): 72.7 ML
BH CV ECHO MEAS - SV(TEICH): 124.5 ML
BUN SERPL-MCNC: 26 MG/DL (ref 6–20)
BUN/CREAT SERPL: 15.8 (ref 7–25)
CALCIUM SPEC-SCNC: 8 MG/DL (ref 8.6–10.5)
CHLORIDE SERPL-SCNC: 104 MMOL/L (ref 98–107)
CO2 SERPL-SCNC: 19 MMOL/L (ref 22–29)
CREAT SERPL-MCNC: 1.65 MG/DL (ref 0.76–1.27)
DEPRECATED RDW RBC AUTO: 42.9 FL (ref 37–54)
EOSINOPHIL # BLD AUTO: 0.5 10*3/MM3 (ref 0–0.4)
EOSINOPHIL NFR BLD AUTO: 6.5 % (ref 0.3–6.2)
ERYTHROCYTE [DISTWIDTH] IN BLOOD BY AUTOMATED COUNT: 13.8 % (ref 12.3–15.4)
GFR SERPL CREATININE-BSD FRML MDRD: 43 ML/MIN/1.73
GLUCOSE BLDC GLUCOMTR-MCNC: 194 MG/DL (ref 70–105)
GLUCOSE BLDC GLUCOMTR-MCNC: 230 MG/DL (ref 70–105)
GLUCOSE BLDC GLUCOMTR-MCNC: 231 MG/DL (ref 70–105)
GLUCOSE BLDC GLUCOMTR-MCNC: 271 MG/DL (ref 70–105)
GLUCOSE SERPL-MCNC: 234 MG/DL (ref 65–99)
GRAM STN SPEC: ABNORMAL
HCT VFR BLD AUTO: 31.5 % (ref 37.5–51)
HGB BLD-MCNC: 10.6 G/DL (ref 13–17.7)
ISOLATED FROM: ABNORMAL
ISOLATED FROM: ABNORMAL
LYMPHOCYTES # BLD AUTO: 1 10*3/MM3 (ref 0.7–3.1)
LYMPHOCYTES NFR BLD AUTO: 13.3 % (ref 19.6–45.3)
MAGNESIUM SERPL-MCNC: 1.9 MG/DL (ref 1.6–2.6)
MCH RBC QN AUTO: 29.7 PG (ref 26.6–33)
MCHC RBC AUTO-ENTMCNC: 33.5 G/DL (ref 31.5–35.7)
MCV RBC AUTO: 88.5 FL (ref 79–97)
MONOCYTES # BLD AUTO: 0.9 10*3/MM3 (ref 0.1–0.9)
MONOCYTES NFR BLD AUTO: 11.6 % (ref 5–12)
NEUTROPHILS NFR BLD AUTO: 5.1 10*3/MM3 (ref 1.7–7)
NEUTROPHILS NFR BLD AUTO: 67.5 % (ref 42.7–76)
NRBC BLD AUTO-RTO: 0 /100 WBC (ref 0–0.2)
PLATELET # BLD AUTO: 200 10*3/MM3 (ref 140–450)
PMV BLD AUTO: 8.2 FL (ref 6–12)
POTASSIUM SERPL-SCNC: 3.9 MMOL/L (ref 3.5–5.2)
RBC # BLD AUTO: 3.56 10*6/MM3 (ref 4.14–5.8)
SODIUM SERPL-SCNC: 135 MMOL/L (ref 136–145)
WBC # BLD AUTO: 7.6 10*3/MM3 (ref 3.4–10.8)

## 2020-11-28 PROCEDURE — 82962 GLUCOSE BLOOD TEST: CPT

## 2020-11-28 PROCEDURE — 63710000001 INSULIN LISPRO (HUMAN) PER 5 UNITS: Performed by: PODIATRIST

## 2020-11-28 PROCEDURE — 25010000002 CEFEPIME PER 500 MG: Performed by: PODIATRIST

## 2020-11-28 PROCEDURE — 85025 COMPLETE CBC W/AUTO DIFF WBC: CPT | Performed by: PODIATRIST

## 2020-11-28 PROCEDURE — 93306 TTE W/DOPPLER COMPLETE: CPT | Performed by: INTERNAL MEDICINE

## 2020-11-28 PROCEDURE — 63710000001 INSULIN GLARGINE PER 5 UNITS: Performed by: PODIATRIST

## 2020-11-28 PROCEDURE — 93306 TTE W/DOPPLER COMPLETE: CPT

## 2020-11-28 PROCEDURE — B245ZZ4 ULTRASONOGRAPHY OF LEFT HEART, TRANSESOPHAGEAL: ICD-10-PCS | Performed by: INTERNAL MEDICINE

## 2020-11-28 PROCEDURE — 25010000002 CEFTRIAXONE PER 250 MG: Performed by: INTERNAL MEDICINE

## 2020-11-28 PROCEDURE — 25010000002 SULFUR HEXAFLUORIDE MICROSPH 60.7-25 MG RECONSTITUTED SUSPENSION: Performed by: INTERNAL MEDICINE

## 2020-11-28 PROCEDURE — 99024 POSTOP FOLLOW-UP VISIT: CPT | Performed by: PODIATRIST

## 2020-11-28 PROCEDURE — 25010000002 VANCOMYCIN 1 G RECONSTITUTED SOLUTION 1 EACH VIAL: Performed by: INTERNAL MEDICINE

## 2020-11-28 PROCEDURE — 83735 ASSAY OF MAGNESIUM: CPT | Performed by: PODIATRIST

## 2020-11-28 PROCEDURE — 80048 BASIC METABOLIC PNL TOTAL CA: CPT | Performed by: PODIATRIST

## 2020-11-28 PROCEDURE — 87040 BLOOD CULTURE FOR BACTERIA: CPT | Performed by: NURSE PRACTITIONER

## 2020-11-28 PROCEDURE — 97161 PT EVAL LOW COMPLEX 20 MIN: CPT

## 2020-11-28 PROCEDURE — 94799 UNLISTED PULMONARY SVC/PX: CPT

## 2020-11-28 PROCEDURE — 99232 SBSQ HOSP IP/OBS MODERATE 35: CPT | Performed by: INTERNAL MEDICINE

## 2020-11-28 PROCEDURE — 25010000002 VANCOMYCIN 1 G RECONSTITUTED SOLUTION 1 EACH VIAL: Performed by: PODIATRIST

## 2020-11-28 RX ORDER — VANCOMYCIN 1.75 GRAM/500 ML IN 0.9 % SODIUM CHLORIDE INTRAVENOUS
1750 AS NEEDED
Status: DISCONTINUED | OUTPATIENT
Start: 2020-11-28 | End: 2020-11-29

## 2020-11-28 RX ADMIN — METOPROLOL SUCCINATE 50 MG: 50 TABLET, EXTENDED RELEASE ORAL at 08:30

## 2020-11-28 RX ADMIN — INSULIN LISPRO 5 UNITS: 100 INJECTION, SOLUTION INTRAVENOUS; SUBCUTANEOUS at 08:30

## 2020-11-28 RX ADMIN — FAMOTIDINE 20 MG: 20 TABLET, FILM COATED ORAL at 08:30

## 2020-11-28 RX ADMIN — ATORVASTATIN CALCIUM 40 MG: 40 TABLET, FILM COATED ORAL at 08:32

## 2020-11-28 RX ADMIN — CEFEPIME HYDROCHLORIDE 2 G: 2 INJECTION, POWDER, FOR SOLUTION INTRAVENOUS at 03:19

## 2020-11-28 RX ADMIN — PANTOPRAZOLE SODIUM 40 MG: 40 TABLET, DELAYED RELEASE ORAL at 08:30

## 2020-11-28 RX ADMIN — SODIUM CHLORIDE 1000 MG: 900 INJECTION, SOLUTION INTRAVENOUS at 05:30

## 2020-11-28 RX ADMIN — SULFUR HEXAFLUORIDE 4 ML: KIT at 09:15

## 2020-11-28 RX ADMIN — INSULIN LISPRO 8 UNITS: 100 INJECTION, SOLUTION INTRAVENOUS; SUBCUTANEOUS at 18:02

## 2020-11-28 RX ADMIN — INSULIN GLARGINE 152 UNITS: 100 INJECTION, SOLUTION SUBCUTANEOUS at 08:26

## 2020-11-28 RX ADMIN — Medication 5 MG: at 20:45

## 2020-11-28 RX ADMIN — CEFTRIAXONE SODIUM 2 G: 2 INJECTION, POWDER, FOR SOLUTION INTRAMUSCULAR; INTRAVENOUS at 14:42

## 2020-11-28 RX ADMIN — Medication 10 ML: at 20:39

## 2020-11-28 RX ADMIN — INSULIN LISPRO 3 UNITS: 100 INJECTION, SOLUTION INTRAVENOUS; SUBCUTANEOUS at 12:26

## 2020-11-28 RX ADMIN — CEFEPIME HYDROCHLORIDE 2 G: 2 INJECTION, POWDER, FOR SOLUTION INTRAVENOUS at 12:26

## 2020-11-28 RX ADMIN — LOSARTAN POTASSIUM 100 MG: 50 TABLET, FILM COATED ORAL at 08:30

## 2020-11-28 RX ADMIN — SODIUM CHLORIDE 1000 MG: 900 INJECTION, SOLUTION INTRAVENOUS at 18:03

## 2020-11-29 LAB
ALBUMIN SERPL-MCNC: 3.1 G/DL (ref 3.5–5.2)
ALBUMIN/GLOB SERPL: 1 G/DL
ALP SERPL-CCNC: 103 U/L (ref 39–117)
ALT SERPL W P-5'-P-CCNC: 79 U/L (ref 1–41)
ANION GAP SERPL CALCULATED.3IONS-SCNC: 10 MMOL/L (ref 5–15)
AST SERPL-CCNC: 63 U/L (ref 1–40)
BACTERIA SPEC AEROBE CULT: ABNORMAL
BASOPHILS # BLD AUTO: 0.1 10*3/MM3 (ref 0–0.2)
BASOPHILS NFR BLD AUTO: 0.9 % (ref 0–1.5)
BILIRUB SERPL-MCNC: 0.5 MG/DL (ref 0–1.2)
BUN SERPL-MCNC: 24 MG/DL (ref 6–20)
BUN/CREAT SERPL: 13.1 (ref 7–25)
CALCIUM SPEC-SCNC: 8.1 MG/DL (ref 8.6–10.5)
CHLORIDE SERPL-SCNC: 108 MMOL/L (ref 98–107)
CO2 SERPL-SCNC: 19 MMOL/L (ref 22–29)
CREAT SERPL-MCNC: 1.83 MG/DL (ref 0.76–1.27)
DEPRECATED RDW RBC AUTO: 42 FL (ref 37–54)
EOSINOPHIL # BLD AUTO: 0.6 10*3/MM3 (ref 0–0.4)
EOSINOPHIL NFR BLD AUTO: 6.9 % (ref 0.3–6.2)
ERYTHROCYTE [DISTWIDTH] IN BLOOD BY AUTOMATED COUNT: 13.7 % (ref 12.3–15.4)
ERYTHROCYTE [SEDIMENTATION RATE] IN BLOOD: 116 MM/HR (ref 0–20)
GFR SERPL CREATININE-BSD FRML MDRD: 38 ML/MIN/1.73
GLOBULIN UR ELPH-MCNC: 3 GM/DL
GLUCOSE BLDC GLUCOMTR-MCNC: 165 MG/DL (ref 70–105)
GLUCOSE BLDC GLUCOMTR-MCNC: 183 MG/DL (ref 70–105)
GLUCOSE BLDC GLUCOMTR-MCNC: 224 MG/DL (ref 70–105)
GLUCOSE BLDC GLUCOMTR-MCNC: 245 MG/DL (ref 70–105)
GLUCOSE SERPL-MCNC: 180 MG/DL (ref 65–99)
GRAM STN SPEC: ABNORMAL
GRAM STN SPEC: ABNORMAL
HCT VFR BLD AUTO: 31.8 % (ref 37.5–51)
HGB BLD-MCNC: 10.7 G/DL (ref 13–17.7)
LYMPHOCYTES # BLD AUTO: 1.1 10*3/MM3 (ref 0.7–3.1)
LYMPHOCYTES NFR BLD AUTO: 13 % (ref 19.6–45.3)
MAGNESIUM SERPL-MCNC: 1.9 MG/DL (ref 1.6–2.6)
MCH RBC QN AUTO: 29.9 PG (ref 26.6–33)
MCHC RBC AUTO-ENTMCNC: 33.6 G/DL (ref 31.5–35.7)
MCV RBC AUTO: 89.2 FL (ref 79–97)
MONOCYTES # BLD AUTO: 0.9 10*3/MM3 (ref 0.1–0.9)
MONOCYTES NFR BLD AUTO: 10.3 % (ref 5–12)
NEUTROPHILS NFR BLD AUTO: 5.8 10*3/MM3 (ref 1.7–7)
NEUTROPHILS NFR BLD AUTO: 68.9 % (ref 42.7–76)
NRBC BLD AUTO-RTO: 0 /100 WBC (ref 0–0.2)
PLATELET # BLD AUTO: 217 10*3/MM3 (ref 140–450)
PMV BLD AUTO: 8.2 FL (ref 6–12)
POTASSIUM SERPL-SCNC: 3.7 MMOL/L (ref 3.5–5.2)
PROT SERPL-MCNC: 6.1 G/DL (ref 6–8.5)
RBC # BLD AUTO: 3.56 10*6/MM3 (ref 4.14–5.8)
SODIUM SERPL-SCNC: 137 MMOL/L (ref 136–145)
VANCOMYCIN SERPL-MCNC: 19.4 MCG/ML (ref 5–40)
WBC # BLD AUTO: 8.4 10*3/MM3 (ref 3.4–10.8)

## 2020-11-29 PROCEDURE — 85652 RBC SED RATE AUTOMATED: CPT | Performed by: INTERNAL MEDICINE

## 2020-11-29 PROCEDURE — 85025 COMPLETE CBC W/AUTO DIFF WBC: CPT | Performed by: INTERNAL MEDICINE

## 2020-11-29 PROCEDURE — 63710000001 INSULIN LISPRO (HUMAN) PER 5 UNITS: Performed by: PODIATRIST

## 2020-11-29 PROCEDURE — 99232 SBSQ HOSP IP/OBS MODERATE 35: CPT | Performed by: INTERNAL MEDICINE

## 2020-11-29 PROCEDURE — 83735 ASSAY OF MAGNESIUM: CPT | Performed by: PODIATRIST

## 2020-11-29 PROCEDURE — 80202 ASSAY OF VANCOMYCIN: CPT | Performed by: INTERNAL MEDICINE

## 2020-11-29 PROCEDURE — 82962 GLUCOSE BLOOD TEST: CPT

## 2020-11-29 PROCEDURE — 80053 COMPREHEN METABOLIC PANEL: CPT | Performed by: INTERNAL MEDICINE

## 2020-11-29 PROCEDURE — 63710000001 INSULIN GLARGINE PER 5 UNITS: Performed by: PODIATRIST

## 2020-11-29 PROCEDURE — 94799 UNLISTED PULMONARY SVC/PX: CPT

## 2020-11-29 PROCEDURE — 25010000002 CEFTRIAXONE PER 250 MG: Performed by: INTERNAL MEDICINE

## 2020-11-29 RX ADMIN — PANTOPRAZOLE SODIUM 40 MG: 40 TABLET, DELAYED RELEASE ORAL at 08:41

## 2020-11-29 RX ADMIN — LOSARTAN POTASSIUM 100 MG: 50 TABLET, FILM COATED ORAL at 08:40

## 2020-11-29 RX ADMIN — INSULIN LISPRO 3 UNITS: 100 INJECTION, SOLUTION INTRAVENOUS; SUBCUTANEOUS at 08:40

## 2020-11-29 RX ADMIN — ATORVASTATIN CALCIUM 40 MG: 40 TABLET, FILM COATED ORAL at 08:41

## 2020-11-29 RX ADMIN — CEFTRIAXONE SODIUM 2 G: 2 INJECTION, POWDER, FOR SOLUTION INTRAMUSCULAR; INTRAVENOUS at 14:19

## 2020-11-29 RX ADMIN — ACETAMINOPHEN 650 MG: 325 TABLET, FILM COATED ORAL at 01:17

## 2020-11-29 RX ADMIN — Medication 10 ML: at 20:37

## 2020-11-29 RX ADMIN — INSULIN LISPRO 5 UNITS: 100 INJECTION, SOLUTION INTRAVENOUS; SUBCUTANEOUS at 17:11

## 2020-11-29 RX ADMIN — METOPROLOL SUCCINATE 50 MG: 50 TABLET, EXTENDED RELEASE ORAL at 08:41

## 2020-11-29 RX ADMIN — INSULIN LISPRO 5 UNITS: 100 INJECTION, SOLUTION INTRAVENOUS; SUBCUTANEOUS at 12:31

## 2020-11-29 RX ADMIN — INSULIN GLARGINE 152 UNITS: 100 INJECTION, SOLUTION SUBCUTANEOUS at 08:39

## 2020-11-29 RX ADMIN — FAMOTIDINE 20 MG: 20 TABLET, FILM COATED ORAL at 08:41

## 2020-11-30 LAB
ANION GAP SERPL CALCULATED.3IONS-SCNC: 10 MMOL/L (ref 5–15)
BASOPHILS # BLD AUTO: 0.1 10*3/MM3 (ref 0–0.2)
BASOPHILS NFR BLD AUTO: 1.2 % (ref 0–1.5)
BUN SERPL-MCNC: 20 MG/DL (ref 6–20)
BUN/CREAT SERPL: 11.8 (ref 7–25)
CALCIUM SPEC-SCNC: 8.4 MG/DL (ref 8.6–10.5)
CHLORIDE SERPL-SCNC: 110 MMOL/L (ref 98–107)
CO2 SERPL-SCNC: 21 MMOL/L (ref 22–29)
CREAT SERPL-MCNC: 1.69 MG/DL (ref 0.76–1.27)
DEPRECATED RDW RBC AUTO: 42.4 FL (ref 37–54)
EOSINOPHIL # BLD AUTO: 0.6 10*3/MM3 (ref 0–0.4)
EOSINOPHIL NFR BLD AUTO: 7.6 % (ref 0.3–6.2)
ERYTHROCYTE [DISTWIDTH] IN BLOOD BY AUTOMATED COUNT: 13.7 % (ref 12.3–15.4)
GFR SERPL CREATININE-BSD FRML MDRD: 42 ML/MIN/1.73
GLUCOSE BLDC GLUCOMTR-MCNC: 171 MG/DL (ref 70–105)
GLUCOSE BLDC GLUCOMTR-MCNC: 204 MG/DL (ref 70–105)
GLUCOSE BLDC GLUCOMTR-MCNC: 84 MG/DL (ref 70–105)
GLUCOSE BLDC GLUCOMTR-MCNC: 87 MG/DL (ref 70–105)
GLUCOSE SERPL-MCNC: 105 MG/DL (ref 65–99)
HCT VFR BLD AUTO: 33.1 % (ref 37.5–51)
HGB BLD-MCNC: 11 G/DL (ref 13–17.7)
LAB AP CASE REPORT: NORMAL
LYMPHOCYTES # BLD AUTO: 1.1 10*3/MM3 (ref 0.7–3.1)
LYMPHOCYTES NFR BLD AUTO: 13.8 % (ref 19.6–45.3)
MAGNESIUM SERPL-MCNC: 1.9 MG/DL (ref 1.6–2.6)
MCH RBC QN AUTO: 29.6 PG (ref 26.6–33)
MCHC RBC AUTO-ENTMCNC: 33.1 G/DL (ref 31.5–35.7)
MCV RBC AUTO: 89.5 FL (ref 79–97)
MONOCYTES # BLD AUTO: 0.9 10*3/MM3 (ref 0.1–0.9)
MONOCYTES NFR BLD AUTO: 11.6 % (ref 5–12)
NEUTROPHILS NFR BLD AUTO: 5.3 10*3/MM3 (ref 1.7–7)
NEUTROPHILS NFR BLD AUTO: 65.8 % (ref 42.7–76)
NRBC BLD AUTO-RTO: 0.1 /100 WBC (ref 0–0.2)
PATH REPORT.FINAL DX SPEC: NORMAL
PATH REPORT.GROSS SPEC: NORMAL
PLATELET # BLD AUTO: 251 10*3/MM3 (ref 140–450)
PMV BLD AUTO: 8.1 FL (ref 6–12)
POTASSIUM SERPL-SCNC: 3.7 MMOL/L (ref 3.5–5.2)
RBC # BLD AUTO: 3.7 10*6/MM3 (ref 4.14–5.8)
SODIUM SERPL-SCNC: 141 MMOL/L (ref 136–145)
WBC # BLD AUTO: 8.1 10*3/MM3 (ref 3.4–10.8)

## 2020-11-30 PROCEDURE — 80048 BASIC METABOLIC PNL TOTAL CA: CPT | Performed by: INTERNAL MEDICINE

## 2020-11-30 PROCEDURE — 99232 SBSQ HOSP IP/OBS MODERATE 35: CPT | Performed by: INTERNAL MEDICINE

## 2020-11-30 PROCEDURE — 99024 POSTOP FOLLOW-UP VISIT: CPT | Performed by: PODIATRIST

## 2020-11-30 PROCEDURE — 83735 ASSAY OF MAGNESIUM: CPT | Performed by: PODIATRIST

## 2020-11-30 PROCEDURE — 82962 GLUCOSE BLOOD TEST: CPT

## 2020-11-30 PROCEDURE — 63710000001 INSULIN LISPRO (HUMAN) PER 5 UNITS: Performed by: PODIATRIST

## 2020-11-30 PROCEDURE — 85025 COMPLETE CBC W/AUTO DIFF WBC: CPT | Performed by: NURSE PRACTITIONER

## 2020-11-30 PROCEDURE — 25010000002 CEFTRIAXONE PER 250 MG: Performed by: INTERNAL MEDICINE

## 2020-11-30 RX ADMIN — INSULIN LISPRO 3 UNITS: 100 INJECTION, SOLUTION INTRAVENOUS; SUBCUTANEOUS at 17:29

## 2020-11-30 RX ADMIN — Medication 5 MG: at 00:28

## 2020-11-30 RX ADMIN — CEFTRIAXONE SODIUM 2 G: 2 INJECTION, POWDER, FOR SOLUTION INTRAMUSCULAR; INTRAVENOUS at 12:48

## 2020-12-01 ENCOUNTER — APPOINTMENT (OUTPATIENT)
Dept: CARDIOLOGY | Facility: HOSPITAL | Age: 60
End: 2020-12-01

## 2020-12-01 ENCOUNTER — ANESTHESIA EVENT (OUTPATIENT)
Dept: CARDIOLOGY | Facility: HOSPITAL | Age: 60
End: 2020-12-01

## 2020-12-01 ENCOUNTER — ANESTHESIA (OUTPATIENT)
Dept: CARDIOLOGY | Facility: HOSPITAL | Age: 60
End: 2020-12-01

## 2020-12-01 VITALS
RESPIRATION RATE: 20 BRPM | DIASTOLIC BLOOD PRESSURE: 86 MMHG | HEART RATE: 69 BPM | HEIGHT: 73 IN | SYSTOLIC BLOOD PRESSURE: 161 MMHG | WEIGHT: 297.18 LBS | BODY MASS INDEX: 39.39 KG/M2 | OXYGEN SATURATION: 98 % | TEMPERATURE: 97.3 F

## 2020-12-01 VITALS — DIASTOLIC BLOOD PRESSURE: 58 MMHG | SYSTOLIC BLOOD PRESSURE: 128 MMHG | OXYGEN SATURATION: 100 %

## 2020-12-01 PROBLEM — E11.621 DIABETIC ULCER OF RIGHT FOOT: Status: ACTIVE | Noted: 2020-12-01

## 2020-12-01 PROBLEM — L97.519 DIABETIC ULCER OF RIGHT FOOT: Status: ACTIVE | Noted: 2020-12-01

## 2020-12-01 LAB
ALBUMIN SERPL-MCNC: 2.8 G/DL (ref 3.5–5.2)
ALBUMIN/GLOB SERPL: 0.9 G/DL
ALP SERPL-CCNC: 101 U/L (ref 39–117)
ALT SERPL W P-5'-P-CCNC: 109 U/L (ref 1–41)
ANION GAP SERPL CALCULATED.3IONS-SCNC: 10 MMOL/L (ref 5–15)
AST SERPL-CCNC: 87 U/L (ref 1–40)
BASOPHILS # BLD AUTO: 0.1 10*3/MM3 (ref 0–0.2)
BASOPHILS NFR BLD AUTO: 1 % (ref 0–1.5)
BILIRUB SERPL-MCNC: 0.2 MG/DL (ref 0–1.2)
BUN SERPL-MCNC: 19 MG/DL (ref 6–20)
BUN/CREAT SERPL: 12.4 (ref 7–25)
CALCIUM SPEC-SCNC: 8.4 MG/DL (ref 8.6–10.5)
CHLORIDE SERPL-SCNC: 107 MMOL/L (ref 98–107)
CO2 SERPL-SCNC: 20 MMOL/L (ref 22–29)
CREAT SERPL-MCNC: 1.53 MG/DL (ref 0.76–1.27)
DEPRECATED RDW RBC AUTO: 44.2 FL (ref 37–54)
EOSINOPHIL # BLD AUTO: 0.6 10*3/MM3 (ref 0–0.4)
EOSINOPHIL NFR BLD AUTO: 8.2 % (ref 0.3–6.2)
ERYTHROCYTE [DISTWIDTH] IN BLOOD BY AUTOMATED COUNT: 13.9 % (ref 12.3–15.4)
GFR SERPL CREATININE-BSD FRML MDRD: 47 ML/MIN/1.73
GLOBULIN UR ELPH-MCNC: 3.1 GM/DL
GLUCOSE BLDC GLUCOMTR-MCNC: 170 MG/DL (ref 70–105)
GLUCOSE BLDC GLUCOMTR-MCNC: 185 MG/DL (ref 70–105)
GLUCOSE BLDC GLUCOMTR-MCNC: 188 MG/DL (ref 70–105)
GLUCOSE BLDC GLUCOMTR-MCNC: 213 MG/DL (ref 70–105)
GLUCOSE BLDC GLUCOMTR-MCNC: 219 MG/DL (ref 70–105)
GLUCOSE SERPL-MCNC: 265 MG/DL (ref 65–99)
HCT VFR BLD AUTO: 32.2 % (ref 37.5–51)
HGB BLD-MCNC: 10.6 G/DL (ref 13–17.7)
LYMPHOCYTES # BLD AUTO: 1.3 10*3/MM3 (ref 0.7–3.1)
LYMPHOCYTES NFR BLD AUTO: 19.2 % (ref 19.6–45.3)
MAGNESIUM SERPL-MCNC: 1.7 MG/DL (ref 1.6–2.6)
MCH RBC QN AUTO: 29.8 PG (ref 26.6–33)
MCHC RBC AUTO-ENTMCNC: 32.8 G/DL (ref 31.5–35.7)
MCV RBC AUTO: 90.7 FL (ref 79–97)
MONOCYTES # BLD AUTO: 0.9 10*3/MM3 (ref 0.1–0.9)
MONOCYTES NFR BLD AUTO: 13.3 % (ref 5–12)
NEUTROPHILS NFR BLD AUTO: 4 10*3/MM3 (ref 1.7–7)
NEUTROPHILS NFR BLD AUTO: 58.3 % (ref 42.7–76)
NRBC BLD AUTO-RTO: 0.1 /100 WBC (ref 0–0.2)
PLATELET # BLD AUTO: 220 10*3/MM3 (ref 140–450)
PMV BLD AUTO: 8.1 FL (ref 6–12)
POTASSIUM SERPL-SCNC: 4.2 MMOL/L (ref 3.5–5.2)
PROT SERPL-MCNC: 5.9 G/DL (ref 6–8.5)
RBC # BLD AUTO: 3.55 10*6/MM3 (ref 4.14–5.8)
SODIUM SERPL-SCNC: 137 MMOL/L (ref 136–145)
WBC # BLD AUTO: 6.9 10*3/MM3 (ref 3.4–10.8)

## 2020-12-01 PROCEDURE — 93312 ECHO TRANSESOPHAGEAL: CPT

## 2020-12-01 PROCEDURE — 82962 GLUCOSE BLOOD TEST: CPT

## 2020-12-01 PROCEDURE — 85025 COMPLETE CBC W/AUTO DIFF WBC: CPT | Performed by: INTERNAL MEDICINE

## 2020-12-01 PROCEDURE — 80053 COMPREHEN METABOLIC PANEL: CPT | Performed by: INTERNAL MEDICINE

## 2020-12-01 PROCEDURE — 93325 DOPPLER ECHO COLOR FLOW MAPG: CPT | Performed by: INTERNAL MEDICINE

## 2020-12-01 PROCEDURE — 25010000002 CEFTRIAXONE PER 250 MG: Performed by: INTERNAL MEDICINE

## 2020-12-01 PROCEDURE — 93325 DOPPLER ECHO COLOR FLOW MAPG: CPT

## 2020-12-01 PROCEDURE — 93320 DOPPLER ECHO COMPLETE: CPT

## 2020-12-01 PROCEDURE — 99239 HOSP IP/OBS DSCHRG MGMT >30: CPT | Performed by: INTERNAL MEDICINE

## 2020-12-01 PROCEDURE — 25010000002 PROPOFOL 10 MG/ML EMULSION: Performed by: ANESTHESIOLOGY

## 2020-12-01 PROCEDURE — 93312 ECHO TRANSESOPHAGEAL: CPT | Performed by: INTERNAL MEDICINE

## 2020-12-01 PROCEDURE — C1751 CATH, INF, PER/CENT/MIDLINE: HCPCS

## 2020-12-01 PROCEDURE — 83735 ASSAY OF MAGNESIUM: CPT | Performed by: PODIATRIST

## 2020-12-01 PROCEDURE — 05HY33Z INSERTION OF INFUSION DEVICE INTO UPPER VEIN, PERCUTANEOUS APPROACH: ICD-10-PCS | Performed by: INTERNAL MEDICINE

## 2020-12-01 PROCEDURE — 93320 DOPPLER ECHO COMPLETE: CPT | Performed by: INTERNAL MEDICINE

## 2020-12-01 RX ORDER — ACETAMINOPHEN 325 MG/1
650 TABLET ORAL ONCE
Status: COMPLETED | OUTPATIENT
Start: 2020-12-01 | End: 2020-12-01

## 2020-12-01 RX ORDER — PROPOFOL 10 MG/ML
VIAL (ML) INTRAVENOUS AS NEEDED
Status: DISCONTINUED | OUTPATIENT
Start: 2020-12-01 | End: 2020-12-01 | Stop reason: SURG

## 2020-12-01 RX ORDER — CHOLECALCIFEROL (VITAMIN D3) 125 MCG
5 CAPSULE ORAL ONCE
Status: COMPLETED | OUTPATIENT
Start: 2020-12-01 | End: 2020-12-01

## 2020-12-01 RX ORDER — HYDROCODONE BITARTRATE AND ACETAMINOPHEN 7.5; 325 MG/1; MG/1
1 TABLET ORAL EVERY 6 HOURS PRN
Qty: 15 TABLET | Refills: 0 | Status: SHIPPED | OUTPATIENT
Start: 2020-12-01 | End: 2020-12-06

## 2020-12-01 RX ADMIN — METOPROLOL SUCCINATE 50 MG: 50 TABLET, EXTENDED RELEASE ORAL at 11:20

## 2020-12-01 RX ADMIN — PROPOFOL 500 MG: 10 INJECTION, EMULSION INTRAVENOUS at 14:48

## 2020-12-01 RX ADMIN — PROPOFOL 50 MG: 10 INJECTION, EMULSION INTRAVENOUS at 14:39

## 2020-12-01 RX ADMIN — Medication 5 MG: at 00:29

## 2020-12-01 RX ADMIN — CEFTRIAXONE SODIUM 2 G: 2 INJECTION, POWDER, FOR SOLUTION INTRAMUSCULAR; INTRAVENOUS at 16:22

## 2020-12-01 RX ADMIN — PROPOFOL 50 MG: 10 INJECTION, EMULSION INTRAVENOUS at 14:45

## 2020-12-01 RX ADMIN — PROPOFOL 100 MG: 10 INJECTION, EMULSION INTRAVENOUS at 14:37

## 2020-12-01 RX ADMIN — LOSARTAN POTASSIUM 100 MG: 50 TABLET, FILM COATED ORAL at 11:21

## 2020-12-01 RX ADMIN — ACETAMINOPHEN 650 MG: 325 TABLET, FILM COATED ORAL at 00:29

## 2020-12-01 RX ADMIN — PROPOFOL 50 MG: 10 INJECTION, EMULSION INTRAVENOUS at 14:42

## 2020-12-01 NOTE — DISCHARGE SUMMARY
[facility] Medicine Services  DISCHARGE SUMMARY        Prepared For PCP:  Tete Blank MD    Patient Name: Agapito Canseco  : 1960  MRN: [id number]      Date of Admission:   2020    Date of Discharge:  2020    Length of stay:  LOS: 3 days     Hospital Course     Presenting Problem:   Cellulitis of right foot [L03.115]  Fever, unspecified fever cause [R50.9]  Dyspnea, unspecified type [R06.00]  COVID-19 ruled out [Z03.818]  Cellulitis of right foot [L03.115]      Active Hospital Problems    Diagnosis  POA   • **Cellulitis of right foot [L03.115]  Yes   • Elevated d-dimer [R79.89]  Yes   • Obesity (BMI 30-39.9) [E66.9]  Yes   • Hyperlipidemia [E78.5]  Yes   • Charcot foot due to diabetes mellitus (CMS/HCC) [E11.610]  Yes   • Type II diabetes mellitus (CMS/HCC) [E11.9]  Yes   • GERD (gastroesophageal reflux disease) [K21.9]  Yes   • Essential hypertension [I10]  Yes   • Infected diabetic foot ulcer (CMS/HCC) [E11.621, L97.509]  Yes   • NAFLD (nonalcoholic fatty liver disease) [K76.0]  Yes      Resolved Hospital Problems   No resolved problems to display.           Hospital Course:  60-year-old male with known history of diabetes mellitus type 2, Charcot foot deformity involving the right lower extremities, morbid obesity, BMI of 39, hypertension, nonalcoholic fatty liver disease, hyperlipidemia and GERD.  According to admission note, he presented to [facility] ER on 2020, complaining of swelling of his right foot.  He endorses low-grade fever, and some nausea, but denies chest pain, no shortness of breath, or vomiting.  Laboratory showed lactic acid level of 1.6 , elevated D-dimer of 1.64, otherwise, no significant leukocytosis, and acute kidney injury.  Chest x-ray showed no acute cardiopulmonary process.  X-ray of the foot demonstrated destructive changes in the midfoot related to chronic Charcot arthropathy.  CT for PE protocol negative for  pulmonary embolism.  EKG shows sinus tachycardia with heart rate of 119 bpm.  Covid-19 screening was not detectable.       He was evaluated by podiatrist, and recommended MRI of the foot.  MRI of the right foot on 11/26/2020 showed extensive cellulitis with large subcutaneous rim-enhancing fluid collection at the plantar aspect of the foot at the level of the cuboid and midfoot consistent with soft tissue abscess.  No definite finding of osteomyelitis.  Blood cultures on 11/25/2020 grew methicillin susceptible Staphylococcus aureus, and wound culture on 11/27/2020 with result pending.  He underwent incision and drainage of the right foot abscess with  bone biopsy on 11/27/2020 by .  Initially treated with IV vancomycin/cefepime however, he is noted with worsening renal function in the setting of bacteremia and broad-spectrum antibiotic.  Antibiotic was discontinued and switched to Rocephin as per ID recommendations.  He is status post AISHA today with results pending.  ID recommended to continue with Rocephin 2 g x 6 weeks.  Prior to discharge home, he is afebrile, with stable hemodynamics.  Renal function is  improving.  He is to follow-up with his PCP and podiatrist as scheduled.         Recommendation for Outpatient Providers:     1.  Follow-up with podiatrist and primary care physician as scheduled    Reasons For Change In Medications and Indications for New Medications:      Day of Discharge     HPI:     Vital Signs:   Temp:  [97.3 °F (36.3 °C)-100.2 °F (37.9 °C)] 97.3 °F (36.3 °C)  Heart Rate:  [62-73] 69  Resp:  [16-21] 20  BP: (127-189)/(58-98) 161/86     Physical Exam  Constitutional:       General: He is no distress   Obese     Appearance: Normal appearance.   HENT:      Head: Normocephalic and atraumatic.      Mouth/Throat:      Mouth: Mucous membranes are moist.   Neck:      Musculoskeletal: Normal range of motion and neck supple. No neck rigidity or muscular tenderness.   Cardiovascular:       Rate and Rhythm: Normal rate and regular rhythm.      Pulses: Normal pulses.      Heart sounds: Normal heart sounds. No murmur. No friction rub.   Pulmonary:      Effort: Pulmonary effort is normal. No respiratory distress.      Breath sounds: Normal breath sounds. No wheezing.   Abdominal:      Palpations: Abdomen is soft.   Musculoskeletal: Normal range of motion.      Comments: Right lower extremity is dressing in place   Skin:     General: Skin is warm.   Neurological:      General: No focal deficit present.      Mental Status: He is alert and oriented to person, place, and time.   Psychiatric:         Behavior: Behavior normal.   Pertinent  and/or Most Recent Results     Results from last 7 days   Lab Units 12/01/20  0231 11/30/20  0204 11/29/20  0234 11/28/20  0717 11/27/20  0434 11/26/20  0351 11/25/20  1835 11/25/20  1601   WBC 10*3/mm3 6.90 8.10 8.40 7.60 9.40 10.20  --  11.50*   HEMOGLOBIN g/dL 10.6* 11.0* 10.7* 10.6* 11.2* 12.2*  --  13.3   HEMATOCRIT % 32.2* 33.1* 31.8* 31.5* 33.4* 36.3*  --  39.7   PLATELETS 10*3/mm3 220 251 217 200 182 160  --  193   SODIUM mmol/L 137 141 137 135* 137 138  --  135*   POTASSIUM mmol/L 4.2 3.7 3.7 3.9 3.4* 3.3*  --  3.8   CHLORIDE mmol/L 107 110* 108* 104 103 102  --  97*   CO2 mmol/L 20.0* 21.0* 19.0* 19.0* 23.0 24.0  --  24.0   BUN mg/dL 19 20 24* 26* 20 14  --  13   CREATININE mg/dL 1.53* 1.69* 1.83* 1.65* 1.47* 1.10 1.30 1.22   GLUCOSE mg/dL 265* 105* 180* 234* 155* 180*  --  147*   CALCIUM mg/dL 8.4* 8.4* 8.1* 8.0* 8.3* 8.6  --  9.2     Results from last 7 days   Lab Units 12/01/20  0231 11/29/20  0234 11/25/20  2341 11/25/20  1601   BILIRUBIN mg/dL 0.2 0.5  --  1.0   ALK PHOS U/L 101 103  --  63   ALT (SGPT) U/L 109* 79*  --  27   AST (SGOT) U/L 87* 63*  --  28   PROTIME Seconds  --   --  12.6*  --    INR   --   --  1.15*  --    APTT seconds  --   --  31.7*  --            Invalid input(s): TG, LDLCALC, LDLREALC  Results from last 7 days   Lab Units  11/25/20  2341 11/25/20  1610 11/25/20  1601   HEMOGLOBIN A1C % 7.4*  --   --    TROPONIN T ng/mL  --   --  <0.010   LACTATE mmol/L  --  1.6  --        Brief Urine Lab Results  (Last result in the past 365 days)      Color   Clarity   Blood   Leuk Est   Nitrite   Protein   CREAT   Urine HCG        11/25/20 1702 Dark Yellow Clear Trace Negative Negative 30 mg/dL (1+)               Microbiology Results Abnormal     Procedure Component Value - Date/Time    Blood Culture - Blood, Arm, Right [[id number]] Collected: 11/28/20 0717    Lab Status: Preliminary result Specimen: Blood from Arm, Right Updated: 12/01/20 0745     Blood Culture No growth at 3 days    Anaerobic Culture - Wound, Foot, Right [[id number]] Collected: 11/27/20 1114    Lab Status: Preliminary result Specimen: Wound from Foot, Right Updated: 11/30/20 0749     Anaerobic Culture Screening for Anaerobes    Wound Culture - Wound, Foot, Right [[id number]]  (Abnormal)  (Susceptibility) Collected: 11/27/20 1114    Lab Status: Final result Specimen: Wound from Foot, Right Updated: 11/29/20 0834     Wound Culture Moderate growth (3+) Staphylococcus aureus     Comment: Methicillin resistant Staphylococcus aureus, Patient may be an isolation risk.        Gram Stain Rare (1+) WBCs per low power field      Rare (1+) Gram positive cocci    Susceptibility      Staphylococcus aureus     ROBB     Clindamycin Susceptible     Erythromycin Susceptible     Inducible Clindamycin Resistance Negative     Oxacillin Susceptible     Penicillin G Resistant     Rifampin Susceptible     Tetracycline Susceptible     Trimethoprim + Sulfamethoxazole Susceptible     Vancomycin Susceptible                Susceptibility Comments     Staphylococcus aureus    This isolate does not demonstrate inducible clindamycin resistance in vitro.               Blood Culture - Blood, Hand, Right [[id number]]  (Abnormal) Collected: 11/25/20 1555    Lab Status: Final result Specimen: Blood from Hand, Right  Updated: 11/28/20 0824     Blood Culture Staphylococcus aureus     Comment: Infectious disease consultation is highly recommended to rule out distant foci of infection.        Isolated from Anaerobic Bottle     Gram Stain Anaerobic Bottle Gram positive cocci in clusters    Narrative:      For MICs refer to blood culture Collected 11/25/2020 1601.     Blood Culture - Blood, Arm, Left [[id number]]  (Abnormal)  (Susceptibility) Collected: 11/25/20 1601    Lab Status: Final result Specimen: Blood from Arm, Left Updated: 11/28/20 0704     Blood Culture Staphylococcus aureus     Comment: Infectious disease consultation is highly recommended to rule out distant foci of infection.        Isolated from Aerobic and Anaerobic Bottles     Gram Stain Anaerobic Bottle Gram positive cocci in clusters      Aerobic Bottle Gram positive cocci in clusters    Susceptibility      Staphylococcus aureus     ROBB     Gentamicin Susceptible     Oxacillin Susceptible     Rifampin Susceptible     Vancomycin Susceptible                Susceptibility Comments     Staphylococcus aureus    This isolate does not demonstrate inducible clindamycin resistance in vitro.               COVID-19, [facility] IN-HOUSE,NP Swab (NO TRANSPORT MEDIA) 2 HR TAT - Swab, Nasopharynx [[id number]]  (Normal) Collected: 11/25/20 1606    Lab Status: Final result Specimen: Swab from Nasopharynx Updated: 11/25/20 1640     COVID19 Not Detected    Narrative:      Fact sheet for providers: https://www.fda.gov/media/913770/download     Fact sheet for patients: https://www.fda.gov/media/824601/download          Xr Foot 3+ View Right    Result Date: 11/25/2020  Impression: 1. Redemonstration of destructive changes in the midfoot related to chronic Charcot arthropathy similar to prior study. 2. No definite new area of osseous erosion or fracture. 3. Soft tissue swelling most pronounced involving the plantar aspect of the midfoot.  Electronically Signed By-Jermaine Hayes MD On:11/25/2020  4:28 PM This report was finalized on [id number] by  Jermaine Hayes MD.    Xr Chest 1 View    Result Date: 11/25/2020  Impression: No acute cardiopulmonary process.  Electronically Signed By-Savannah William MD On:11/25/2020 4:21 PM This report was finalized on [id number] by  Savannah William MD.    Ct Chest Pulmonary Embolism    Result Date: 11/25/2020  Impression: 1. Negative for pulmonary embolus. 2. No acute intrathoracic process. 3. Extensive coronary atherosclerotic disease.  Electronically Signed By-Jermaine Hayes MD On:11/25/2020 7:24 PM This report was finalized on [id number] by  Jermaine Hayes MD.      Results for orders placed during the hospital encounter of 11/25/20   Duplex Venous Lower Extremity - Bilateral CAR    Narrative · Acute right lower extremity superficial thrombophlebitis noted in the   greater saphenous (above knee).  · All other veins appeared normal bilaterally.          Results for orders placed during the hospital encounter of 11/25/20   Duplex Venous Lower Extremity - Bilateral CAR    Narrative · Acute right lower extremity superficial thrombophlebitis noted in the   greater saphenous (above knee).  · All other veins appeared normal bilaterally.          Results for orders placed during the hospital encounter of 11/25/20   Adult Transthoracic Echo Complete W/ Cont if Necessary Per Protocol    Narrative · Estimated left ventricular EF was in agreement with the calculated left   ventricular EF. Left ventricular ejection fraction appears to be 56 - 60%.   Left ventricular systolic function is normal.  · Left ventricular diastolic function was normal.                  Test Results Pending at Discharge  Pending Labs     Order Current Status    Anaerobic Culture - Wound, Foot, Right Preliminary result    Blood Culture - Blood, Arm, Right Preliminary result            Procedures Performed  Procedure(s):  DEBRIDEMENT RIGHT FOOT         Consults:   Consults     Date and Time Order Name Status  Description    11/28/2020 1245 Inpatient Cardiology Consult      11/27/2020 1226 Inpatient Infectious Diseases Consult Completed     11/26/2020 0628 Inpatient Podiatry Consult Completed     11/25/2020 1179 Hospitalist (on-call MD unless specified) Completed             Discharge Details        Discharge Medications      New Medications      Instructions Start Date   cefTRIAXone 2 g in sodium chloride 0.9 % 100 mL IVPB   2 g, Intravenous, Every 24 Hours      HYDROcodone-acetaminophen 7.5-325 MG per tablet  Commonly known as: Norco   1 tablet, Oral, Every 6 Hours PRN         Continue These Medications      Instructions Start Date   aspirin 81 MG chewable tablet   81 mg, Oral, Daily      atorvastatin 40 MG tablet  Commonly known as: LIPITOR   40 mg, Oral, Daily      LOSARTAN POTASSIUM PO   100 mg, Oral, Daily      metFORMIN 1000 MG tablet  Commonly known as: GLUCOPHAGE   1,000 mg, Oral, 2 Times Daily With Meals      metoprolol succinate XL 50 MG 24 hr tablet  Commonly known as: TOPROL-XL   50 mg, Oral, Daily      pantoprazole 40 MG EC tablet  Commonly known as: PROTONIX   40 mg, Oral, Daily      pioglitazone 30 MG tablet  Commonly known as: ACTOS   30 mg, Oral, Daily      raNITIdine 150 MG tablet  Commonly known as: ZANTAC   150 mg, Oral, Daily      Tresiba 100 UNIT/ML solution injection  Generic drug: Insulin Degludec   152 Units, Subcutaneous, Daily, Patient takes 2 injections of 76 units of Tresiba in the morning, 76 units on one side of the abdomen and 76 units on the other side of the abdomen for a total of 152 units daily in the AM       VICTOZA SC   1.8 mg, Subcutaneous, Daily      VITAMIN D2 PO   1.25 mg, Oral, 2 Times Weekly, Patient taks on Sundays and Wednesdays              No Known Allergies      Discharge Disposition:  Home or Self Care    Diet:  Hospital:  Diet Order   Procedures   • Diet Diabetic/Consistent Carbs; Diabetic - Consistent Carb         Discharge Activity:         CODE STATUS:    Code Status  and Medical Interventions:   Ordered at: 11/25/20 2103     Code Status:    CPR     Medical Interventions (Level of Support Prior to Arrest):    Full         Follow-up Appointments  Future Appointments   Date Time Provider Department Center   12/3/2020  3:15 PM [facility]  [facility] None         Condition on Discharge:      Stable      Electronically signed by Scar Patino MD, 12/01/20, 4:20 PM EST.      Time: I spent  >30  minutes on this discharge activity which included face-to-face encounter with the patient/reviewing the data in the system/coordination of the care with the nursing staff as well as consultants/documentation/entering orders.

## 2020-12-01 NOTE — PROGRESS NOTES
[facility] Medicine Services Daily Progress Note      Hospitalist Team  LOS 3 days      Patient Care Team:  Tete Blank MD as PCP - General    Patient Location: 4112/1      Subjective   Subjective   Feels better and has no complaint.  Chief Complaint / Subjective  Chief Complaint   Patient presents with   • Fever         Brief Synopsis of Hospital Course/HPI  60-year-old male with known history of diabetes mellitus type 2, Charcot foot deformity involving the right lower extremities, morbid obesity, BMI of 39, hypertension, nonalcoholic fatty liver disease, hyperlipidemia and GERD.  According to admission note, he presented to [facility] ER on 11/25/2020, complaining of swelling of his right foot.  He endorses low-grade fever, and some nausea, but denies chest pain, no shortness of breath, or vomiting.  Laboratory showed lactic acid level of 1.6 , elevated D-dimer of 1.64, otherwise, no significant leukocytosis, and acute kidney injury.  Chest x-ray showed no acute cardiopulmonary process.  X-ray of the foot demonstrated destructive changes in the midfoot related to chronic Charcot arthropathy.  CT for PE protocol negative for pulmonary embolism.  EKG shows sinus tachycardia with heart rate of 119 bpm.  Covid-19 screening was not detectable.       He was evaluated by podiatrist, and recommended MRI of the foot.  MRI of the right foot on 11/26/2020 showed extensive cellulitis with large subcutaneous rim-enhancing fluid collection at the plantar aspect of the foot at the level of the cuboid and midfoot consistent with soft tissue abscess.  No definite finding of osteomyelitis.  Blood cultures on 11/25/2020 grew methicillin susceptible Staphylococcus aureus, and wound culture on 11/27/2020 with result pending.  He underwent incision and drainage of the right foot abscess and bone biopsy on 11/27/2020 by , and initially treated with IV vancomycin/cefepime.  He is  "noted with worsening renal function in the setting of bacteremia and broad-spectrum antibiotic with vancomycin/cefepime which has been discontinued.       Objective   Objective    Seen and examined in follow-up evaluation.  Plan for AISHA today  Vital Signs  Temp:  [98.1 °F (36.7 °C)-100.2 °F (37.9 °C)] 98.3 °F (36.8 °C)  Heart Rate:  [70-73] 70  Resp:  [16-18] 16  BP: (144-178)/(76-96) 170/83  Oxygen Therapy  SpO2: 96 %  Pulse Oximetry Type: Intermittent  Device (Oxygen Therapy): room air  Flow (L/min): 8  Flowsheet Rows      First Filed Value   Admission Height  185.4 cm (73\") Documented at 11/25/2020 1446   Admission Weight  128 kg (282 lb 13.6 oz) Documented at 11/25/2020 1446        Intake & Output (last 3 days)       11/28 0701 - 11/29 0700 11/29 0701 - 11/30 0700 11/30 0701 - 12/01 0700 12/01 0701 - 12/02 0700    P.O. 2100 1420 720 0    I.V. (mL/kg)   750 (5.6)     IV Piggyback        Total Intake(mL/kg) 2100 (16.2) 1420 (10.9) 1470 (10.9) 0 (0)    Urine (mL/kg/hr) 2020 (0.6) 1310 (0.4) 1400 (0.4) 400 (1.1)    Stool 0  0     Total Output 2020 1310 1400 400    Net +80 +110 +70 -400            Urine Unmeasured Occurrence  1 x 1 x     Stool Unmeasured Occurrence 1 x  1 x         Lines, Drains & Airways    Active LDAs     Name:   Placement date:   Placement time:   Site:   Days:    Peripheral IV 11/27/20 1242 Distal;Posterior;Right Wrist   11/27/20    1242    Wrist   3                  Physical Exam:  Constitutional:       General: He is in acute distress.  Obese     Appearance: Normal appearance.   HENT:      Head: Normocephalic and atraumatic.      Mouth/Throat:      Mouth: Mucous membranes are moist.   Neck:      Musculoskeletal: Normal range of motion and neck supple. No neck rigidity or muscular tenderness.   Cardiovascular:      Rate and Rhythm: Normal rate and regular rhythm.      Pulses: Normal pulses.      Heart sounds: Normal heart sounds. No murmur. No friction rub.   Pulmonary:      Effort: Pulmonary " effort is normal. No respiratory distress.      Breath sounds: Normal breath sounds. No wheezing.   Abdominal:      Palpations: Abdomen is soft.   Musculoskeletal: Normal range of motion.      Comments: Right lower extremity is dressing in place   Skin:     General: Skin is warm.   Neurological:      General: No focal deficit present.      Mental Status: He is alert and oriented to person, place, and time.   Psychiatric:         Behavior: Behavior normal.           Wounds (last 24 hours)      LDA Wound     Row Name 12/01/20 0731 11/30/20 2040 11/30/20 1253       Wound 11/25/20 2228 Right medial plantar Diabetic Ulcer    Wound - Properties Group Placement Date: 11/25/20  -PB Placement Time: 2228  -PB Side: Right  -PB Orientation: medial  -PB Location: plantar  -PB Primary Wound Type: Diabetic ulc  -PB Stage, Pressure Injury : unstageable  -PB Additional Comments: Has been going to wound clinic  -PB    Dressing Appearance  dry;intact  -AR  --  --    Closure  NISHANT  -AR  --  --    Base  dressing in place, unable to visualize  -AR  --  --    Periwound Temperature  warm  -AR  --  --    Drainage Amount  none  -AR  --  --    Retired Wound - Properties Group Date first assessed: 11/25/20  -PB Time first assessed: 2228  -PB Side: Right  -PB Location: plantar  -PB Primary Wound Type: Diabetic ulc  -PB Additional Comments: Has been going to wound clinic  -PB       Wound 11/27/20 1107 Right foot Incision    Wound - Properties Group Placement Date: 11/27/20  -PH Placement Time: 1107  -PH Present on Hospital Admission: N  -PH Side: Right  -PH Location: foot  -PH Primary Wound Type: Incision  -PH    Dressing Appearance  dry;intact  -AR  dry;intact;no drainage  -BG  --    Closure  NISHANT  -AR  NISHANT  -BG  --    Base  dressing in place, unable to visualize  -AR  dressing in place, unable to visualize  -BG  --    Dressing Care  --  --  dressing changed;gauze;other (see comments);elastic bandage betadine gauze, kerlix and ace wrap  -KS     Retired Wound - Properties Group Date first assessed: 11/27/20  -PH Time first assessed: 1107  -PH Present on Hospital Admission: N  -PH Side: Right  -PH Location: foot  -PH Primary Wound Type: Incision  -PH      User Key  (r) = Recorded By, (t) = Taken By, (c) = Cosigned By    Initials Name Provider Type    PH Tete Neff RN Registered Nurse    Kim Small RN Registered Nurse    Estrella Archer RN Registered Nurse    Sharee Lyn RN Registered Nurse    Maura Lopez LPN Licensed Nurse          Procedures:    Procedure(s):  DEBRIDEMENT RIGHT FOOT          Results Review:     I reviewed the patient's new clinical results.      Lab Results (last 24 hours)     Procedure Component Value Units Date/Time    Blood Culture - Blood, Arm, Right [[id number]] Collected: 11/28/20 0717    Specimen: Blood from Arm, Right Updated: 12/01/20 0745     Blood Culture No growth at 3 days    POC Glucose Once [[id number]]  (Abnormal) Collected: 12/01/20 0735    Specimen: Blood Updated: 12/01/20 0737     Glucose 213 mg/dL      Comment: Serial Number: [id number]:  [id number]       POC Glucose Once [[id number]]  (Abnormal) Collected: 12/01/20 0556    Specimen: Blood Updated: 12/01/20 0557     Glucose 219 mg/dL      Comment: Serial Number: [id number]:  [id number]       Magnesium [[id number]]  (Normal) Collected: 12/01/20 0231    Specimen: Blood Updated: 12/01/20 0303     Magnesium 1.7 mg/dL     Comprehensive Metabolic Panel [[id number]]  (Abnormal) Collected: 12/01/20 0231    Specimen: Blood Updated: 12/01/20 0303     Glucose 265 mg/dL      BUN 19 mg/dL      Creatinine 1.53 mg/dL      Sodium 137 mmol/L      Potassium 4.2 mmol/L      Comment: Slight hemolysis detected by analyzer. Results may be affected.        Chloride 107 mmol/L      CO2 20.0 mmol/L      Calcium 8.4 mg/dL      Total Protein 5.9 g/dL      Albumin 2.80 g/dL      ALT (SGPT) 109 U/L      AST (SGOT) 87 U/L      Alkaline Phosphatase 101 U/L       Total Bilirubin 0.2 mg/dL      eGFR Non African Amer 47 mL/min/1.73      Globulin 3.1 gm/dL      A/G Ratio 0.9 g/dL      BUN/Creatinine Ratio 12.4     Anion Gap 10.0 mmol/L     Narrative:      GFR Normal >60  Chronic Kidney Disease <60  Kidney Failure <15      CBC & Differential [[id number]]  (Abnormal) Collected: 12/01/20 0231    Specimen: Blood Updated: 12/01/20 0245    Narrative:      The following orders were created for panel order CBC & Differential.  Procedure                               Abnormality         Status                     ---------                               -----------         ------                     CBC Auto Differential[[id number]]        Abnormal            Final result                 Please view results for these tests on the individual orders.    CBC Auto Differential [[id number]]  (Abnormal) Collected: 12/01/20 0231    Specimen: Blood Updated: 12/01/20 0245     WBC 6.90 10*3/mm3      RBC 3.55 10*6/mm3      Hemoglobin 10.6 g/dL      Hematocrit 32.2 %      MCV 90.7 fL      MCH 29.8 pg      MCHC 32.8 g/dL      RDW 13.9 %      RDW-SD 44.2 fl      MPV 8.1 fL      Platelets 220 10*3/mm3      Neutrophil % 58.3 %      Lymphocyte % 19.2 %      Monocyte % 13.3 %      Eosinophil % 8.2 %      Basophil % 1.0 %      Neutrophils, Absolute 4.00 10*3/mm3      Lymphocytes, Absolute 1.30 10*3/mm3      Monocytes, Absolute 0.90 10*3/mm3      Eosinophils, Absolute 0.60 10*3/mm3      Basophils, Absolute 0.10 10*3/mm3      nRBC 0.1 /100 WBC     POC Glucose Once [[id number]]  (Abnormal) Collected: 11/30/20 2126    Specimen: Blood Updated: 11/30/20 2127     Glucose 204 mg/dL      Comment: Serial Number: [id number]:  [id number]       POC Glucose Once [[id number]]  (Abnormal) Collected: 11/30/20 1706    Specimen: Blood Updated: 11/30/20 1709     Glucose 171 mg/dL      Comment: Serial Number: [id number]:  [id number]       POC Glucose Once [[id number]]  (Normal) Collected: 11/30/20 1133    Specimen: Blood  "Updated: 11/30/20 1142     Glucose 87 mg/dL      Comment: Serial Number: [id number]:  [id number]       Tissue Pathology Exam [[id number]] Collected: 11/27/20 1117    Specimen: Tissue from Foot, Right Updated: 11/30/20 1054     Case Report --     Surgical Pathology Report                         Case: [id number]                                  Authorizing Provider:  MAX Pineda DPM      Collected:           11/27/2020 11:17 AM          Ordering Location:     [facility] MAIN  Received:            11/27/2020 01:08 PM                                 OR                                                                           Pathologist:           Agapito Meehan MD                                                           Specimen:    Foot, Right, right foot  cuboid bone biopsy                                                 Final Diagnosis --     Bone, right foot cuboid, biopsy:    Benign cancellous bone with no significant histopathology    Negative for acute osteomyelitis     Negative for malignancy     DOMENIC/tkd        Gross Description --     Received in a single container of formalin labeled \"Right foot cuboid bone biopsy\" are two whitish tan cores of bone, 0.5 and 1.0 cm in length with each having a diameter of nearly 2 mm. They are submitted as received following brief decalcification.     DOMENIC/tkd          No results found for: HGBA1C  Results from last 7 days   Lab Units 11/25/20  2341   INR  1.15*           No results found for: LIPASE  Lab Results   Component Value Date    CHOL 107 11/15/2019    CHLPL 110 07/29/2019    TRIG 56 11/15/2019    HDL 41 11/15/2019    LDL 55 11/15/2019       Lab Results   Lab Value Date/Time    FINALDX  11/27/2020 1117     Bone, right foot cuboid, biopsy:    Benign cancellous bone with no significant histopathology    Negative for acute osteomyelitis     Negative for malignancy     DOMENIC/tkd          Microbiology Results (last 10 days)     Procedure Component " Value - Date/Time    Blood Culture - Blood, Arm, Right [[id number]] Collected: 11/28/20 0717    Lab Status: Preliminary result Specimen: Blood from Arm, Right Updated: 12/01/20 0745     Blood Culture No growth at 3 days    Anaerobic Culture - Wound, Foot, Right [[id number]] Collected: 11/27/20 1114    Lab Status: Preliminary result Specimen: Wound from Foot, Right Updated: 11/30/20 0749     Anaerobic Culture Screening for Anaerobes    Wound Culture - Wound, Foot, Right [[id number]]  (Abnormal)  (Susceptibility) Collected: 11/27/20 1114    Lab Status: Final result Specimen: Wound from Foot, Right Updated: 11/29/20 0834     Wound Culture Moderate growth (3+) Staphylococcus aureus     Comment: Methicillin resistant Staphylococcus aureus, Patient may be an isolation risk.        Gram Stain Rare (1+) WBCs per low power field      Rare (1+) Gram positive cocci    Susceptibility      Staphylococcus aureus     ROBB     Clindamycin Susceptible     Erythromycin Susceptible     Inducible Clindamycin Resistance Negative     Oxacillin Susceptible     Penicillin G Resistant     Rifampin Susceptible     Tetracycline Susceptible     Trimethoprim + Sulfamethoxazole Susceptible     Vancomycin Susceptible                Susceptibility Comments     Staphylococcus aureus    This isolate does not demonstrate inducible clindamycin resistance in vitro.               COVID-19, [facility]BBOTT IN-HOUSE,NP Swab (NO TRANSPORT MEDIA) 2 HR TAT - Swab, Nasopharynx [[id number]]  (Normal) Collected: 11/25/20 1606    Lab Status: Final result Specimen: Swab from Nasopharynx Updated: 11/25/20 1640     COVID19 Not Detected    Narrative:      Fact sheet for providers: https://www.fda.gov/media/829255/download     Fact sheet for patients: https://www.fda.gov/media/132188/download    Blood Culture - Blood, Arm, Left [[id number]]  (Abnormal)  (Susceptibility) Collected: 11/25/20 1601    Lab Status: Final result Specimen: Blood from Arm, Left Updated: 11/28/20 0704      Blood Culture Staphylococcus aureus     Comment: Infectious disease consultation is highly recommended to rule out distant foci of infection.        Isolated from Aerobic and Anaerobic Bottles     Gram Stain Anaerobic Bottle Gram positive cocci in clusters      Aerobic Bottle Gram positive cocci in clusters    Susceptibility      Staphylococcus aureus     ROBB     Gentamicin Susceptible     Oxacillin Susceptible     Rifampin Susceptible     Vancomycin Susceptible                Susceptibility Comments     Staphylococcus aureus    This isolate does not demonstrate inducible clindamycin resistance in vitro.               Blood Culture - Blood, Hand, Right [[id number]]  (Abnormal) Collected: 11/25/20 1555    Lab Status: Final result Specimen: Blood from Hand, Right Updated: 11/28/20 0824     Blood Culture Staphylococcus aureus     Comment: Infectious disease consultation is highly recommended to rule out distant foci of infection.        Isolated from Anaerobic Bottle     Gram Stain Anaerobic Bottle Gram positive cocci in clusters    Narrative:      For MICs refer to blood culture Collected 11/25/2020 1601.           ECG/EMG Results (most recent)     Procedure Component Value Units Date/Time    ECG 12 Lead [[id number]] Collected: 11/25/20 1549     Updated: 11/25/20 1551     QT Interval 324 ms     Narrative:      HEART RATE= 119  bpm  RR Interval= 504  ms  GA Interval= 143  ms  P Horizontal Axis= 15  deg  P Front Axis= 71  deg  QRSD Interval= 98  ms  QT Interval= 324  ms  QRS Axis= 41  deg  T Wave Axis= 20  deg  - OTHERWISE NORMAL ECG -  Sinus tachycardia  When compared with ECG of 13-Aug-2013 10:27:12,  Significant rate increase  Electronically Signed By:   Date and Time of Study: 2020-11-25 15:49:38    ECG 12 Lead [[id number]] Collected: 11/26/20 1955     Updated: 11/27/20 0913     QT Interval 326 ms     Narrative:      HEART RATE= 116  bpm  RR Interval= 516  ms  GA Interval= 137  ms  P Horizontal Axis= -12  deg  P Front  Axis= -10  deg  QRSD Interval= 102  ms  QT Interval= 326  ms  QRS Axis= 20  deg  T Wave Axis= 55  deg  - OTHERWISE NORMAL ECG -  Sinus tachycardia  When compared with ECG of 25-Nov-2020 15:49:38,  No significant change  Electronically Signed By: Joesph Michele ([facility]) 27-Nov-2020 09:07:43  Date and Time of Study: 2020-11-26 19:55:23    Adult Transthoracic Echo Complete W/ Cont if Necessary Per Protocol [[id number]] Collected: 11/28/20 0740     Updated: 11/28/20 1456     BSA 2.5 m^2      RVIDd 2.6 cm      IVSd 1.1 cm      IVSs 1.7 cm      LVIDd 5.6 cm      LVIDs 2.9 cm      LVPWd 1.1 cm       CV ECHO KAELYN - LVPWS 1.5 cm      IVS/LVPW 0.93     FS 48.7 %      EDV(Teich) 156.6 ml      ESV(Teich) 32.1 ml      EF(Teich) 79.5 %      EDV(cubed) 180.0 ml      ESV(cubed) 24.3 ml      EF(cubed) 86.5 %      % IVS thick 60.8 %      % LVPW thick 28.8 %      LV mass(C)d 253.1 grams      LV mass(C)dI 101.1 grams/m^2      LV mass(C)s 166.6 grams      LV mass(C)sI 66.5 grams/m^2      SV(Teich) 124.5 ml      SI(Teich) 49.7 ml/m^2      SV(cubed) 155.8 ml      SI(cubed) 62.2 ml/m^2      Ao root diam 3.2 cm      Ao root area 8.0 cm^2      ACS 1.9 cm      asc Aorta Diam 3.4 cm      LVOT diam 2.0 cm      LVOT area 3.0 cm^2      EDV(MOD-sp4) 120.7 ml      ESV(MOD-sp4) 48.0 ml      EF(MOD-sp4) 60.2 %      SV(MOD-sp4) 72.7 ml      SI(MOD-sp4) 29.0 ml/m^2      Ao root area (BSA corrected) 1.3     LV Hanna Vol (BSA corrected) 48.2 ml/m^2      LV Sys Vol (BSA corrected) 19.2 ml/m^2      MV E max jovita 61.0 cm/sec      MV A max jovita 65.8 cm/sec      MV E/A 0.93     MV V2 max 149.2 cm/sec      MV max PG 8.9 mmHg      MV V2 mean 79.1 cm/sec      MV mean PG 3.0 mmHg      MV V2 VTI 27.9 cm      MVA(VTI) 2.6 cm^2      MV dec slope 763.2 cm/sec^2      MV dec time 0.16 sec      Ao pk jovita 149.7 cm/sec      Ao max PG 9.0 mmHg      Ao max PG (full) 3.0 mmHg      Ao V2 mean 109.2 cm/sec      Ao mean PG 5.2 mmHg      Ao mean PG (full) 1.7 mmHg      Ao V2 VTI 29.1  cm      OLIVER(I,A) 2.5 cm^2      OLIVER(I,D) 2.5 cm^2      OLIVER(V,A) 2.5 cm^2      OLIVER(V,D) 2.5 cm^2      LV V1 max PG 6.0 mmHg      LV V1 mean PG 3.6 mmHg      LV V1 max 122.3 cm/sec      LV V1 mean 90.4 cm/sec      LV V1 VTI 24.4 cm      MR max jovita 493.5 cm/sec      MR max PG 97.4 mmHg      SV(Ao) 233.6 ml      SI(Ao) 93.3 ml/m^2      SV(LVOT) 73.4 ml      SI(LVOT) 29.3 ml/m^2      PA V2 max 96.5 cm/sec      PA max PG 3.7 mmHg      PA max PG (full) 1.7 mmHg      PA V2 mean 68.9 cm/sec      PA mean PG 2.1 mmHg      PA mean PG (full) 0.98 mmHg      PA V2 VTI 18.8 cm      PA acc time 0.11 sec      RV V1 max PG 2.1 mmHg      RV V1 mean PG 1.1 mmHg      RV V1 max 71.6 cm/sec      RV V1 mean 50.2 cm/sec      RV V1 VTI 14.2 cm      PA pr(Accel) 27.6 mmHg       CV ECHO KAELYN - BZI_BMI 37.7 kilograms/m^2       CV ECHO KAELYN - BSA(HAYCOCK) 2.6 m^2       CV ECHO KAELYN - BZI_METRIC_WEIGHT 129.7 kg       CV ECHO KAELYN - BZI_METRIC_HEIGHT 185.4 cm      EF(MOD-bp) 60.0 %      LA dimension(2D) 4.3 cm     Narrative:      · Estimated left ventricular EF was in agreement with the calculated left   ventricular EF. Left ventricular ejection fraction appears to be 56 - 60%.   Left ventricular systolic function is normal.  · Left ventricular diastolic function was normal.       ECG 12 Lead [[id number]] Resulted: 11/28/20 1547     Updated: 11/28/20 1547          Results for orders placed during the hospital encounter of 11/25/20   Duplex Venous Lower Extremity - Bilateral CAR    Narrative · Acute right lower extremity superficial thrombophlebitis noted in the   greater saphenous (above knee).  · All other veins appeared normal bilaterally.          Results for orders placed during the hospital encounter of 11/25/20   Adult Transthoracic Echo Complete W/ Cont if Necessary Per Protocol    Narrative · Estimated left ventricular EF was in agreement with the calculated left   ventricular EF. Left ventricular ejection fraction appears to be 56 -  60%.   Left ventricular systolic function is normal.  · Left ventricular diastolic function was normal.          Xr Foot 3+ View Right    Result Date: 11/25/2020  1. Redemonstration of destructive changes in the midfoot related to chronic Charcot arthropathy similar to prior study. 2. No definite new area of osseous erosion or fracture. 3. Soft tissue swelling most pronounced involving the plantar aspect of the midfoot.  Electronically Signed By-Jermaine Hayes MD On:11/25/2020 4:28 PM This report was finalized on [id number] by  Jermaine Hayes MD.    Xr Chest 1 View    Result Date: 11/25/2020  No acute cardiopulmonary process.  Electronically Signed By-Savannah Wliliam MD On:11/25/2020 4:21 PM This report was finalized on [id number] by  Savannah iWlliam MD.    Ct Chest Pulmonary Embolism    Result Date: 11/25/2020  1. Negative for pulmonary embolus. 2. No acute intrathoracic process. 3. Extensive coronary atherosclerotic disease.  Electronically Signed By-Jermaine Hayes MD On:11/25/2020 7:24 PM This report was finalized on [id number] by  Jermaine Hayes MD.          Xrays, labs reviewed personally by physician.    Medication Review:   I have reviewed the patient's current medication list      Scheduled Meds  [MAR Hold] atorvastatin, 40 mg, Oral, Daily  cefTRIAXone, 2 g, Intravenous, Q24H  famotidine, 20 mg, Oral, Daily  [MAR Hold] insulin glargine, 152 Units, Subcutaneous, QAM  [MAR Hold] insulin lispro, 0-14 Units, Subcutaneous, TID AC  losartan, 100 mg, Oral, Daily  metoprolol succinate XL, 50 mg, Oral, Daily  [MAR Hold] pantoprazole, 40 mg, Oral, QAM AC  [MAR Hold] sodium chloride, 10 mL, Intravenous, Q12H        Meds Infusions  sodium chloride, 75 mL/hr, Last Rate: Stopped (11/27/20 1357)        Meds PRN  •  [MAR Hold] acetaminophen **OR** [MAR Hold] acetaminophen **OR** [MAR Hold] acetaminophen  •  [MAR Hold] aluminum-magnesium hydroxide-simethicone  •  [MAR Hold] dextrose  •  [MAR Hold] dextrose  •  [MAR Hold]  glucagon (human recombinant)  •  [MAR Hold] insulin lispro **AND** [MAR Hold] insulin lispro  •  [MAR Hold] magnesium sulfate **OR** [MAR Hold] magnesium sulfate in D5W 1g/100mL (PREMIX)  •  [MAR Hold] melatonin  •  [MAR Hold] nitroglycerin  •  [MAR Hold] ondansetron **OR** [MAR Hold] ondansetron  •  [MAR Hold] potassium chloride  •  [MAR Hold] sodium chloride  •  [COMPLETED] Insert peripheral IV **AND** [MAR Hold] sodium chloride  •  [MAR Hold] sodium chloride        Assessment/Plan   Assessment/Plan     Active Hospital Problems    Diagnosis  POA   • **Cellulitis of right foot [L03.115]  Yes   • Elevated d-dimer [R79.89]  Yes   • Obesity (BMI 30-39.9) [E66.9]  Yes   • Hyperlipidemia [E78.5]  Yes   • Charcot foot due to diabetes mellitus (CMS/HCC) [E11.610]  Yes   • Type II diabetes mellitus (CMS/HCC) [E11.9]  Yes   • GERD (gastroesophageal reflux disease) [K21.9]  Yes   • Essential hypertension [I10]  Yes   • Infected diabetic foot ulcer (CMS/HCC) [E11.621, L97.509]  Yes   • NAFLD (nonalcoholic fatty liver disease) [K76.0]  Yes      Resolved Hospital Problems   No resolved problems to display.        Acute  Infected R foot diabetic ulcer/abscess       -mri of the foot on 11/26/2020 with finding of fluid collection/abscess with cellulitis  No definite finding of osteomyelitis.       -underwent I&D, and debridement of  right foot abscess with biopsy on 11/27/2020 by Dr Pineda        -blood cultures on 11/25/2020 grew methicillin susceptible Staph aureus and wound culture on 11/27/2020 also grew moderate MRSA         -currently on Rocephin      Staphylococcus bacteremia          -on Rocephin, and 2D echo on 11/28/2020 showed preserved LV function with estimated EF of 60%           -AISHA  scheduled for today        ORALIA/ATN       -Improving, in the setting of bacteremia,        -avoid nephrotoxic meds, and monitor renal function closely             . History of Charcot  foot       Diabetes mellitus type  2        -Lantus/SSI       Hypertension        -Losartan/Toprol-XL and Nitropaste       GERD-PPI       History of nonalcoholic fatty disease       Morbid obesity, BMI of 39       Hyperlipidemia         -Lipitor      Disposition: continue current hospital course and follow-up with podiatrist         MEDICAL DECISION MAKING COMPLEXITY BY PROBLEM:       VTE Prophylaxis -   Mechanical Order History:      Ordered        11/25/20 2204  Place Sequential Compression Device  Once         11/25/20 2204  Maintain Sequential Compression Device  Continuous                 Pharmalogical Order History:     None            Code Status -   Code Status and Medical Interventions:   Ordered at: 11/25/20 2103     Code Status:    CPR     Medical Interventions (Level of Support Prior to Arrest):    Full         Discharge Planning      Electronically signed by Scar Patino MD, 12/01/20, 09:45 EST.  [facility] Hospitalist Team

## 2020-12-01 NOTE — PLAN OF CARE
Goal Outcome Evaluation:  Plan of Care Reviewed With: patient  Progress: no change   Pt had AISHA, picc line established, education completed. Pt to d/c home

## 2020-12-01 NOTE — PLAN OF CARE
Goal Outcome Evaluation:  Plan of Care Reviewed With: patient  Progress: no change  Outcome Summary: Pt up ad [place] with scooter. VSS, besides bump in temp. treated with tylenol. NPO for AISHA this afternoon. Pt expressed concern over swelling in left arm. I will pass along to dayshift to ask MD about.

## 2020-12-01 NOTE — NURSING NOTE
Pt is running fever. Meds are still on hold from surgery that was scheduled Monday. Surgery was cancelled and dayshift nurse said she had messaged Dr. Temple to unhold the MAR medications. Pt was not due anything that was on hold until fever spiked. Spoke with NP Skip Bush, who gave me one time doses of tylenol and melatonin (which pt requested) until surgeon is able to release hold on meds

## 2020-12-01 NOTE — ANESTHESIA POSTPROCEDURE EVALUATION
Patient: Agapito Canseco    Procedure Summary     Date: 12/01/20 Room / Location: [facility]    Anesthesia Start: 1433 Anesthesia Stop: 1453    Procedure: ADULT TRANSESOPHAGEAL ECHO (AISHA) W/ CONT IF NECESSARY PER PROTOCOL Diagnosis: (Endocarditis)    Scheduled Providers: Asa Coates MD Provider: Tico Knight MD    Anesthesia Type: general ASA Status: 3          Anesthesia Type: general    Vitals  Vitals Value Taken Time   /58 12/01/20 1452   Temp     Pulse 62 12/01/20 1452   Resp 20 12/01/20 1452   SpO2 100 % 12/01/20 1452           Post Anesthesia Care and Evaluation    Patient location during evaluation: bedside  Patient participation: complete - patient participated  Level of consciousness: awake and alert  Pain score: 1  Pain management: adequate  Airway patency: patent  Anesthetic complications: No anesthetic complications  PONV Status: none  Cardiovascular status: acceptable  Respiratory status: acceptable  Hydration status: acceptable  Post Neuraxial Block status: Motor and sensory function returned to baseline

## 2020-12-01 NOTE — ANESTHESIA PREPROCEDURE EVALUATION
Anesthesia Evaluation     Patient summary reviewed and Nursing notes reviewed   NPO Solid Status: > 6 hours  NPO Liquid Status: > 6 hours           Airway   Mallampati: II  TM distance: >3 FB  Neck ROM: full  No difficulty expected  Dental - normal exam     Pulmonary - negative pulmonary ROS and normal exam    breath sounds clear to auscultation  Cardiovascular - normal exam    ECG reviewed  Rhythm: regular  Rate: normal    (+) hypertension, hyperlipidemia,       Neuro/Psych  (+) numbness,     GI/Hepatic/Renal/Endo    (+) obesity, morbid obesity, GERD,  liver disease, diabetes mellitus,     Musculoskeletal     Abdominal  - normal exam    Abdomen: soft.  Bowel sounds: normal.   Substance History - negative use     OB/GYN negative ob/gyn ROS         Other   arthritis,                      Anesthesia Plan    ASA 3     general     intravenous induction     Anesthetic plan, all risks, benefits, and alternatives have been provided, discussed and informed consent has been obtained with: patient.  Use of blood products discussed with patient  Consented to blood products.

## 2020-12-01 NOTE — CONSULTS
Single lumen power picc placed in right upper arm-tip placement confirmed by sapiens ecg. Lpn, magaly notified ok to use.

## 2020-12-02 ENCOUNTER — READMISSION MANAGEMENT (OUTPATIENT)
Dept: CALL CENTER | Facility: HOSPITAL | Age: 60
End: 2020-12-02

## 2020-12-02 DIAGNOSIS — L97.511 DIABETIC ULCER OF OTHER PART OF RIGHT FOOT ASSOCIATED WITH DIABETES MELLITUS DUE TO UNDERLYING CONDITION, LIMITED TO BREAKDOWN OF SKIN (HCC): Primary | ICD-10-CM

## 2020-12-02 DIAGNOSIS — E08.621 DIABETIC ULCER OF OTHER PART OF RIGHT FOOT ASSOCIATED WITH DIABETES MELLITUS DUE TO UNDERLYING CONDITION, LIMITED TO BREAKDOWN OF SKIN (HCC): Primary | ICD-10-CM

## 2020-12-02 LAB — BACTERIA SPEC ANAEROBE CULT: NORMAL

## 2020-12-02 NOTE — PROGRESS NOTES
Unable to see the patient today because he was down having a AISHA.  Discussed case with podiatry.  If AISHA is negative for vegetation patient can be discharged on 6 weeks of IV Rocephin 2 g every 24 hours.  We will enter ambulatory care orders for PICC lab care and weekly labs.  Weekly labs CBC/creatinine/sed rate.    Please fax all post discharge lab results, imaging studies and correspondence to this fax number [phone]  For any question or concern please contact our service number [phone]

## 2020-12-02 NOTE — OUTREACH NOTE
Prep Survey      Responses   [facility] facility patient discharged from?  [facility]   Is LACE score < 7 ?  No   Eligibility  Readm Mgmt   Discharge diagnosis  Cellulitis of right foot   Does the patient have one of the following disease processes/diagnoses(primary or secondary)?  General Surgery   Does the patient have Home health ordered?  No   Is there a DME ordered?  No   General alerts for this patient  [facility] for IV ABX    Prep survey completed?  Yes          Urmila Baron RN

## 2020-12-03 ENCOUNTER — APPOINTMENT (OUTPATIENT)
Dept: WOUND CARE | Facility: HOSPITAL | Age: 60
End: 2020-12-03

## 2020-12-03 LAB — BACTERIA SPEC AEROBE CULT: NORMAL

## 2020-12-04 ENCOUNTER — READMISSION MANAGEMENT (OUTPATIENT)
Dept: CALL CENTER | Facility: HOSPITAL | Age: 60
End: 2020-12-04

## 2020-12-04 NOTE — OUTREACH NOTE
General Surgery Week 1 Survey      Responses   [facility] patient discharged from?  [facility]   Does the patient have one of the following disease processes/diagnoses(primary or secondary)?  General Surgery   Week 1 attempt successful?  Yes   Call start time  1025   Call end time  1027   General alerts for this patient  Option care for IV ABX    Discharge diagnosis  Cellulitis of right foot   Meds reviewed with patient/caregiver?  Yes   Is the patient having any side effects they believe may be caused by any medication additions or changes?  No   Does the patient have all medications related to this admission filled (includes all antibiotics, pain medications, etc.)  Yes   Is the patient taking all medications as directed (includes completed medication regime)?  Yes   Does the patient have a follow up appointment scheduled with their surgeon?  Yes   Has the patient kept scheduled appointments due by today?  Yes   Has home health visited the patient within 72 hours of discharge?  N/A   Psychosocial issues?  No   Did the patient receive a copy of their discharge instructions?  Yes   Nursing interventions  Reviewed instructions with patient   What is the patient's perception of their health status since discharge?  Improving   Nursing interventions  Nurse provided patient education   Is the patient /caregiver able to teach back basic post-op care?  No tub bath, swimming, or hot tub until instructed by MD, Continue use of incentive spirometry at least 1 week post discharge, Lifting as instructed by MD in discharge instructions, Keep incision areas clean,dry and protected   Is the patient/caregiver able to teach back signs and symptoms of incisional infection?  Pus or odor from incision, Increased redness, swelling or pain at the incisonal site, Increased drainage or bleeding, Fever, Incisional warmth   Is the patient/caregiver able to teach back steps to recovery at home?  Set small, achievable goals for return to  baseline health, Rest and rebuild strength, gradually increase activity   Is the patient/caregiver able to teach back the hierarchy of who to call/visit for symptoms/problems? PCP, Specialist, Home health nurse, Urgent Care, ED, 911  Yes   Week 1 call completed?  Yes          Agapito Dick RN

## 2020-12-07 ENCOUNTER — HOSPITAL ENCOUNTER (OUTPATIENT)
Dept: INFUSION THERAPY | Facility: HOSPITAL | Age: 60
Setting detail: INFUSION SERIES
Discharge: HOME | End: 2020-12-07

## 2020-12-07 DIAGNOSIS — L97.511 DIABETIC ULCER OF OTHER PART OF RIGHT FOOT ASSOCIATED WITH DIABETES MELLITUS DUE TO UNDERLYING CONDITION, LIMITED TO BREAKDOWN OF SKIN (HCC): Primary | ICD-10-CM

## 2020-12-07 DIAGNOSIS — E11.621 DIABETIC ULCER OF OTHER PART OF RIGHT FOOT ASSOCIATED WITH TYPE 2 DIABETES MELLITUS, WITH BONE INVOLVEMENT WITHOUT EVIDENCE OF NECROSIS (HCC): ICD-10-CM

## 2020-12-07 DIAGNOSIS — E08.621 DIABETIC ULCER OF OTHER PART OF RIGHT FOOT ASSOCIATED WITH DIABETES MELLITUS DUE TO UNDERLYING CONDITION, LIMITED TO BREAKDOWN OF SKIN (HCC): Primary | ICD-10-CM

## 2020-12-07 DIAGNOSIS — L97.516 DIABETIC ULCER OF OTHER PART OF RIGHT FOOT ASSOCIATED WITH TYPE 2 DIABETES MELLITUS, WITH BONE INVOLVEMENT WITHOUT EVIDENCE OF NECROSIS (HCC): ICD-10-CM

## 2020-12-07 LAB
CREAT SERPL-MCNC: 1.3 MG/DL (ref 0.76–1.27)
DEPRECATED RDW RBC AUTO: 42.4 FL (ref 37–54)
EOSINOPHIL # BLD MANUAL: 0.19 10*3/MM3 (ref 0–0.4)
EOSINOPHIL NFR BLD MANUAL: 2 % (ref 0.3–6.2)
ERYTHROCYTE [DISTWIDTH] IN BLOOD BY AUTOMATED COUNT: 13.8 % (ref 12.3–15.4)
ERYTHROCYTE [SEDIMENTATION RATE] IN BLOOD: 77 MM/HR (ref 0–20)
GFR SERPL CREATININE-BSD FRML MDRD: 57 ML/MIN/1.73
HCT VFR BLD AUTO: 35.2 % (ref 37.5–51)
HGB BLD-MCNC: 11.9 G/DL (ref 13–17.7)
LYMPHOCYTES # BLD MANUAL: 1.4 10*3/MM3 (ref 0.7–3.1)
LYMPHOCYTES NFR BLD MANUAL: 15 % (ref 19.6–45.3)
LYMPHOCYTES NFR BLD MANUAL: 8 % (ref 5–12)
MCH RBC QN AUTO: 30 PG (ref 26.6–33)
MCHC RBC AUTO-ENTMCNC: 33.8 G/DL (ref 31.5–35.7)
MCV RBC AUTO: 88.9 FL (ref 79–97)
MONOCYTES # BLD AUTO: 0.74 10*3/MM3 (ref 0.1–0.9)
NEUTROPHILS # BLD AUTO: 6.88 10*3/MM3 (ref 1.7–7)
NEUTROPHILS NFR BLD MANUAL: 73 % (ref 42.7–76)
NEUTS BAND NFR BLD MANUAL: 1 % (ref 0–5)
PLATELET # BLD AUTO: 325 10*3/MM3 (ref 140–450)
PMV BLD AUTO: 8.2 FL (ref 6–12)
RBC # BLD AUTO: 3.96 10*6/MM3 (ref 4.14–5.8)
RBC MORPH BLD: NORMAL
SCAN SLIDE: NORMAL
SMALL PLATELETS BLD QL SMEAR: ADEQUATE
VARIANT LYMPHS NFR BLD MANUAL: 1 % (ref 0–5)
WBC # BLD AUTO: 9.3 10*3/MM3 (ref 3.4–10.8)
WBC MORPH BLD: NORMAL

## 2020-12-07 PROCEDURE — 85025 COMPLETE CBC W/AUTO DIFF WBC: CPT | Performed by: NURSE PRACTITIONER

## 2020-12-07 PROCEDURE — 85651 RBC SED RATE NONAUTOMATED: CPT | Performed by: NURSE PRACTITIONER

## 2020-12-07 PROCEDURE — 82565 ASSAY OF CREATININE: CPT | Performed by: NURSE PRACTITIONER

## 2020-12-07 PROCEDURE — 85007 BL SMEAR W/DIFF WBC COUNT: CPT | Performed by: NURSE PRACTITIONER

## 2020-12-07 PROCEDURE — 36592 COLLECT BLOOD FROM PICC: CPT

## 2020-12-14 ENCOUNTER — HOSPITAL ENCOUNTER (OUTPATIENT)
Dept: INFUSION THERAPY | Facility: HOSPITAL | Age: 60
Setting detail: INFUSION SERIES
Discharge: HOME | End: 2020-12-14

## 2020-12-14 DIAGNOSIS — E08.621 DIABETIC ULCER OF OTHER PART OF RIGHT FOOT ASSOCIATED WITH DIABETES MELLITUS DUE TO UNDERLYING CONDITION, LIMITED TO BREAKDOWN OF SKIN (HCC): ICD-10-CM

## 2020-12-14 DIAGNOSIS — L97.511 DIABETIC ULCER OF OTHER PART OF RIGHT FOOT ASSOCIATED WITH DIABETES MELLITUS DUE TO UNDERLYING CONDITION, LIMITED TO BREAKDOWN OF SKIN (HCC): ICD-10-CM

## 2020-12-14 LAB
BASOPHILS # BLD AUTO: 0.2 10*3/MM3 (ref 0–0.2)
BASOPHILS NFR BLD AUTO: 1.9 % (ref 0–1.5)
CREAT SERPL-MCNC: 1.77 MG/DL (ref 0.76–1.27)
DEPRECATED RDW RBC AUTO: 44.6 FL (ref 37–54)
EOSINOPHIL # BLD AUTO: 0.5 10*3/MM3 (ref 0–0.4)
EOSINOPHIL NFR BLD AUTO: 6.1 % (ref 0.3–6.2)
ERYTHROCYTE [DISTWIDTH] IN BLOOD BY AUTOMATED COUNT: 14.3 % (ref 12.3–15.4)
ERYTHROCYTE [SEDIMENTATION RATE] IN BLOOD: 52 MM/HR (ref 0–20)
GFR SERPL CREATININE-BSD FRML MDRD: 40 ML/MIN/1.73
HCT VFR BLD AUTO: 37 % (ref 37.5–51)
HGB BLD-MCNC: 12.3 G/DL (ref 13–17.7)
LYMPHOCYTES # BLD AUTO: 2 10*3/MM3 (ref 0.7–3.1)
LYMPHOCYTES NFR BLD AUTO: 24.4 % (ref 19.6–45.3)
MCH RBC QN AUTO: 29.4 PG (ref 26.6–33)
MCHC RBC AUTO-ENTMCNC: 33.3 G/DL (ref 31.5–35.7)
MCV RBC AUTO: 88.4 FL (ref 79–97)
MONOCYTES # BLD AUTO: 0.7 10*3/MM3 (ref 0.1–0.9)
MONOCYTES NFR BLD AUTO: 8.9 % (ref 5–12)
NEUTROPHILS NFR BLD AUTO: 4.8 10*3/MM3 (ref 1.7–7)
NEUTROPHILS NFR BLD AUTO: 58.7 % (ref 42.7–76)
NRBC BLD AUTO-RTO: 0 /100 WBC (ref 0–0.2)
PLATELET # BLD AUTO: 266 10*3/MM3 (ref 140–450)
PMV BLD AUTO: 8.9 FL (ref 6–12)
RBC # BLD AUTO: 4.19 10*6/MM3 (ref 4.14–5.8)
WBC # BLD AUTO: 8.2 10*3/MM3 (ref 3.4–10.8)

## 2020-12-14 PROCEDURE — 85025 COMPLETE CBC W/AUTO DIFF WBC: CPT | Performed by: NURSE PRACTITIONER

## 2020-12-14 PROCEDURE — 85651 RBC SED RATE NONAUTOMATED: CPT | Performed by: NURSE PRACTITIONER

## 2020-12-14 PROCEDURE — 36592 COLLECT BLOOD FROM PICC: CPT

## 2020-12-14 PROCEDURE — 82565 ASSAY OF CREATININE: CPT | Performed by: NURSE PRACTITIONER

## 2020-12-21 ENCOUNTER — HOSPITAL ENCOUNTER (OUTPATIENT)
Dept: INFUSION THERAPY | Facility: HOSPITAL | Age: 60
Setting detail: INFUSION SERIES
Discharge: HOME | End: 2020-12-21

## 2020-12-21 DIAGNOSIS — E08.621 DIABETIC ULCER OF OTHER PART OF RIGHT FOOT ASSOCIATED WITH DIABETES MELLITUS DUE TO UNDERLYING CONDITION, LIMITED TO BREAKDOWN OF SKIN (HCC): ICD-10-CM

## 2020-12-21 DIAGNOSIS — L97.511 DIABETIC ULCER OF OTHER PART OF RIGHT FOOT ASSOCIATED WITH DIABETES MELLITUS DUE TO UNDERLYING CONDITION, LIMITED TO BREAKDOWN OF SKIN (HCC): ICD-10-CM

## 2020-12-21 LAB
BASOPHILS # BLD AUTO: 0.1 10*3/MM3 (ref 0–0.2)
BASOPHILS NFR BLD AUTO: 1.4 % (ref 0–1.5)
CREAT SERPL-MCNC: 1.49 MG/DL (ref 0.76–1.27)
DEPRECATED RDW RBC AUTO: 43.3 FL (ref 37–54)
EOSINOPHIL # BLD AUTO: 0.5 10*3/MM3 (ref 0–0.4)
EOSINOPHIL NFR BLD AUTO: 7.3 % (ref 0.3–6.2)
ERYTHROCYTE [DISTWIDTH] IN BLOOD BY AUTOMATED COUNT: 14.1 % (ref 12.3–15.4)
ERYTHROCYTE [SEDIMENTATION RATE] IN BLOOD: 30 MM/HR (ref 0–20)
GFR SERPL CREATININE-BSD FRML MDRD: 48 ML/MIN/1.73
HCT VFR BLD AUTO: 38.1 % (ref 37.5–51)
HGB BLD-MCNC: 12.7 G/DL (ref 13–17.7)
LYMPHOCYTES # BLD AUTO: 1.6 10*3/MM3 (ref 0.7–3.1)
LYMPHOCYTES NFR BLD AUTO: 21.7 % (ref 19.6–45.3)
MCH RBC QN AUTO: 29.4 PG (ref 26.6–33)
MCHC RBC AUTO-ENTMCNC: 33.3 G/DL (ref 31.5–35.7)
MCV RBC AUTO: 88.3 FL (ref 79–97)
MONOCYTES # BLD AUTO: 0.6 10*3/MM3 (ref 0.1–0.9)
MONOCYTES NFR BLD AUTO: 8.2 % (ref 5–12)
NEUTROPHILS NFR BLD AUTO: 4.5 10*3/MM3 (ref 1.7–7)
NEUTROPHILS NFR BLD AUTO: 61.4 % (ref 42.7–76)
NRBC BLD AUTO-RTO: 0.1 /100 WBC (ref 0–0.2)
PLATELET # BLD AUTO: 213 10*3/MM3 (ref 140–450)
PMV BLD AUTO: 9.4 FL (ref 6–12)
RBC # BLD AUTO: 4.32 10*6/MM3 (ref 4.14–5.8)
WBC # BLD AUTO: 7.4 10*3/MM3 (ref 3.4–10.8)

## 2020-12-21 PROCEDURE — 36592 COLLECT BLOOD FROM PICC: CPT

## 2020-12-21 PROCEDURE — 85025 COMPLETE CBC W/AUTO DIFF WBC: CPT | Performed by: NURSE PRACTITIONER

## 2020-12-21 PROCEDURE — 85651 RBC SED RATE NONAUTOMATED: CPT | Performed by: NURSE PRACTITIONER

## 2020-12-21 PROCEDURE — 82565 ASSAY OF CREATININE: CPT | Performed by: NURSE PRACTITIONER

## 2020-12-22 ENCOUNTER — READMISSION MANAGEMENT (OUTPATIENT)
Dept: CALL CENTER | Facility: HOSPITAL | Age: 60
End: 2020-12-22

## 2020-12-22 NOTE — OUTREACH NOTE
General Surgery Week 3 Survey      Responses   [facility] patient discharged from?  [facility]   Does the patient have one of the following disease processes/diagnoses(primary or secondary)?  General Surgery   Week 3 attempt successful?  Yes   Call start time  0727   Call end time  0729   Meds reviewed with patient/caregiver?  Yes   Is the patient taking all medications as directed (includes completed medication regime)?  Yes   Has the patient kept scheduled appointments due by today?  Yes   What is the patient's perception of their health status since discharge?  Improving   Week 3 call completed?  Yes   Wrap up additional comments  Doing well, still going for IV antibiotics.  No problems or questions at this time.          Sully Armendariz, RN

## 2020-12-28 ENCOUNTER — HOSPITAL ENCOUNTER (OUTPATIENT)
Dept: INFUSION THERAPY | Facility: HOSPITAL | Age: 60
Setting detail: INFUSION SERIES
Discharge: HOME | End: 2020-12-28

## 2020-12-28 VITALS — TEMPERATURE: 96.2 F

## 2020-12-28 DIAGNOSIS — E08.621 DIABETIC ULCER OF OTHER PART OF RIGHT FOOT ASSOCIATED WITH DIABETES MELLITUS DUE TO UNDERLYING CONDITION, LIMITED TO BREAKDOWN OF SKIN (HCC): ICD-10-CM

## 2020-12-28 DIAGNOSIS — L97.511 DIABETIC ULCER OF OTHER PART OF RIGHT FOOT ASSOCIATED WITH DIABETES MELLITUS DUE TO UNDERLYING CONDITION, LIMITED TO BREAKDOWN OF SKIN (HCC): ICD-10-CM

## 2020-12-28 LAB
BASOPHILS # BLD AUTO: 0.1 10*3/MM3 (ref 0–0.2)
BASOPHILS NFR BLD AUTO: 1.2 % (ref 0–1.5)
CREAT SERPL-MCNC: 1.48 MG/DL (ref 0.76–1.27)
DEPRECATED RDW RBC AUTO: 43.3 FL (ref 37–54)
EOSINOPHIL # BLD AUTO: 0.7 10*3/MM3 (ref 0–0.4)
EOSINOPHIL NFR BLD AUTO: 9.5 % (ref 0.3–6.2)
ERYTHROCYTE [DISTWIDTH] IN BLOOD BY AUTOMATED COUNT: 14 % (ref 12.3–15.4)
ERYTHROCYTE [SEDIMENTATION RATE] IN BLOOD: 45 MM/HR (ref 0–20)
GFR SERPL CREATININE-BSD FRML MDRD: 49 ML/MIN/1.73
HCT VFR BLD AUTO: 36.9 % (ref 37.5–51)
HGB BLD-MCNC: 12.5 G/DL (ref 13–17.7)
LYMPHOCYTES # BLD AUTO: 1.5 10*3/MM3 (ref 0.7–3.1)
LYMPHOCYTES NFR BLD AUTO: 21.1 % (ref 19.6–45.3)
MCH RBC QN AUTO: 30 PG (ref 26.6–33)
MCHC RBC AUTO-ENTMCNC: 33.8 G/DL (ref 31.5–35.7)
MCV RBC AUTO: 88.9 FL (ref 79–97)
MONOCYTES # BLD AUTO: 0.7 10*3/MM3 (ref 0.1–0.9)
MONOCYTES NFR BLD AUTO: 8.9 % (ref 5–12)
NEUTROPHILS NFR BLD AUTO: 4.3 10*3/MM3 (ref 1.7–7)
NEUTROPHILS NFR BLD AUTO: 59.3 % (ref 42.7–76)
NRBC BLD AUTO-RTO: 0.1 /100 WBC (ref 0–0.2)
PLATELET # BLD AUTO: 165 10*3/MM3 (ref 140–450)
PMV BLD AUTO: 9.7 FL (ref 6–12)
RBC # BLD AUTO: 4.15 10*6/MM3 (ref 4.14–5.8)
WBC # BLD AUTO: 7.3 10*3/MM3 (ref 3.4–10.8)

## 2020-12-28 PROCEDURE — 85651 RBC SED RATE NONAUTOMATED: CPT | Performed by: NURSE PRACTITIONER

## 2020-12-28 PROCEDURE — 82565 ASSAY OF CREATININE: CPT | Performed by: NURSE PRACTITIONER

## 2020-12-28 PROCEDURE — 85025 COMPLETE CBC W/AUTO DIFF WBC: CPT | Performed by: NURSE PRACTITIONER

## 2020-12-28 PROCEDURE — 36592 COLLECT BLOOD FROM PICC: CPT

## 2020-12-30 ENCOUNTER — OFFICE VISIT (OUTPATIENT)
Dept: WOUND CARE | Facility: HOSPITAL | Age: 60
End: 2020-12-30

## 2020-12-30 DIAGNOSIS — E11.42 TYPE 2 DIABETES MELLITUS WITH DIABETIC POLYNEUROPATHY, UNSPECIFIED WHETHER LONG TERM INSULIN USE (HCC): Chronic | ICD-10-CM

## 2020-12-30 DIAGNOSIS — M14.671 CHARCOT'S JOINT, RIGHT ANKLE AND FOOT: ICD-10-CM

## 2020-12-30 DIAGNOSIS — L97.513 NON-PRESSURE CHRONIC ULCER OF OTHER PART OF RIGHT FOOT WITH NECROSIS OF MUSCLE (HCC): ICD-10-CM

## 2020-12-30 PROCEDURE — 99213 OFFICE O/P EST LOW 20 MIN: CPT | Performed by: PODIATRIST

## 2020-12-30 PROCEDURE — G0463 HOSPITAL OUTPT CLINIC VISIT: HCPCS

## 2021-01-04 ENCOUNTER — HOSPITAL ENCOUNTER (OUTPATIENT)
Dept: INFUSION THERAPY | Facility: HOSPITAL | Age: 61
Setting detail: INFUSION SERIES
Discharge: HOME | End: 2021-01-04

## 2021-01-04 VITALS — TEMPERATURE: 97.7 F

## 2021-01-04 DIAGNOSIS — L97.511 DIABETIC ULCER OF OTHER PART OF RIGHT FOOT ASSOCIATED WITH DIABETES MELLITUS DUE TO UNDERLYING CONDITION, LIMITED TO BREAKDOWN OF SKIN (HCC): ICD-10-CM

## 2021-01-04 DIAGNOSIS — E08.621 DIABETIC ULCER OF OTHER PART OF RIGHT FOOT ASSOCIATED WITH DIABETES MELLITUS DUE TO UNDERLYING CONDITION, LIMITED TO BREAKDOWN OF SKIN (HCC): ICD-10-CM

## 2021-01-04 LAB
BASOPHILS # BLD AUTO: 0.1 10*3/MM3 (ref 0–0.2)
BASOPHILS NFR BLD AUTO: 1.4 % (ref 0–1.5)
CREAT SERPL-MCNC: 1.71 MG/DL (ref 0.76–1.27)
DEPRECATED RDW RBC AUTO: 43.8 FL (ref 37–54)
EOSINOPHIL # BLD AUTO: 0.5 10*3/MM3 (ref 0–0.4)
EOSINOPHIL NFR BLD AUTO: 7.7 % (ref 0.3–6.2)
ERYTHROCYTE [DISTWIDTH] IN BLOOD BY AUTOMATED COUNT: 14.3 % (ref 12.3–15.4)
ERYTHROCYTE [SEDIMENTATION RATE] IN BLOOD: 35 MM/HR (ref 0–20)
GFR SERPL CREATININE-BSD FRML MDRD: 41 ML/MIN/1.73
HCT VFR BLD AUTO: 36.3 % (ref 37.5–51)
HGB BLD-MCNC: 12.2 G/DL (ref 13–17.7)
LYMPHOCYTES # BLD AUTO: 1.4 10*3/MM3 (ref 0.7–3.1)
LYMPHOCYTES NFR BLD AUTO: 22.7 % (ref 19.6–45.3)
MCH RBC QN AUTO: 29.9 PG (ref 26.6–33)
MCHC RBC AUTO-ENTMCNC: 33.7 G/DL (ref 31.5–35.7)
MCV RBC AUTO: 88.7 FL (ref 79–97)
MONOCYTES # BLD AUTO: 0.6 10*3/MM3 (ref 0.1–0.9)
MONOCYTES NFR BLD AUTO: 10.5 % (ref 5–12)
NEUTROPHILS NFR BLD AUTO: 3.5 10*3/MM3 (ref 1.7–7)
NEUTROPHILS NFR BLD AUTO: 57.7 % (ref 42.7–76)
NRBC BLD AUTO-RTO: 0.1 /100 WBC (ref 0–0.2)
PLATELET # BLD AUTO: 182 10*3/MM3 (ref 140–450)
PMV BLD AUTO: 9.7 FL (ref 6–12)
RBC # BLD AUTO: 4.09 10*6/MM3 (ref 4.14–5.8)
WBC # BLD AUTO: 6 10*3/MM3 (ref 3.4–10.8)

## 2021-01-04 PROCEDURE — 85025 COMPLETE CBC W/AUTO DIFF WBC: CPT | Performed by: NURSE PRACTITIONER

## 2021-01-04 PROCEDURE — 85651 RBC SED RATE NONAUTOMATED: CPT | Performed by: NURSE PRACTITIONER

## 2021-01-04 PROCEDURE — 36592 COLLECT BLOOD FROM PICC: CPT

## 2021-01-04 PROCEDURE — 82565 ASSAY OF CREATININE: CPT | Performed by: NURSE PRACTITIONER

## 2021-01-06 ENCOUNTER — OFFICE VISIT (OUTPATIENT)
Dept: WOUND CARE | Facility: HOSPITAL | Age: 61
End: 2021-01-06

## 2021-01-06 DIAGNOSIS — E11.42 TYPE 2 DIABETES MELLITUS WITH DIABETIC POLYNEUROPATHY, UNSPECIFIED WHETHER LONG TERM INSULIN USE (HCC): Chronic | ICD-10-CM

## 2021-01-06 DIAGNOSIS — L97.513 NON-PRESSURE CHRONIC ULCER OF OTHER PART OF RIGHT FOOT WITH NECROSIS OF MUSCLE (HCC): ICD-10-CM

## 2021-01-06 DIAGNOSIS — M14.671 CHARCOT'S JOINT, RIGHT ANKLE AND FOOT: ICD-10-CM

## 2021-01-06 PROCEDURE — G0463 HOSPITAL OUTPT CLINIC VISIT: HCPCS

## 2021-01-06 PROCEDURE — 99213 OFFICE O/P EST LOW 20 MIN: CPT | Performed by: PODIATRIST

## 2021-01-11 ENCOUNTER — HOSPITAL ENCOUNTER (OUTPATIENT)
Dept: INFUSION THERAPY | Facility: HOSPITAL | Age: 61
Setting detail: INFUSION SERIES
Discharge: HOME | End: 2021-01-11

## 2021-01-11 DIAGNOSIS — E08.621 DIABETIC ULCER OF OTHER PART OF RIGHT FOOT ASSOCIATED WITH DIABETES MELLITUS DUE TO UNDERLYING CONDITION, LIMITED TO BREAKDOWN OF SKIN (HCC): ICD-10-CM

## 2021-01-11 DIAGNOSIS — L97.511 DIABETIC ULCER OF OTHER PART OF RIGHT FOOT ASSOCIATED WITH DIABETES MELLITUS DUE TO UNDERLYING CONDITION, LIMITED TO BREAKDOWN OF SKIN (HCC): ICD-10-CM

## 2021-01-11 LAB
BASOPHILS # BLD AUTO: 0.1 10*3/MM3 (ref 0–0.2)
BASOPHILS NFR BLD AUTO: 1.7 % (ref 0–1.5)
CREAT SERPL-MCNC: 1.49 MG/DL (ref 0.76–1.27)
DEPRECATED RDW RBC AUTO: 44.6 FL (ref 37–54)
EOSINOPHIL # BLD AUTO: 0.4 10*3/MM3 (ref 0–0.4)
EOSINOPHIL NFR BLD AUTO: 6.9 % (ref 0.3–6.2)
ERYTHROCYTE [DISTWIDTH] IN BLOOD BY AUTOMATED COUNT: 14.5 % (ref 12.3–15.4)
ERYTHROCYTE [SEDIMENTATION RATE] IN BLOOD: 43 MM/HR (ref 0–20)
GFR SERPL CREATININE-BSD FRML MDRD: 48 ML/MIN/1.73
HCT VFR BLD AUTO: 35.2 % (ref 37.5–51)
HGB BLD-MCNC: 11.8 G/DL (ref 13–17.7)
LYMPHOCYTES # BLD AUTO: 1.3 10*3/MM3 (ref 0.7–3.1)
LYMPHOCYTES NFR BLD AUTO: 23.5 % (ref 19.6–45.3)
MCH RBC QN AUTO: 29.8 PG (ref 26.6–33)
MCHC RBC AUTO-ENTMCNC: 33.6 G/DL (ref 31.5–35.7)
MCV RBC AUTO: 88.6 FL (ref 79–97)
MONOCYTES # BLD AUTO: 0.6 10*3/MM3 (ref 0.1–0.9)
MONOCYTES NFR BLD AUTO: 11.5 % (ref 5–12)
NEUTROPHILS NFR BLD AUTO: 3.2 10*3/MM3 (ref 1.7–7)
NEUTROPHILS NFR BLD AUTO: 56.4 % (ref 42.7–76)
NRBC BLD AUTO-RTO: 0.1 /100 WBC (ref 0–0.2)
PLATELET # BLD AUTO: 186 10*3/MM3 (ref 140–450)
PMV BLD AUTO: 9.1 FL (ref 6–12)
RBC # BLD AUTO: 3.98 10*6/MM3 (ref 4.14–5.8)
WBC # BLD AUTO: 5.6 10*3/MM3 (ref 3.4–10.8)

## 2021-01-11 PROCEDURE — 82565 ASSAY OF CREATININE: CPT | Performed by: NURSE PRACTITIONER

## 2021-01-11 PROCEDURE — 85651 RBC SED RATE NONAUTOMATED: CPT | Performed by: NURSE PRACTITIONER

## 2021-01-11 PROCEDURE — 36592 COLLECT BLOOD FROM PICC: CPT

## 2021-01-11 PROCEDURE — 85025 COMPLETE CBC W/AUTO DIFF WBC: CPT | Performed by: NURSE PRACTITIONER

## 2021-01-13 ENCOUNTER — OFFICE VISIT (OUTPATIENT)
Dept: WOUND CARE | Facility: HOSPITAL | Age: 61
End: 2021-01-13

## 2021-01-13 DIAGNOSIS — L97.513 NON-PRESSURE CHRONIC ULCER OF OTHER PART OF RIGHT FOOT WITH NECROSIS OF MUSCLE (HCC): ICD-10-CM

## 2021-01-13 DIAGNOSIS — E11.42 TYPE 2 DIABETES MELLITUS WITH DIABETIC POLYNEUROPATHY, UNSPECIFIED WHETHER LONG TERM INSULIN USE (HCC): Chronic | ICD-10-CM

## 2021-01-13 DIAGNOSIS — M14.671 CHARCOT'S JOINT, RIGHT ANKLE AND FOOT: ICD-10-CM

## 2021-01-13 PROCEDURE — G0463 HOSPITAL OUTPT CLINIC VISIT: HCPCS

## 2021-01-13 PROCEDURE — 99213 OFFICE O/P EST LOW 20 MIN: CPT | Performed by: PODIATRIST

## 2021-01-15 ENCOUNTER — HOSPITAL ENCOUNTER (OUTPATIENT)
Dept: INFUSION THERAPY | Facility: HOSPITAL | Age: 61
Setting detail: INFUSION SERIES
Discharge: HOME | End: 2021-01-15

## 2021-01-15 DIAGNOSIS — E11.621 DIABETIC ULCER OF OTHER PART OF RIGHT FOOT ASSOCIATED WITH TYPE 2 DIABETES MELLITUS, WITH BONE INVOLVEMENT WITHOUT EVIDENCE OF NECROSIS (HCC): Primary | ICD-10-CM

## 2021-01-15 DIAGNOSIS — L97.516 DIABETIC ULCER OF OTHER PART OF RIGHT FOOT ASSOCIATED WITH TYPE 2 DIABETES MELLITUS, WITH BONE INVOLVEMENT WITHOUT EVIDENCE OF NECROSIS (HCC): Primary | ICD-10-CM

## 2021-01-20 ENCOUNTER — OFFICE VISIT (OUTPATIENT)
Dept: WOUND CARE | Facility: HOSPITAL | Age: 61
End: 2021-01-20

## 2021-01-20 DIAGNOSIS — L97.513 NON-PRESSURE CHRONIC ULCER OF OTHER PART OF RIGHT FOOT WITH NECROSIS OF MUSCLE (HCC): ICD-10-CM

## 2021-01-20 DIAGNOSIS — M14.671 CHARCOT'S JOINT, RIGHT ANKLE AND FOOT: ICD-10-CM

## 2021-01-20 DIAGNOSIS — E11.42 TYPE 2 DIABETES MELLITUS WITH DIABETIC POLYNEUROPATHY, UNSPECIFIED WHETHER LONG TERM INSULIN USE (HCC): Chronic | ICD-10-CM

## 2021-01-20 PROCEDURE — G0463 HOSPITAL OUTPT CLINIC VISIT: HCPCS

## 2021-01-20 PROCEDURE — 99213 OFFICE O/P EST LOW 20 MIN: CPT | Performed by: PODIATRIST

## 2021-01-21 DIAGNOSIS — L97.512 CHRONIC ULCER OF RIGHT FOOT WITH FAT LAYER EXPOSED (HCC): ICD-10-CM

## 2021-01-21 DIAGNOSIS — M14.671 CHARCOT'S JOINT OF RIGHT FOOT: Primary | ICD-10-CM

## 2021-01-28 RX ORDER — EMPAGLIFLOZIN 25 MG/1
25 TABLET, FILM COATED ORAL DAILY
COMMUNITY

## 2021-01-29 ENCOUNTER — HOSPITAL ENCOUNTER (OUTPATIENT)
Dept: GENERAL RADIOLOGY | Facility: HOSPITAL | Age: 61
Discharge: HOME | End: 2021-01-29

## 2021-01-29 ENCOUNTER — LAB (OUTPATIENT)
Dept: LAB | Facility: HOSPITAL | Age: 61
End: 2021-01-29

## 2021-01-29 DIAGNOSIS — M14.671 CHARCOT'S JOINT OF RIGHT FOOT: ICD-10-CM

## 2021-01-29 DIAGNOSIS — L97.512 CHRONIC ULCER OF RIGHT FOOT WITH FAT LAYER EXPOSED (HCC): ICD-10-CM

## 2021-01-29 LAB — MRSA DNA SPEC QL NAA+PROBE: NORMAL

## 2021-01-29 PROCEDURE — 36415 COLL VENOUS BLD VENIPUNCTURE: CPT

## 2021-01-29 PROCEDURE — 80048 BASIC METABOLIC PNL TOTAL CA: CPT

## 2021-01-29 PROCEDURE — 87641 MR-STAPH DNA AMP PROBE: CPT

## 2021-01-29 PROCEDURE — 71046 X-RAY EXAM CHEST 2 VIEWS: CPT

## 2021-01-30 LAB
ANION GAP SERPL CALCULATED.3IONS-SCNC: 14.7 MMOL/L (ref 5–15)
BUN SERPL-MCNC: 23 MG/DL (ref 6–20)
BUN/CREAT SERPL: 14.7 (ref 7–25)
CALCIUM SPEC-SCNC: 10.4 MG/DL (ref 8.6–10.5)
CHLORIDE SERPL-SCNC: 100 MMOL/L (ref 98–107)
CO2 SERPL-SCNC: 23.3 MMOL/L (ref 22–29)
CREAT SERPL-MCNC: 1.56 MG/DL (ref 0.76–1.27)
GFR SERPL CREATININE-BSD FRML MDRD: 46 ML/MIN/1.73
GLUCOSE SERPL-MCNC: 138 MG/DL (ref 65–99)
POTASSIUM SERPL-SCNC: 4 MMOL/L (ref 3.5–5.2)
SODIUM SERPL-SCNC: 138 MMOL/L (ref 136–145)

## 2021-02-03 ENCOUNTER — APPOINTMENT (OUTPATIENT)
Dept: WOUND CARE | Facility: HOSPITAL | Age: 61
End: 2021-02-03

## 2021-02-03 ENCOUNTER — LAB (OUTPATIENT)
Dept: LAB | Facility: HOSPITAL | Age: 61
End: 2021-02-03

## 2021-02-03 LAB — SARS-COV-2 ORF1AB RESP QL NAA+PROBE: NOT DETECTED

## 2021-02-03 PROCEDURE — C9803 HOPD COVID-19 SPEC COLLECT: HCPCS

## 2021-02-03 PROCEDURE — U0004 COV-19 TEST NON-CDC HGH THRU: HCPCS

## 2021-02-05 ENCOUNTER — HOSPITAL ENCOUNTER (OUTPATIENT)
Facility: HOSPITAL | Age: 61
Setting detail: HOSPITAL OUTPATIENT SURGERY
Discharge: HOME | End: 2021-02-05
Attending: PODIATRIST | Admitting: PODIATRIST

## 2021-02-05 ENCOUNTER — ANESTHESIA EVENT (OUTPATIENT)
Dept: PERIOP | Facility: HOSPITAL | Age: 61
End: 2021-02-05

## 2021-02-05 ENCOUNTER — ANESTHESIA (OUTPATIENT)
Dept: PERIOP | Facility: HOSPITAL | Age: 61
End: 2021-02-05

## 2021-02-05 VITALS
BODY MASS INDEX: 36.46 KG/M2 | RESPIRATION RATE: 18 BRPM | HEIGHT: 73 IN | HEART RATE: 91 BPM | OXYGEN SATURATION: 95 % | DIASTOLIC BLOOD PRESSURE: 70 MMHG | TEMPERATURE: 96.6 F | SYSTOLIC BLOOD PRESSURE: 106 MMHG | WEIGHT: 275.13 LBS

## 2021-02-05 DIAGNOSIS — M14.671 CHARCOT'S JOINT OF RIGHT FOOT: ICD-10-CM

## 2021-02-05 DIAGNOSIS — L97.512 CHRONIC ULCER OF RIGHT FOOT WITH FAT LAYER EXPOSED (HCC): ICD-10-CM

## 2021-02-05 LAB
GLUCOSE BLDC GLUCOMTR-MCNC: 110 MG/DL (ref 70–105)
GLUCOSE BLDC GLUCOMTR-MCNC: 129 MG/DL (ref 70–105)

## 2021-02-05 PROCEDURE — 25010000002 METOCLOPRAMIDE PER 10 MG: Performed by: NURSE ANESTHETIST, CERTIFIED REGISTERED

## 2021-02-05 PROCEDURE — 28122 PARTIAL REMOVAL OF FOOT BONE: CPT | Performed by: PODIATRIST

## 2021-02-05 PROCEDURE — 25010000002 ONDANSETRON PER 1 MG: Performed by: NURSE ANESTHETIST, CERTIFIED REGISTERED

## 2021-02-05 PROCEDURE — 82962 GLUCOSE BLOOD TEST: CPT

## 2021-02-05 PROCEDURE — S0260 H&P FOR SURGERY: HCPCS | Performed by: PODIATRIST

## 2021-02-05 PROCEDURE — 25010000002 MIDAZOLAM PER 1 MG: Performed by: NURSE ANESTHETIST, CERTIFIED REGISTERED

## 2021-02-05 PROCEDURE — 25010000002 FENTANYL CITRATE (PF) 100 MCG/2ML SOLUTION: Performed by: NURSE ANESTHETIST, CERTIFIED REGISTERED

## 2021-02-05 PROCEDURE — 11042 DBRDMT SUBQ TIS 1ST 20SQCM/<: CPT | Performed by: PODIATRIST

## 2021-02-05 PROCEDURE — 25010000002 PROPOFOL 10 MG/ML EMULSION: Performed by: NURSE ANESTHETIST, CERTIFIED REGISTERED

## 2021-02-05 RX ORDER — METOCLOPRAMIDE HYDROCHLORIDE 5 MG/ML
INJECTION INTRAMUSCULAR; INTRAVENOUS AS NEEDED
Status: DISCONTINUED | OUTPATIENT
Start: 2021-02-05 | End: 2021-02-05 | Stop reason: SURG

## 2021-02-05 RX ORDER — PHENYLEPHRINE HCL IN 0.9% NACL 0.5 MG/5ML
SYRINGE (ML) INTRAVENOUS AS NEEDED
Status: DISCONTINUED | OUTPATIENT
Start: 2021-02-05 | End: 2021-02-05 | Stop reason: SURG

## 2021-02-05 RX ORDER — KETAMINE HYDROCHLORIDE 10 MG/ML
INJECTION INTRAMUSCULAR; INTRAVENOUS AS NEEDED
Status: DISCONTINUED | OUTPATIENT
Start: 2021-02-05 | End: 2021-02-05 | Stop reason: SURG

## 2021-02-05 RX ORDER — PROPOFOL 10 MG/ML
VIAL (ML) INTRAVENOUS AS NEEDED
Status: DISCONTINUED | OUTPATIENT
Start: 2021-02-05 | End: 2021-02-05 | Stop reason: SURG

## 2021-02-05 RX ORDER — LIDOCAINE HYDROCHLORIDE 10 MG/ML
INJECTION, SOLUTION EPIDURAL; INFILTRATION; INTRACAUDAL; PERINEURAL AS NEEDED
Status: DISCONTINUED | OUTPATIENT
Start: 2021-02-05 | End: 2021-02-05 | Stop reason: SURG

## 2021-02-05 RX ORDER — DOXYCYCLINE HYCLATE 100 MG
100 TABLET ORAL 2 TIMES DAILY
Qty: 14 TABLET | Refills: 0 | Status: SHIPPED | OUTPATIENT
Start: 2021-02-05 | End: 2021-02-12

## 2021-02-05 RX ORDER — SODIUM CHLORIDE, SODIUM LACTATE, POTASSIUM CHLORIDE, CALCIUM CHLORIDE 600; 310; 30; 20 MG/100ML; MG/100ML; MG/100ML; MG/100ML
INJECTION, SOLUTION INTRAVENOUS CONTINUOUS PRN
Status: DISCONTINUED | OUTPATIENT
Start: 2021-02-05 | End: 2021-02-05 | Stop reason: SURG

## 2021-02-05 RX ORDER — ACETAMINOPHEN 650 MG
TABLET, EXTENDED RELEASE ORAL AS NEEDED
Status: DISCONTINUED | OUTPATIENT
Start: 2021-02-05 | End: 2021-02-05 | Stop reason: HOSPADM

## 2021-02-05 RX ORDER — HYDROCODONE BITARTRATE AND ACETAMINOPHEN 7.5; 325 MG/1; MG/1
1 TABLET ORAL EVERY 6 HOURS PRN
Qty: 28 TABLET | Refills: 0 | Status: SHIPPED | OUTPATIENT
Start: 2021-02-05 | End: 2021-02-18

## 2021-02-05 RX ORDER — ONDANSETRON 2 MG/ML
INJECTION INTRAMUSCULAR; INTRAVENOUS AS NEEDED
Status: DISCONTINUED | OUTPATIENT
Start: 2021-02-05 | End: 2021-02-05 | Stop reason: SURG

## 2021-02-05 RX ORDER — EPHEDRINE SULFATE/0.9% NACL/PF 25 MG/5 ML
SYRINGE (ML) INTRAVENOUS AS NEEDED
Status: DISCONTINUED | OUTPATIENT
Start: 2021-02-05 | End: 2021-02-05 | Stop reason: SURG

## 2021-02-05 RX ORDER — MIDAZOLAM HYDROCHLORIDE 1 MG/ML
INJECTION INTRAMUSCULAR; INTRAVENOUS AS NEEDED
Status: DISCONTINUED | OUTPATIENT
Start: 2021-02-05 | End: 2021-02-05 | Stop reason: SURG

## 2021-02-05 RX ORDER — FENTANYL CITRATE 50 UG/ML
INJECTION, SOLUTION INTRAMUSCULAR; INTRAVENOUS AS NEEDED
Status: DISCONTINUED | OUTPATIENT
Start: 2021-02-05 | End: 2021-02-05 | Stop reason: SURG

## 2021-02-05 RX ORDER — CLINDAMYCIN PHOSPHATE 900 MG/50ML
900 INJECTION, SOLUTION INTRAVENOUS ONCE
Status: COMPLETED | OUTPATIENT
Start: 2021-02-05 | End: 2021-02-05

## 2021-02-05 RX ADMIN — PHENYLEPHRINE HYDROCHLORIDE 100 MCG: 10 INJECTION INTRAVENOUS at 09:47

## 2021-02-05 RX ADMIN — PHENYLEPHRINE HYDROCHLORIDE 100 MCG: 10 INJECTION INTRAVENOUS at 10:14

## 2021-02-05 RX ADMIN — FENTANYL CITRATE 25 MCG: 50 INJECTION, SOLUTION INTRAMUSCULAR; INTRAVENOUS at 09:57

## 2021-02-05 RX ADMIN — SODIUM CHLORIDE, SODIUM LACTATE, POTASSIUM CHLORIDE, AND CALCIUM CHLORIDE: .6; .31; .03; .02 INJECTION, SOLUTION INTRAVENOUS at 09:35

## 2021-02-05 RX ADMIN — KETAMINE HYDROCHLORIDE 25 MG: 10 INJECTION, SOLUTION INTRAMUSCULAR; INTRAVENOUS at 10:00

## 2021-02-05 RX ADMIN — LIDOCAINE HYDROCHLORIDE 50 MG: 10 INJECTION, SOLUTION EPIDURAL; INFILTRATION; INTRACAUDAL; PERINEURAL at 09:42

## 2021-02-05 RX ADMIN — ONDANSETRON 4 MG: 2 INJECTION INTRAMUSCULAR; INTRAVENOUS at 10:14

## 2021-02-05 RX ADMIN — FENTANYL CITRATE 50 MCG: 50 INJECTION, SOLUTION INTRAMUSCULAR; INTRAVENOUS at 09:42

## 2021-02-05 RX ADMIN — METOCLOPRAMIDE 10 MG: 5 INJECTION, SOLUTION INTRAMUSCULAR; INTRAVENOUS at 10:02

## 2021-02-05 RX ADMIN — FENTANYL CITRATE 50 MCG: 50 INJECTION, SOLUTION INTRAMUSCULAR; INTRAVENOUS at 09:49

## 2021-02-05 RX ADMIN — CLINDAMYCIN PHOSPHATE 900 MG: 900 INJECTION, SOLUTION INTRAVENOUS at 09:45

## 2021-02-05 RX ADMIN — GLYCOPYRROLATE 0.2 MG: 0.2 INJECTION, SOLUTION INTRAMUSCULAR; INTRAVITREAL at 10:05

## 2021-02-05 RX ADMIN — Medication 5 MG: at 09:46

## 2021-02-05 RX ADMIN — PROPOFOL 200 MG: 10 INJECTION, EMULSION INTRAVENOUS at 09:42

## 2021-02-05 RX ADMIN — MIDAZOLAM 2 MG: 1 INJECTION INTRAMUSCULAR; INTRAVENOUS at 09:35

## 2021-02-05 NOTE — H&P
02/05/21   Foot and Ankle Surgery - Pre Op H&P  Provider: Dr. Robbi Pineda DPM  Location: [facility]    Subjective:  Agapito Canseco is a 60 y.o. male.     CC: Right foot wound    HPI: Patient is a 60-year-old diabetic male with Charcot foot deformity involving the right foot.  He has had a chronic ulceration involving the right foot and presents for surgery.  Denies any new issues or changes.    No Known Allergies    Past Medical History:   Diagnosis Date   • Cellulitis of right foot 11/25/2020   • Charcot foot due to diabetes mellitus (CMS/HCC) 11/15/2019   • Diabetes mellitus (CMS/HCC)     DM 2 and has charkoff's disease of right foot   • Diabetic retinopathy (CMS/HCC)     mild   • Elevated cholesterol    • GERD (gastroesophageal reflux disease)    • Hypertension    • NAFLD (nonalcoholic fatty liver disease) 9/20/2017   • PONV (postoperative nausea and vomiting)     x 1   • Swelling of ankle, right 01/2021       Past Surgical History:   Procedure Laterality Date   • CARDIAC CATHETERIZATION      no stent   • FOOT SURGERY      left foot tendons and bones   • HERNIA REPAIR      umbilical   • INCISION AND DRAINAGE LEG Right 11/27/2020    Procedure: INCISION AND DRAINAGE LOWER EXTREMITY;  Surgeon: MAX Pineda DPM;  Location: [facility];  Service: Podiatry;  Laterality: Right;       Family History   Problem Relation Age of Onset   • Heart disease Father        Social History     Socioeconomic History   • Marital status:      Spouse name: Not on file   • Number of children: Not on file   • Years of education: Not on file   • Highest education level: Not on file   Tobacco Use   • Smoking status: Never Smoker   • Smokeless tobacco: Never Used   Substance and Sexual Activity   • Alcohol use: No     Frequency: Never   • Drug use: No   • Sexual activity: Defer          Current Facility-Administered Medications:   •  clindamycin (CLEOCIN) 900 mg in sodium chloride 0.9% 50 mL IVPB (premix), 900 mg,  "Intravenous, Once, Miriam, T Robbi, DPM    Review of Systems:  General: Denies fever, chills, fatigue, and weakness.  Eyes: Denies vision loss, blurry vision, and excessive redness.  ENT: Denies hearing issues and difficulty swallowing.  Cardiovascular: Denies palpitations, chest pain, or syncopal episodes.  Respiratory: Denies shortness of breath, wheezing, and coughing.  GI: Denies abdominal pain, nausea, and vomiting.   : Denies frequency, hematuria, and urgency.  Musculoskeletal: Denies muscle cramps, joint pains, and stiffness.  Derm: + Right foot wound  Neuro: Denies headaches, numbness, loss of coordination, and tremors.  Psych: Denies anxiety and depression.  Endocrine: Denies temperature intolerance and changes in appetite.  Heme: Denies bleeding disorders or abnormal bruising.     Objective   /71   Pulse 79   Temp 97 °F (36.1 °C) (Temporal)   Resp 13   Ht 185.4 cm (73\")   Wt 125 kg (275 lb 2.2 oz)   SpO2 98%   BMI 36.30 kg/m²     Foot/Ankle Exam:       General:   Appearance: appears stated age and healthy    Orientation: AAOx3    Affect: appropriate    Gait: antalgic    Assistance: knee scooter      VASCULAR      Right Foot Vascularity   Normal vascular exam    Dorsalis pedis:  2+  Posterior tibial:  2+  Skin Temperature: warm    Edema Gradin+  CFT:  < 3 seconds  Pedal Hair Growth:  Present  Varicosities: none        NEUROLOGIC     Right Foot Neurologic   Light touch sensation:  Diminished  Hot/Cold sensation: diminished    Achilles reflex:  2+     MUSCULOSKELETAL      Right Foot Musculoskeletal   Ecchymosis:  None  Tenderness: none       MUSCLE STRENGTH     Right Foot Muscle Strength   Normal strength    Foot dorsiflexion:  5  Foot plantar flexion:  5  Foot inversion:  5  Foot eversion:  5     DERMATOLOGIC     Right Foot Dermatologic   Skin: maceration, ulcer and atrophic    Skin: right foot skin not intact, no right foot cellulitis, no right foot drainage and no right foot redness  "      Image:                   Assessment/Plan   Patient Active Problem List   Diagnosis   • Infected diabetic foot ulcer (CMS/HCC)   • Hyperlipidemia   • Charcot foot due to diabetes mellitus (CMS/HCC)   • Type II diabetes mellitus (CMS/HCC)   • GERD (gastroesophageal reflux disease)   • Essential hypertension   • Obesity (BMI 30-39.9)   • Diabetic foot infection (CMS/HCC)   • Cellulitis of right foot   • Diabetic retinopathy (CMS/HCC)   • DM (diabetes mellitus), type 2, uncontrolled (CMS/HCC)   • Erectile dysfunction   • NAFLD (nonalcoholic fatty liver disease)   • Elevated d-dimer   • Diabetic ulcer of right foot (CMS/HCC)   • Charcot's joint of right foot   • Chronic ulcer of right foot with fat layer exposed (CMS/HCC)     We will proceed with cuboid bone resection today and wound debridement.  Strict nonweightbearing activity after surgery.  We will follow up with me in 2 weeks.    Thank you for the consultation and allowing me to participate in this patient's care. Please call with any additional questions or concerns.     Note is dictated utilizing voice recognition software. Unfortunately this leads to occasional typographical errors. I apologize in advance if the situation occurs. If questions occur please do not hesitate to call our office.

## 2021-02-05 NOTE — OP NOTE
Operative Note   Foot and Ankle Surgery   Provider: Dr. Robbi Pineda   Location: [facility]      Procedure:  1. Partial cuboid bone resection, right foot  2. Excisional debridement to bone, right foot    Pre-operative Diagnosis:   1. Chronic ulcer to subcutaneous tissue, right foot  2. Charcot foot deformity, right foot  3. Diabetes mellitus with peripheral neuropathy    Post-operative Diagnosis: Same    Surgeon: Robbi Pineda    Assistant: Emory Hsu PGY-3    Anesthesia: General    Implants: None    Findings: No unexpected findings    Specimen: None    Blood Loss: Less than 5cc    Complications: None    Post Op Plan: Discharge home. Strict non-weight bearing activity. Follow up in 1-2 weeks.     Summary:     Patient is a 60-year-old diabetic male that has been seen in office with new wound involving the plantar aspect of the right foot.  Patient has prominent Charcot foot deformity. We have performed local wound care and offloading but the remains concern of continued wound with possible progression.  I have recommended that we consider partial cuboid resection with wound debridement.  Patient understands and agrees.  He will require strict nonweightbearing after surgery.     Procedure, risks, complications, and goals were discussed with the patient at bedside.  Risks include but are not limited to infection, complications from anesthesia (including death), chronic pain or numbness, hematoma/seroma, deep vein thrombosis, wound complications, and potential for additional surgical procedures.  Patient understands and elects to proceed with surgery at this time. Informed consent was obtained before proceeding to the operating suite.  All questions were answered to the patient's satisfaction. No guarantees or assurances were given or implied.     Procedure:     Patient was brought to the operating room and placed in the operative table in supine position.  Once adequate general anesthesia was administered,  a pneumatic tourniquet was placed about the patient's right calf.  The right lower extremity was scrubbed prepped and draped in usual sterile fashion.  The limb was elevated and exsanguinated and the pneumatic tourniquet was inflated to 250 mmHg.  A formal timeout was conducted prior skin incision.     Attention was then directed to the plantar wound.  The wound was fairly superficial and debrided with a 15 blade removing all viable and nonviable skin and subcutaneous tissue to a healthy bleeding base.  The post debridement measurement of the wound was 2.5 x 2.0 x 0.6cm.     A separate incision was performed to the lateral aspect of the foot at the level of the fifth metatarsal base.  The incision was performed in layers exposing the plantar aspect of the fifth metatarsal.  The soft tissues were plain from the plantar aspect of the foot exposing the large plantar prominence of the cuboid.  A sagittal saw was then used to resect the plantar aspect of the cuboid allowing for a plantigrade foot.  The bone fragment was removed in its entirety without complication.  All rough edges were smoothed.  No significant bleeding was identified.  The wound was irrigated with copious amounts of normal saline.  Deep structures were closed with a 2-0 Vicryl in a simple interrupted manner.  The skin was repaired with skin staples.  The incision was dressed with Xeroform and sterile compressive dressings.  The tourniquet was released and a prompt hyperemic response was noted to all digits of the right lower extremity.  The limb was placed into a well-padded posterior splint.  Patient tolerated the procedure and anesthesia well.  He was transferred from the operating room to the recovery room with vital signs stable and neurovascular is unchanged to the right lower extremity.       Dr. Robbi Pineda, DPM  [facility]  [phone]    Note is dictated utilizing voice recognition software. Unfortunately this leads to  occasional typographical errors. I apologize in advance if the situation occurs. If questions occur please do not hesitate to call our office.

## 2021-02-05 NOTE — ANESTHESIA PREPROCEDURE EVALUATION
Anesthesia Evaluation     Patient summary reviewed and Nursing notes reviewed   no history of anesthetic complications:  NPO Solid Status: > 8 hours  NPO Liquid Status: > 8 hours           Airway   Mallampati: II  TM distance: >3 FB  Neck ROM: full  No difficulty expected  Dental      Pulmonary - negative pulmonary ROS    breath sounds clear to auscultation  Cardiovascular     ECG reviewed  Rhythm: regular  Rate: normal    (+) hypertension, hyperlipidemia,       Neuro/Psych- negative ROS  GI/Hepatic/Renal/Endo    (+) obesity,  GERD,  diabetes mellitus type 2 well controlled using insulin,     Musculoskeletal (-) negative ROS    Abdominal     Abdomen: soft.   Substance History - negative use     OB/GYN negative ob/gyn ROS         Other - negative ROS       ROS/Med Hx Other: EF 60%                Anesthesia Plan    ASA 3     general     intravenous induction     Anesthetic plan, all risks, benefits, and alternatives have been provided, discussed and informed consent has been obtained with: patient.    Plan discussed with CRNA and CAA.

## 2021-02-05 NOTE — ANESTHESIA PROCEDURE NOTES
Airway  Date/Time: 2/5/2021 9:43 AM  End Time:2/5/2021 9:43 AM  Airway not difficult    Indications and Patient Condition  Indications for airway management: airway protection    Preoxygenated: yes  MILS not maintained throughout  Mask difficulty assessment: 0 - not attempted    Final Airway Details  Final airway type: supraglottic airway      Successful airway: LMA and flexible  Size 5  Airway Seal Pressure (cm H2O): 7    Number of attempts at approach: 1  Assessment: atraumatic intubation

## 2021-02-05 NOTE — ANESTHESIA POSTPROCEDURE EVALUATION
Patient: Agapito Canseco    Procedure Summary     Date: 02/05/21 Room / Location: [facility] OR 12 / [facility] MAIN OR    Anesthesia Start: 0935 Anesthesia Stop: 1036    Procedures:       DEBRIDEMENT WOUND (Right )      PARTIAL CUBOID RESECTION (Right Foot) Diagnosis:       Charcot's joint of right foot      Chronic ulcer of right foot with fat layer exposed (CMS/HCC)      (Charcot's joint of right foot [M14.671])      (Chronic ulcer of right foot with fat layer exposed (CMS/HCC) [L97.512])    Surgeon: MAX Pineda DPM Provider: Viridiana Levy MD    Anesthesia Type: general ASA Status: 3          Anesthesia Type: general    Vitals  Vitals Value Taken Time   /50 02/05/21 1118   Temp 97.2 °F (36.2 °C) 02/05/21 1118   Pulse 85 02/05/21 1119   Resp 12 02/05/21 1118   SpO2 96 % 02/05/21 1119   Vitals shown include unvalidated device data.        Post Anesthesia Care and Evaluation    Patient location during evaluation: PACU  Patient participation: complete - patient participated  Level of consciousness: awake  Pain management: adequate  Airway patency: patent  Anesthetic complications: No anesthetic complications  PONV Status: controlled  Cardiovascular status: acceptable  Respiratory status: acceptable  Hydration status: acceptable

## 2021-02-10 ENCOUNTER — OFFICE VISIT (OUTPATIENT)
Dept: PODIATRY | Facility: CLINIC | Age: 61
End: 2021-02-10

## 2021-02-10 VITALS
SYSTOLIC BLOOD PRESSURE: 121 MMHG | HEIGHT: 73 IN | HEART RATE: 91 BPM | DIASTOLIC BLOOD PRESSURE: 75 MMHG | WEIGHT: 275 LBS | BODY MASS INDEX: 36.45 KG/M2

## 2021-02-10 DIAGNOSIS — L97.512 CHRONIC ULCER OF RIGHT FOOT WITH FAT LAYER EXPOSED (HCC): Primary | ICD-10-CM

## 2021-02-10 DIAGNOSIS — M14.671 CHARCOT'S JOINT OF RIGHT FOOT: ICD-10-CM

## 2021-02-10 DIAGNOSIS — E11.42 DM TYPE 2 WITH DIABETIC PERIPHERAL NEUROPATHY (HCC): ICD-10-CM

## 2021-02-10 PROCEDURE — 99024 POSTOP FOLLOW-UP VISIT: CPT | Performed by: PODIATRIST

## 2021-02-10 RX ORDER — LIRAGLUTIDE 6 MG/ML
INJECTION SUBCUTANEOUS
COMMUNITY
Start: 2020-11-23 | End: 2021-07-06 | Stop reason: ALTCHOICE

## 2021-02-10 RX ORDER — LOSARTAN POTASSIUM 100 MG/1
100 TABLET ORAL DAILY
COMMUNITY
Start: 2020-12-09

## 2021-02-10 RX ORDER — AMLODIPINE BESYLATE 10 MG/1
TABLET ORAL
COMMUNITY
Start: 2021-01-14

## 2021-02-10 RX ORDER — METOPROLOL SUCCINATE 100 MG/1
150 TABLET, EXTENDED RELEASE ORAL DAILY
COMMUNITY
Start: 2021-02-09 | End: 2021-05-10

## 2021-02-10 RX ORDER — CLINDAMYCIN HYDROCHLORIDE 300 MG/1
300 CAPSULE ORAL 4 TIMES DAILY
Qty: 40 CAPSULE | Refills: 0 | Status: SHIPPED | OUTPATIENT
Start: 2021-02-10 | End: 2021-02-20

## 2021-02-10 RX ORDER — FLASH GLUCOSE SENSOR
KIT MISCELLANEOUS
COMMUNITY
Start: 2021-01-18

## 2021-02-10 RX ORDER — ERGOCALCIFEROL 1.25 MG/1
100000 CAPSULE ORAL
COMMUNITY
Start: 2020-12-08

## 2021-02-10 RX ORDER — INSULIN GLARGINE 300 U/ML
INJECTION, SOLUTION SUBCUTANEOUS
COMMUNITY
Start: 2020-11-17 | End: 2021-07-06 | Stop reason: ALTCHOICE

## 2021-02-10 NOTE — PROGRESS NOTES
02/10/2021  Foot and Ankle Surgery - Established Patient/Follow-up  Provider: Dr. Robbi Pineda DPM  Location: [facility]    Subjective:  Agapito Canseco is a 60 y.o. male.     Chief Complaint   Patient presents with   • Right Foot - Follow-up, Wound Check, Post-op       HPI: Patient returns for follow-up approximately 1 week after surgery.  He states that he started to notice an odor and was concerned.  He did have some fairly heavy postoperative bleeding but eventually stopped.  He has remained off weightbearing as instructed.  And taking the oral antibiotic as directed.    No Known Allergies    Current Outpatient Medications on File Prior to Visit   Medication Sig Dispense Refill   • amLODIPine (NORVASC) 10 MG tablet      • aspirin 81 MG chewable tablet Chew 81 mg Daily.     • atorvastatin (LIPITOR) 40 MG tablet Take 40 mg by mouth Daily.     • Continuous Blood Gluc Sensor (FreeStyle Shilo 14 Day Sensor) misc      • doxycycline (VIBRAMYICN) 100 MG tablet Take 1 tablet by mouth 2 (Two) Times a Day for 7 days. 14 tablet 0   • Empagliflozin (Jardiance) 25 MG tablet Take 25 mg by mouth Daily. Do not take dos     • HYDROcodone-acetaminophen (NORCO) 7.5-325 MG per tablet Take 1 tablet by mouth Every 6 (Six) Hours As Needed for Moderate Pain  (Pain). 28 tablet 0   • Insulin Degludec (TRESIBA) 100 UNIT/ML solution injection Inject 152 Units under the skin into the appropriate area as directed Daily. Patient takes 2 injections of 76 units of Tresiba in the morning, 76 units on one side of the abdomen and 76 units on the other side of the abdomen for a total of 152 units daily in the AM   Take dos     • losartan (COZAAR) 100 MG tablet Take 100 mg by mouth Daily.     • LOSARTAN POTASSIUM PO Take 100 mg by mouth Daily. Do not take 24 hours prior to surgery     • metFORMIN (GLUCOPHAGE) 1000 MG tablet Take 1,000 mg by mouth 2 (Two) Times a Day With Meals. Do not take 48 hours prior to surgery.  LD 2-3 before 1045    "  • metoprolol succinate XL (TOPROL-XL) 100 MG 24 hr tablet Take 150 mg by mouth Daily.     • metoprolol succinate XL (TOPROL-XL) 50 MG 24 hr tablet Take 100 mg by mouth every night at bedtime.     • pantoprazole (PROTONIX) 40 MG EC tablet Take 40 mg by mouth Daily. Take dos     • pioglitazone (ACTOS) 30 MG tablet Take 30 mg by mouth Every Morning. Do not take dos     • raNITIdine (ZANTAC) 150 MG tablet Take 150 mg by mouth Daily.     • Toujeo Max SoloStar 300 UNIT/ML solution pen-injector injection INJECT 75 80 UNITS INTO THE SKIN TWICE A DAY     • Victoza 18 MG/3ML solution pen-injector injection INJECT 1.8 MG UNDER THE SKIN ONCE DAILY     • vitamin D (ERGOCALCIFEROL) 1.25 MG (77421 UT) capsule capsule Take 100,000 Units by mouth Every 7 (Seven) Days.     • [DISCONTINUED] Ergocalciferol (VITAMIN D2 PO) Take 1.25 mg by mouth 2 (Two) Times a Week. Patient taks on Sundays and Wednesdays     • [DISCONTINUED] Liraglutide (VICTOZA SC) Inject 1.8 mg under the skin into the appropriate area as directed Every Morning. Do not take dos       No current facility-administered medications on file prior to visit.        Objective   /75   Pulse 91   Ht 185.4 cm (73\")   Wt 125 kg (275 lb)   BMI 36.28 kg/m²     Foot/Ankle Exam:       General:   Appearance: appears stated age and healthy    Orientation: AAOx3    Affect: appropriate    Assistance: knee scooter      VASCULAR      Right Foot Vascularity   Normal vascular exam    Dorsalis pedis:  2+  Posterior tibial:  2+  Skin Temperature: warm    Edema Grading:  None  CFT:  < 3 seconds  Pedal Hair Growth:  Present  Varicosities: none        NEUROLOGIC     Right Foot Neurologic   Light touch sensation:  Diminished  Hot/Cold sensation: diminished    Protective Sensation using [place]-Omar Monofilament:  Diminished  Achilles reflex:  1+     MUSCULOSKELETAL      Right Foot Musculoskeletal   Ecchymosis:  None  Tenderness: none    Arch:  Charcot     MUSCLE STRENGTH     Right " Foot Muscle Strength   Normal strength    Foot dorsiflexion:  5  Foot plantar flexion:  5  Foot inversion:  5  Foot eversion:  5      Right Foot Additional Comments Incision site is dry and stable with intact staples.  No evidence of dehiscence or infection.  Plantar wound remains open and granular in appearance.  No extension to deep structures.  No purulence.  Minimal malodor      Assessment/Plan   Diagnoses and all orders for this visit:    1. Chronic ulcer of right foot with fat layer exposed (CMS/HCC) (Primary)    2. Charcot's joint of right foot    3. DM type 2 with diabetic peripheral neuropathy (CMS/HCC)      Patient returns early for concerns of odor involving the right lower extremity dressing.  After removal, there is no erythema or dehiscence to the incision site.  The plantar wound is also quite stable.  He has no concerning features of infection and I explained that his odor is likely due to the dried blood that is on the splint.  I have reapplied a Betadine wet-to-dry dressing and asked that he remain strictly off weightbearing.  He is to monitor closely and call with any issues or concerns.  I would like to see him in 1 week for reevaluation.  I have prescribed a course of clindamycin for further suppression.    No orders of the defined types were placed in this encounter.         Note is dictated utilizing voice recognition software. Unfortunately this leads to occasional typographical errors. I apologize in advance if the situation occurs. If questions occur please do not hesitate to call our office.

## 2021-02-18 ENCOUNTER — OFFICE VISIT (OUTPATIENT)
Dept: PODIATRY | Facility: CLINIC | Age: 61
End: 2021-02-18

## 2021-02-18 VITALS
HEART RATE: 65 BPM | DIASTOLIC BLOOD PRESSURE: 79 MMHG | BODY MASS INDEX: 36.45 KG/M2 | HEIGHT: 73 IN | WEIGHT: 275 LBS | SYSTOLIC BLOOD PRESSURE: 130 MMHG

## 2021-02-18 DIAGNOSIS — E11.42 DM TYPE 2 WITH DIABETIC PERIPHERAL NEUROPATHY (HCC): ICD-10-CM

## 2021-02-18 DIAGNOSIS — M14.671 CHARCOT'S JOINT OF RIGHT FOOT: ICD-10-CM

## 2021-02-18 DIAGNOSIS — L97.512 CHRONIC ULCER OF RIGHT FOOT WITH FAT LAYER EXPOSED (HCC): Primary | ICD-10-CM

## 2021-02-18 PROCEDURE — 99213 OFFICE O/P EST LOW 20 MIN: CPT | Performed by: PODIATRIST

## 2021-02-18 NOTE — PROGRESS NOTES
02/18/2021  Foot and Ankle Surgery - Established Patient/Follow-up  Provider: Dr. Robbi Pineda DPM  Location: [facility]    Subjective:  Agapito Canseco is a 60 y.o. male.     Chief Complaint   Patient presents with   • Right Foot - Follow-up, Wound Check, Post-op       HPI: Patient returns for follow-up on his right foot.  He states that he is doing quite well.  He has remained off weightbearing as instructed.  Dressing has remained intact since last appointment.    No Known Allergies    Current Outpatient Medications on File Prior to Visit   Medication Sig Dispense Refill   • amLODIPine (NORVASC) 10 MG tablet      • aspirin 81 MG chewable tablet Chew 81 mg Daily.     • atorvastatin (LIPITOR) 40 MG tablet Take 40 mg by mouth Daily.     • clindamycin (CLEOCIN) 300 MG capsule Take 1 capsule by mouth 4 (Four) Times a Day for 10 days. 40 capsule 0   • Continuous Blood Gluc Sensor (FreeStyle Shilo 14 Day Sensor) misc      • Empagliflozin (Jardiance) 25 MG tablet Take 25 mg by mouth Daily. Do not take dos     • Insulin Degludec (TRESIBA) 100 UNIT/ML solution injection Inject 152 Units under the skin into the appropriate area as directed Daily. Patient takes 2 injections of 76 units of Tresiba in the morning, 76 units on one side of the abdomen and 76 units on the other side of the abdomen for a total of 152 units daily in the AM   Take dos     • losartan (COZAAR) 100 MG tablet Take 100 mg by mouth Daily.     • LOSARTAN POTASSIUM PO Take 100 mg by mouth Daily. Do not take 24 hours prior to surgery     • metFORMIN (GLUCOPHAGE) 1000 MG tablet Take 1,000 mg by mouth 2 (Two) Times a Day With Meals. Do not take 48 hours prior to surgery.  LD 2-3 before 1045     • metoprolol succinate XL (TOPROL-XL) 100 MG 24 hr tablet Take 150 mg by mouth Daily.     • metoprolol succinate XL (TOPROL-XL) 50 MG 24 hr tablet Take 100 mg by mouth every night at bedtime.     • pantoprazole (PROTONIX) 40 MG EC tablet Take 40 mg by mouth  "Daily. Take dos     • pioglitazone (ACTOS) 30 MG tablet Take 30 mg by mouth Every Morning. Do not take dos     • raNITIdine (ZANTAC) 150 MG tablet Take 150 mg by mouth Daily.     • Toujeo Max SoloStar 300 UNIT/ML solution pen-injector injection INJECT 75 80 UNITS INTO THE SKIN TWICE A DAY     • Victoza 18 MG/3ML solution pen-injector injection INJECT 1.8 MG UNDER THE SKIN ONCE DAILY     • vitamin D (ERGOCALCIFEROL) 1.25 MG (03639 UT) capsule capsule Take 100,000 Units by mouth Every 7 (Seven) Days.     • [DISCONTINUED] HYDROcodone-acetaminophen (NORCO) 7.5-325 MG per tablet Take 1 tablet by mouth Every 6 (Six) Hours As Needed for Moderate Pain  (Pain). 28 tablet 0     No current facility-administered medications on file prior to visit.        Objective   /79   Pulse 65   Ht 185.4 cm (73\")   Wt 125 kg (275 lb)   BMI 36.28 kg/m²      General:   Appearance: appears stated age and healthy    Orientation: AAOx3    Affect: appropriate    Assistance: knee scooter       VASCULAR       Right Foot Vascularity   Normal vascular exam    Dorsalis pedis:  2+  Posterior tibial:  2+  Skin Temperature: warm    Edema Grading:  None  CFT:  < 3 seconds  Pedal Hair Growth:  Present  Varicosities: none        NEUROLOGIC      Right Foot Neurologic   Light touch sensation:  Diminished  Hot/Cold sensation: diminished    Protective Sensation using [place]mar Monofilament:  Diminished  Achilles reflex:  1+      MUSCULOSKELETAL       Right Foot Musculoskeletal   Ecchymosis:  None  Tenderness: none    Arch:  Charcot      MUSCLE STRENGTH      Right Foot Muscle Strength   Normal strength    Foot dorsiflexion:  5  Foot plantar flexion:  5  Foot inversion:  5  Foot eversion:  5      Right Foot Additional Comments: Lateral incision appears to be well-healed with intact staples.  No evidence of dehiscence or infection.  The plantar wound is improving significantly.  The base of the wound is granular and now measures approximately 2 cm " in diameter.  No other concerning features    Assessment/Plan   Diagnoses and all orders for this visit:    1. Chronic ulcer of right foot with fat layer exposed (CMS/HCC) (Primary)    2. Charcot's joint of right foot    3. DM type 2 with diabetic peripheral neuropathy (CMS/HCC)      Patient appears to be doing quite well.  Staples were removed from the lateral incision.  He has no concerning features from the surgery.  The plantar wound has continued to improve.  The base of the wound is quite granular and I do feel that he would be an ideal candidate for collagen dressings.  Dressings have been ordered from [facility].  We did discuss proper dressing application and interval.  I would like him to remain strictly off weightbearing and monitor closely.  He is to call with any issues or concerns.  I will see him in 2 weeks for reevaluation.    No orders of the defined types were placed in this encounter.         Note is dictated utilizing voice recognition software. Unfortunately this leads to occasional typographical errors. I apologize in advance if the situation occurs. If questions occur please do not hesitate to call our office.

## 2021-02-24 ENCOUNTER — TELEPHONE (OUTPATIENT)
Dept: ORTHOPEDICS | Facility: OTHER | Age: 61
End: 2021-02-24

## 2021-02-24 NOTE — TELEPHONE ENCOUNTER
Caller:  MICHAEL- SPOUSE     Reason For Call:  CHANGING COLLAGEN DRESSING QUESTIONS.    ASKING IF IT IS NORMAL TO HAVE THE DRESSING ABSORB INTO WHERE IT IS PLACED, OR IF SHE SHOULD CLEAN PRIOR DAYS RESIDUE EACH TIME BEFORE REDRESSING.       Caller# [phone]  OKAY TO CALL BACK AFTER 11AM.

## 2021-03-04 ENCOUNTER — OFFICE VISIT (OUTPATIENT)
Dept: PODIATRY | Facility: CLINIC | Age: 61
End: 2021-03-04

## 2021-03-04 VITALS
HEIGHT: 73 IN | WEIGHT: 275 LBS | DIASTOLIC BLOOD PRESSURE: 72 MMHG | HEART RATE: 88 BPM | BODY MASS INDEX: 36.45 KG/M2 | SYSTOLIC BLOOD PRESSURE: 123 MMHG

## 2021-03-04 DIAGNOSIS — L97.512 CHRONIC ULCER OF RIGHT FOOT WITH FAT LAYER EXPOSED (HCC): Primary | ICD-10-CM

## 2021-03-04 DIAGNOSIS — E11.42 DM TYPE 2 WITH DIABETIC PERIPHERAL NEUROPATHY (HCC): ICD-10-CM

## 2021-03-04 DIAGNOSIS — M14.671 CHARCOT'S JOINT OF RIGHT FOOT: ICD-10-CM

## 2021-03-04 PROCEDURE — 99213 OFFICE O/P EST LOW 20 MIN: CPT | Performed by: PODIATRIST

## 2021-03-04 NOTE — PROGRESS NOTES
03/04/2021  Foot and Ankle Surgery - Established Patient/Follow-up  Provider: Dr. Robbi Pineda DPM  Location: [facility]    Subjective:  Agapito Canseco is a 60 y.o. male.     Chief Complaint   Patient presents with   • Right Foot - Follow-up, Wound Check       HPI: Patient returns for follow-up on plantar wound on the right foot.  The lateral incision remains well-healed.  He has remained mostly off weightbearing and is performing the collagen dressing changes every other day as directed.    No Known Allergies    Current Outpatient Medications on File Prior to Visit   Medication Sig Dispense Refill   • amLODIPine (NORVASC) 10 MG tablet      • aspirin 81 MG chewable tablet Chew 81 mg Daily.     • atorvastatin (LIPITOR) 40 MG tablet Take 40 mg by mouth Daily.     • Continuous Blood Gluc Sensor (Existence Before EssenceStyle Shilo 14 Day Sensor) misc      • Empagliflozin (Jardiance) 25 MG tablet Take 25 mg by mouth Daily. Do not take dos     • Insulin Degludec (TRESIBA) 100 UNIT/ML solution injection Inject 152 Units under the skin into the appropriate area as directed Daily. Patient takes 2 injections of 76 units of Tresiba in the morning, 76 units on one side of the abdomen and 76 units on the other side of the abdomen for a total of 152 units daily in the AM   Take dos     • losartan (COZAAR) 100 MG tablet Take 100 mg by mouth Daily.     • metFORMIN (GLUCOPHAGE) 1000 MG tablet Take 1,000 mg by mouth 2 (Two) Times a Day With Meals. Do not take 48 hours prior to surgery.  LD 2-3 before 1045     • metoprolol succinate XL (TOPROL-XL) 100 MG 24 hr tablet Take 150 mg by mouth Daily.     • metoprolol succinate XL (TOPROL-XL) 50 MG 24 hr tablet Take 100 mg by mouth every night at bedtime.     • pantoprazole (PROTONIX) 40 MG EC tablet Take 40 mg by mouth Daily. Take dos     • pioglitazone (ACTOS) 30 MG tablet Take 30 mg by mouth Every Morning. Do not take dos     • raNITIdine (ZANTAC) 150 MG tablet Take 150 mg by mouth Daily.    "  • Toujeo Max SoloStar 300 UNIT/ML solution pen-injector injection INJECT 75 80 UNITS INTO THE SKIN TWICE A DAY     • Victoza 18 MG/3ML solution pen-injector injection INJECT 1.8 MG UNDER THE SKIN ONCE DAILY     • vitamin D (ERGOCALCIFEROL) 1.25 MG (53846 UT) capsule capsule Take 100,000 Units by mouth Every 7 (Seven) Days.     • [DISCONTINUED] LOSARTAN POTASSIUM PO Take 100 mg by mouth Daily. Do not take 24 hours prior to surgery       No current facility-administered medications on file prior to visit.        Objective   /72   Pulse 88   Ht 185.4 cm (73\")   Wt 125 kg (275 lb)   BMI 36.28 kg/m²     General:   Appearance: appears stated age and healthy    Orientation: AAOx3    Affect: appropriate    Assistance: knee scooter       VASCULAR       Right Foot Vascularity   Normal vascular exam    Dorsalis pedis:  2+  Posterior tibial:  2+  Skin Temperature: warm    Edema Grading:  None  CFT:  < 3 seconds  Pedal Hair Growth:  Present  Varicosities: none        NEUROLOGIC      Right Foot Neurologic   Light touch sensation:  Diminished  Hot/Cold sensation: diminished    Protective Sensation using [place]-Omar Monofilament:  Diminished  Achilles reflex:  1+      MUSCULOSKELETAL       Right Foot Musculoskeletal   Ecchymosis:  None  Tenderness: none    Arch:  Charcot      MUSCLE STRENGTH      Right Foot Muscle Strength   Normal strength    Foot dorsiflexion:  5  Foot plantar flexion:  5  Foot inversion:  5  Foot eversion:  5      Right Foot Additional Comments:  Ulceration has continued to improve and now measures approximately 1 cm in diameter and extremely superficial.  Mild periwound hyperkeratosis.  No erythema, edema, or signs of infection    Assessment/Plan   Diagnoses and all orders for this visit:    1. Chronic ulcer of right foot with fat layer exposed (CMS/HCC) (Primary)    2. Charcot's joint of right foot    3. DM type 2 with diabetic peripheral neuropathy (CMS/HCC)      Patient is doing quite well.  " The lateral incision is well-healed.  The plantar wound remains open but is improving gradually.  I have asked that he continue the collagen dressing changes every other day and remain mostly off weightbearing.  He is to call with any progressive issues or concerns.  I would like to see him in 2 weeks for reevaluation of the plantar wound.    No orders of the defined types were placed in this encounter.         Note is dictated utilizing voice recognition software. Unfortunately this leads to occasional typographical errors. I apologize in advance if the situation occurs. If questions occur please do not hesitate to call our office.

## 2021-03-22 ENCOUNTER — OFFICE VISIT (OUTPATIENT)
Dept: PODIATRY | Facility: CLINIC | Age: 61
End: 2021-03-22

## 2021-03-22 VITALS
HEIGHT: 73 IN | DIASTOLIC BLOOD PRESSURE: 82 MMHG | BODY MASS INDEX: 36.45 KG/M2 | HEART RATE: 76 BPM | WEIGHT: 275 LBS | SYSTOLIC BLOOD PRESSURE: 152 MMHG

## 2021-03-22 DIAGNOSIS — E11.42 DM TYPE 2 WITH DIABETIC PERIPHERAL NEUROPATHY (HCC): ICD-10-CM

## 2021-03-22 DIAGNOSIS — L97.512 CHRONIC ULCER OF RIGHT FOOT WITH FAT LAYER EXPOSED (HCC): Primary | ICD-10-CM

## 2021-03-22 DIAGNOSIS — M14.671 CHARCOT'S JOINT OF RIGHT FOOT: ICD-10-CM

## 2021-03-22 PROCEDURE — 99213 OFFICE O/P EST LOW 20 MIN: CPT | Performed by: PODIATRIST

## 2021-03-23 NOTE — PROGRESS NOTES
03/22/2021  Foot and Ankle Surgery - Established Patient/Follow-up  Provider: Dr. Robbi Pineda DPM  Location: [facility]    Subjective:  Agapito Canseco is a 60 y.o. male.     Chief Complaint   Patient presents with   • Right Foot - Follow-up, Wound Check       HPI: Patient returns for evaluation of Charcot foot deformity.  He states that he is doing quite well.  He denies any additional issues but continues to have an open wound on the plantar aspect of the foot.  He has not noticed any significant drainage or signs of infection.  He has tried to remain off weightbearing but has returned to a regular shoe with the use of his custom accommodative inserts.    No Known Allergies    Current Outpatient Medications on File Prior to Visit   Medication Sig Dispense Refill   • amLODIPine (NORVASC) 10 MG tablet      • aspirin 81 MG chewable tablet Chew 81 mg Daily.     • atorvastatin (LIPITOR) 40 MG tablet Take 40 mg by mouth Daily.     • Continuous Blood Gluc Sensor ([employer].comStyle Shilo 14 Day Sensor) misc      • Empagliflozin (Jardiance) 25 MG tablet Take 25 mg by mouth Daily. Do not take dos     • Insulin Degludec (TRESIBA) 100 UNIT/ML solution injection Inject 152 Units under the skin into the appropriate area as directed Daily. Patient takes 2 injections of 76 units of Tresiba in the morning, 76 units on one side of the abdomen and 76 units on the other side of the abdomen for a total of 152 units daily in the AM   Take dos     • losartan (COZAAR) 100 MG tablet Take 100 mg by mouth Daily.     • metFORMIN (GLUCOPHAGE) 1000 MG tablet Take 1,000 mg by mouth 2 (Two) Times a Day With Meals. Do not take 48 hours prior to surgery.  LD 2-3 before 1045     • metoprolol succinate XL (TOPROL-XL) 100 MG 24 hr tablet Take 150 mg by mouth Daily.     • metoprolol succinate XL (TOPROL-XL) 50 MG 24 hr tablet Take 100 mg by mouth every night at bedtime.     • pantoprazole (PROTONIX) 40 MG EC tablet Take 40 mg by mouth Daily.  "Take dos     • pioglitazone (ACTOS) 30 MG tablet Take 30 mg by mouth Every Morning. Do not take dos     • raNITIdine (ZANTAC) 150 MG tablet Take 150 mg by mouth Daily.     • Toujeo Max SoloStar 300 UNIT/ML solution pen-injector injection INJECT 75 80 UNITS INTO THE SKIN TWICE A DAY     • Victoza 18 MG/3ML solution pen-injector injection INJECT 1.8 MG UNDER THE SKIN ONCE DAILY     • vitamin D (ERGOCALCIFEROL) 1.25 MG (08968 UT) capsule capsule Take 100,000 Units by mouth Every 7 (Seven) Days.       No current facility-administered medications on file prior to visit.       Objective   /82   Pulse 76   Ht 185.4 cm (73\")   Wt 125 kg (275 lb)   BMI 36.28 kg/m²     General:   Appearance: appears stated age and healthy    Orientation: AAOx3    Affect: appropriate    Assistance: knee scooter       VASCULAR       Right Foot Vascularity   Normal vascular exam    Dorsalis pedis:  2+  Posterior tibial:  2+  Skin Temperature: warm    Edema Grading:  None  CFT:  < 3 seconds  Pedal Hair Growth:  Present  Varicosities: none        NEUROLOGIC      Right Foot Neurologic   Light touch sensation:  Diminished  Hot/Cold sensation: diminished    Protective Sensation using [place]-Omar Monofilament:  Diminished  Achilles reflex:  1+      MUSCULOSKELETAL       Right Foot Musculoskeletal   Ecchymosis:  None  Tenderness: none    Arch:  Charcot      MUSCLE STRENGTH      Right Foot Muscle Strength   Normal strength    Foot dorsiflexion:  5  Foot plantar flexion:  5  Foot inversion:  5  Foot eversion:  5      Right Foot Additional Comments:   Lateral incision remains well-healed.  Plantar ulceration remains in our measures 0.5 cm in diameter with granular base and no signs of maceration or infection.  No other concerning features are present to the foot    Assessment/Plan   Diagnoses and all orders for this visit:    1. Chronic ulcer of right foot with fat layer exposed (CMS/HCC) (Primary)    2. Charcot's joint of right foot    3. " DM type 2 with diabetic peripheral neuropathy (CMS/HCC)      Patient appears to be doing quite well from the surgical perspective.  The incision site is well-healed and he continues to remain well decompressed on the plantar aspect of the foot.  He continues to have a small open wound measuring approximately 0.5 cm in diameter.  I do feel that this might be attributed to his increased activity in a regular shoe with his old inserts.  I would like him to return to the cam boot with decreased activity.  I do feel that this will allow the wound to fully heal and we can obtain new devices.  I would like him to continue to apply Betadine and monitor the wound closely.  He is to return in 2 weeks for reevaluation    No orders of the defined types were placed in this encounter.         Note is dictated utilizing voice recognition software. Unfortunately this leads to occasional typographical errors. I apologize in advance if the situation occurs. If questions occur please do not hesitate to call our office.

## 2021-04-06 ENCOUNTER — OFFICE VISIT (OUTPATIENT)
Dept: PODIATRY | Facility: CLINIC | Age: 61
End: 2021-04-06

## 2021-04-06 VITALS
HEART RATE: 77 BPM | HEIGHT: 73 IN | SYSTOLIC BLOOD PRESSURE: 148 MMHG | WEIGHT: 282 LBS | BODY MASS INDEX: 37.37 KG/M2 | DIASTOLIC BLOOD PRESSURE: 79 MMHG

## 2021-04-06 DIAGNOSIS — E11.42 DM TYPE 2 WITH DIABETIC PERIPHERAL NEUROPATHY (HCC): ICD-10-CM

## 2021-04-06 DIAGNOSIS — M14.671 CHARCOT'S JOINT OF RIGHT FOOT: Primary | ICD-10-CM

## 2021-04-06 PROCEDURE — 99024 POSTOP FOLLOW-UP VISIT: CPT | Performed by: PODIATRIST

## 2021-04-07 NOTE — PROGRESS NOTES
04/06/2021  Foot and Ankle Surgery - Established Patient/Follow-up  Provider: Dr. Robbi Pineda DPM  Location: [facility]    Subjective:  Agapito Canseco is a 60 y.o. male.     Chief Complaint   Patient presents with   • Right Foot - Follow-up, Wound Check       HPI: Patient returns approximately 2 months after surgery to the right foot.  He has noticed that the wound has completely healed.  He states that he has returned to his regular shoe and diabetic insert.  He is doing well and has not had any substantial complaints.    No Known Allergies    Current Outpatient Medications on File Prior to Visit   Medication Sig Dispense Refill   • amLODIPine (NORVASC) 10 MG tablet      • aspirin 81 MG chewable tablet Chew 81 mg Daily.     • atorvastatin (LIPITOR) 40 MG tablet Take 40 mg by mouth Daily.     • Continuous Blood Gluc Sensor (FunGoPlayStyle Shilo 14 Day Sensor) misc      • Empagliflozin (Jardiance) 25 MG tablet Take 25 mg by mouth Daily. Do not take dos     • Insulin Degludec (TRESIBA) 100 UNIT/ML solution injection Inject 152 Units under the skin into the appropriate area as directed Daily. Patient takes 2 injections of 76 units of Tresiba in the morning, 76 units on one side of the abdomen and 76 units on the other side of the abdomen for a total of 152 units daily in the AM   Take dos     • losartan (COZAAR) 100 MG tablet Take 100 mg by mouth Daily.     • metFORMIN (GLUCOPHAGE) 1000 MG tablet Take 1,000 mg by mouth 2 (Two) Times a Day With Meals. Do not take 48 hours prior to surgery.  LD 2-3 before 1045     • metoprolol succinate XL (TOPROL-XL) 100 MG 24 hr tablet Take 150 mg by mouth Daily.     • metoprolol succinate XL (TOPROL-XL) 50 MG 24 hr tablet Take 100 mg by mouth every night at bedtime.     • pantoprazole (PROTONIX) 40 MG EC tablet Take 40 mg by mouth Daily. Take dos     • pioglitazone (ACTOS) 30 MG tablet Take 30 mg by mouth Every Morning. Do not take dos     • raNITIdine (ZANTAC) 150 MG tablet  "Take 150 mg by mouth Daily.     • Toustephenie Max SoloStar 300 UNIT/ML solution pen-injector injection INJECT 75 80 UNITS INTO THE SKIN TWICE A DAY     • Victoza 18 MG/3ML solution pen-injector injection INJECT 1.8 MG UNDER THE SKIN ONCE DAILY     • vitamin D (ERGOCALCIFEROL) 1.25 MG (21384 UT) capsule capsule Take 100,000 Units by mouth Every 7 (Seven) Days.       No current facility-administered medications on file prior to visit.       Objective   /79   Pulse 77   Ht 185.4 cm (73\")   Wt 128 kg (282 lb)   BMI 37.21 kg/m²     General:   Appearance: appears stated age and healthy    Orientation: AAOx3    Affect: appropriate    Assistance: knee scooter       VASCULAR       Right Foot Vascularity   Normal vascular exam    Dorsalis pedis:  2+  Posterior tibial:  2+  Skin Temperature: warm    Edema Grading:  None  CFT:  < 3 seconds  Pedal Hair Growth:  Present  Varicosities: none        NEUROLOGIC      Right Foot Neurologic   Light touch sensation:  Diminished  Hot/Cold sensation: diminished    Protective Sensation using [place] Monofilament:  Diminished  Achilles reflex:  1+      MUSCULOSKELETAL       Right Foot Musculoskeletal   Ecchymosis:  None  Tenderness: none    Arch:  Charcot      MUSCLE STRENGTH      Right Foot Muscle Strength   Normal strength    Foot dorsiflexion:  5  Foot plantar flexion:  5  Foot inversion:  5  Foot eversion:  5    Lateral incision and plantar wound are well-healed at this time.  No other complicating features    Assessment/Plan   Diagnoses and all orders for this visit:    1. Charcot's joint of right foot (Primary)    2. DM type 2 with diabetic peripheral neuropathy (CMS/HCC)      Patient is doing very well at this time.  The plantar foot wound has finally healed.  He has no other areas of concern.  I have recommended that he obtain new diabetic shoes and inserts from [facility].  I would like him to gradually increase activity to baseline.  We did discuss the importance " of continued daily foot checks and glycemic control.  He is to monitor closely and call with any issues or concerns and I will see him in 3 months for routine foot check.    No orders of the defined types were placed in this encounter.         Note is dictated utilizing voice recognition software. Unfortunately this leads to occasional typographical errors. I apologize in advance if the situation occurs. If questions occur please do not hesitate to call our office.

## 2021-04-27 ENCOUNTER — TELEPHONE (OUTPATIENT)
Dept: PODIATRY | Facility: CLINIC | Age: 61
End: 2021-04-27

## 2021-04-27 NOTE — TELEPHONE ENCOUNTER
Provider: DR. MURPHY  Caller: GREGORY TREVINO  Relationship to Patient: SELF  Phone Number: [phone]  Reason for Call: PT ADVISED [facility] NOT [facility] THE PRES[facility] FOR PT'S INSOLES. PT REQUESTED TO HAVE ORDER RESENT.

## 2021-07-06 ENCOUNTER — OFFICE VISIT (OUTPATIENT)
Dept: PODIATRY | Facility: CLINIC | Age: 61
End: 2021-07-06

## 2021-07-06 VITALS
BODY MASS INDEX: 36.45 KG/M2 | HEIGHT: 73 IN | SYSTOLIC BLOOD PRESSURE: 144 MMHG | WEIGHT: 275 LBS | HEART RATE: 71 BPM | DIASTOLIC BLOOD PRESSURE: 84 MMHG

## 2021-07-06 DIAGNOSIS — E11.42 DM TYPE 2 WITH DIABETIC PERIPHERAL NEUROPATHY (HCC): ICD-10-CM

## 2021-07-06 DIAGNOSIS — M14.671 CHARCOT'S JOINT OF RIGHT FOOT: Primary | ICD-10-CM

## 2021-07-06 PROCEDURE — 99213 OFFICE O/P EST LOW 20 MIN: CPT | Performed by: PODIATRIST

## 2021-07-06 RX ORDER — INSULIN GLARGINE 300 U/ML
75-80 INJECTION, SOLUTION SUBCUTANEOUS
COMMUNITY
Start: 2021-06-21 | End: 2021-09-19

## 2021-07-06 RX ORDER — METOPROLOL SUCCINATE 100 MG/1
150 TABLET, EXTENDED RELEASE ORAL DAILY
COMMUNITY
Start: 2021-05-04 | End: 2021-08-02

## 2021-07-06 RX ORDER — DULAGLUTIDE 0.75 MG/.5ML
INJECTION, SOLUTION SUBCUTANEOUS
COMMUNITY
Start: 2021-05-26 | End: 2021-07-06 | Stop reason: ALTCHOICE

## 2021-07-06 NOTE — PROGRESS NOTES
07/06/2021  Foot and Ankle Surgery - Established Patient/Follow-up  Provider: Dr. Robbi Pineda DPM  Location: [facility]    Subjective:  Agapiot Canseco is a 60 y.o. male.     Chief Complaint   Patient presents with   • Right Foot - Follow-up       HPI: Patient returns for routine diabetic foot check and evaluation of the right lower extremity.  He states that he is doing quite well.  He has been wearing the diabetic shoes and inserts on a daily basis.  He has not had any significant callusing or other concerning features.  He states that his blood sugars have remained well controlled and he does have an upcoming appointment for lab work.  His most recent A1c was less than 7%.    No Known Allergies    Current Outpatient Medications on File Prior to Visit   Medication Sig Dispense Refill   • Insulin Glargine, 2 Unit Dial, (Toujeo Max SoloStar) 300 UNIT/ML solution pen-injector injection Inject 75-80 Units under the skin into the appropriate area as directed.     • metoprolol succinate XL (TOPROL-XL) 100 MG 24 hr tablet Take 150 mg by mouth Daily.     • amLODIPine (NORVASC) 10 MG tablet      • aspirin 81 MG chewable tablet Chew 81 mg Daily.     • atorvastatin (LIPITOR) 40 MG tablet Take 40 mg by mouth Daily.     • Continuous Blood Gluc Sensor (FreeStyle Shilo 14 Day Sensor) misc      • Empagliflozin (Jardiance) 25 MG tablet Take 25 mg by mouth Daily. Do not take dos     • losartan (COZAAR) 100 MG tablet Take 100 mg by mouth Daily.     • metFORMIN (GLUCOPHAGE) 1000 MG tablet Take 1,000 mg by mouth 2 (Two) Times a Day With Meals. Do not take 48 hours prior to surgery.  LD 2-3 before 1045     • pantoprazole (PROTONIX) 40 MG EC tablet Take 40 mg by mouth Daily. Take dos     • pioglitazone (ACTOS) 30 MG tablet Take 30 mg by mouth Every Morning. Do not take dos     • vitamin D (ERGOCALCIFEROL) 1.25 MG (68772 UT) capsule capsule Take 100,000 Units by mouth Every 7 (Seven) Days.     • [DISCONTINUED] Insulin  "Degludec (TRESIBA) 100 UNIT/ML solution injection Inject 152 Units under the skin into the appropriate area as directed Daily. Patient takes 2 injections of 76 units of Tresiba in the morning, 76 units on one side of the abdomen and 76 units on the other side of the abdomen for a total of 152 units daily in the AM   Take dos     • [DISCONTINUED] metoprolol succinate XL (TOPROL-XL) 50 MG 24 hr tablet Take 100 mg by mouth every night at bedtime.     • [DISCONTINUED] raNITIdine (ZANTAC) 150 MG tablet Take 150 mg by mouth Daily.     • [DISCONTINUED] Toujeo Max SoloStar 300 UNIT/ML solution pen-injector injection INJECT 75 80 UNITS INTO THE SKIN TWICE A DAY     • [DISCONTINUED] Trulicity 0.75 MG/0.5ML solution pen-injector INJECT 0.75 MG INTO THE SKIN ONCE A WEEK.     • [DISCONTINUED] Victoza 18 MG/3ML solution pen-injector injection INJECT 1.8 MG UNDER THE SKIN ONCE DAILY       No current facility-administered medications on file prior to visit.       Objective   /84   Pulse 71   Ht 185.4 cm (73\")   Wt 125 kg (275 lb)   BMI 36.28 kg/m²     General:   Appearance: appears stated age and healthy    Orientation: AAOx3    Affect: appropriate    Assistance: knee scooter       VASCULAR      Foot Vascularity   Normal vascular exam    Dorsalis pedis:  2+  Posterior tibial:  2+  Skin Temperature: warm    Edema Grading:  None  CFT:  < 3 seconds  Pedal Hair Growth:  Present  Varicosities: none        NEUROLOGIC      Foot Neurologic   Light touch sensation:  Diminished  Hot/Cold sensation: diminished    Protective Sensation using [place]-Omar Monofilament:  Diminished  Achilles reflex:  1+      MUSCULOSKELETAL       Foot Musculoskeletal   Ecchymosis:  None  Tenderness: none    Arch: Charcot foot deformity involving the right.  Rectus orientation involving the left foot      MUSCLE STRENGTH       Foot Muscle Strength   Normal strength    Foot dorsiflexion:  5  Foot plantar flexion:  5  Foot inversion:  5  Foot " eversion:  5     No open wounds or signs of infection involving the feet.  The plantar soft tissue involving the right foot has continued to modify.    Assessment/Plan   Diagnoses and all orders for this visit:    1. Charcot's joint of right foot (Primary)    2. DM type 2 with diabetic peripheral neuropathy (CMS/HCC)      Patient appears to be doing quite well at this time.  No complicating features are noted to either foot.  He has continued to modify the plantar soft tissues involving the right lower extremity.  He has no callusing or other complicating issues.  I continue to feel that he is at high risk of pedal complications but overall doing well.  We did discuss the importance of daily diabetic foot checks and glycemic control.  I do feel that it is appropriate for him to follow-up with me in 1 year for annual exam.  He is to call with any additional issues or concerns    No orders of the defined types were placed in this encounter.         Note is dictated utilizing voice recognition software. Unfortunately this leads to occasional typographical errors. I apologize in advance if the situation occurs. If questions occur please do not hesitate to call our office.

## 2021-11-18 ENCOUNTER — ON CAMPUS - OUTPATIENT (AMBULATORY)
Dept: URBAN - METROPOLITAN AREA HOSPITAL 2 | Facility: HOSPITAL | Age: 61
End: 2021-11-18
Payer: COMMERCIAL

## 2021-11-18 ENCOUNTER — OFFICE (AMBULATORY)
Dept: URBAN - METROPOLITAN AREA PATHOLOGY 4 | Facility: PATHOLOGY | Age: 61
End: 2021-11-18
Payer: COMMERCIAL

## 2021-11-18 VITALS
HEIGHT: 73 IN | HEART RATE: 17 BPM | DIASTOLIC BLOOD PRESSURE: 65 MMHG | SYSTOLIC BLOOD PRESSURE: 139 MMHG | SYSTOLIC BLOOD PRESSURE: 127 MMHG | RESPIRATION RATE: 16 BRPM | DIASTOLIC BLOOD PRESSURE: 88 MMHG | SYSTOLIC BLOOD PRESSURE: 120 MMHG | SYSTOLIC BLOOD PRESSURE: 137 MMHG | SYSTOLIC BLOOD PRESSURE: 93 MMHG | DIASTOLIC BLOOD PRESSURE: 63 MMHG | TEMPERATURE: 96.9 F | DIASTOLIC BLOOD PRESSURE: 74 MMHG | HEART RATE: 70 BPM | RESPIRATION RATE: 15 BRPM | DIASTOLIC BLOOD PRESSURE: 79 MMHG | OXYGEN SATURATION: 99 % | SYSTOLIC BLOOD PRESSURE: 122 MMHG | HEART RATE: 58 BPM | DIASTOLIC BLOOD PRESSURE: 78 MMHG | WEIGHT: 272 LBS | HEART RATE: 66 BPM | OXYGEN SATURATION: 100 % | DIASTOLIC BLOOD PRESSURE: 60 MMHG | OXYGEN SATURATION: 97 % | RESPIRATION RATE: 18 BRPM | SYSTOLIC BLOOD PRESSURE: 99 MMHG | HEART RATE: 65 BPM | OXYGEN SATURATION: 98 % | HEART RATE: 62 BPM | HEART RATE: 59 BPM | DIASTOLIC BLOOD PRESSURE: 76 MMHG | HEART RATE: 60 BPM | SYSTOLIC BLOOD PRESSURE: 123 MMHG

## 2021-11-18 DIAGNOSIS — Z86.010 PERSONAL HISTORY OF COLONIC POLYPS: ICD-10-CM

## 2021-11-18 DIAGNOSIS — D12.3 BENIGN NEOPLASM OF TRANSVERSE COLON: ICD-10-CM

## 2021-11-18 DIAGNOSIS — D12.5 BENIGN NEOPLASM OF SIGMOID COLON: ICD-10-CM

## 2021-11-18 PROBLEM — K63.5 POLYP OF COLON: Status: ACTIVE | Noted: 2021-11-18

## 2021-11-18 LAB
GI HISTOLOGY: A. UNSPECIFIED: (no result)
GI HISTOLOGY: B. UNSPECIFIED: (no result)
GI HISTOLOGY: PDF REPORT: (no result)

## 2021-11-18 PROCEDURE — 88305 TISSUE EXAM BY PATHOLOGIST: CPT | Mod: 33 | Performed by: INTERNAL MEDICINE

## 2021-11-18 PROCEDURE — 45385 COLONOSCOPY W/LESION REMOVAL: CPT | Mod: 33 | Performed by: INTERNAL MEDICINE

## 2023-05-08 ENCOUNTER — HOSPITAL ENCOUNTER (OUTPATIENT)
Facility: HOSPITAL | Age: 63
Setting detail: OBSERVATION
Discharge: HOME | End: 2023-05-10
Attending: EMERGENCY MEDICINE | Admitting: HOSPITALIST
Payer: COMMERCIAL

## 2023-05-08 ENCOUNTER — APPOINTMENT (OUTPATIENT)
Dept: MRI IMAGING | Facility: HOSPITAL | Age: 63
End: 2023-05-08
Payer: COMMERCIAL

## 2023-05-08 ENCOUNTER — APPOINTMENT (OUTPATIENT)
Dept: CARDIOLOGY | Facility: HOSPITAL | Age: 63
End: 2023-05-08
Payer: COMMERCIAL

## 2023-05-08 ENCOUNTER — APPOINTMENT (OUTPATIENT)
Dept: GENERAL RADIOLOGY | Facility: HOSPITAL | Age: 63
End: 2023-05-08
Payer: COMMERCIAL

## 2023-05-08 DIAGNOSIS — M14.671 CHARCOT'S JOINT OF RIGHT FOOT: ICD-10-CM

## 2023-05-08 DIAGNOSIS — L02.611 ABSCESS OF RIGHT FOOT: Primary | ICD-10-CM

## 2023-05-08 DIAGNOSIS — Z86.39 HISTORY OF DIABETES MELLITUS: ICD-10-CM

## 2023-05-08 LAB
ALBUMIN SERPL-MCNC: 4.2 G/DL (ref 3.5–5.2)
ALBUMIN/GLOB SERPL: 1.4 G/DL
ALP SERPL-CCNC: 62 U/L (ref 39–117)
ALT SERPL W P-5'-P-CCNC: 22 U/L (ref 1–41)
ANION GAP SERPL CALCULATED.3IONS-SCNC: 11 MMOL/L (ref 5–15)
AST SERPL-CCNC: 21 U/L (ref 1–40)
BASOPHILS # BLD AUTO: 0.1 10*3/MM3 (ref 0–0.2)
BASOPHILS NFR BLD AUTO: 0.7 % (ref 0–1.5)
BH CV LOWER VASCULAR LEFT COMMON FEMORAL AUGMENT: NORMAL
BH CV LOWER VASCULAR LEFT COMMON FEMORAL COMPETENT: NORMAL
BH CV LOWER VASCULAR LEFT COMMON FEMORAL COMPRESS: NORMAL
BH CV LOWER VASCULAR LEFT COMMON FEMORAL PHASIC: NORMAL
BH CV LOWER VASCULAR LEFT COMMON FEMORAL SPONT: NORMAL
BH CV LOWER VASCULAR RIGHT COMMON FEMORAL AUGMENT: NORMAL
BH CV LOWER VASCULAR RIGHT COMMON FEMORAL COMPETENT: NORMAL
BH CV LOWER VASCULAR RIGHT COMMON FEMORAL COMPRESS: NORMAL
BH CV LOWER VASCULAR RIGHT COMMON FEMORAL PHASIC: NORMAL
BH CV LOWER VASCULAR RIGHT COMMON FEMORAL SPONT: NORMAL
BH CV LOWER VASCULAR RIGHT DISTAL FEMORAL COMPRESS: NORMAL
BH CV LOWER VASCULAR RIGHT GASTRONEMIUS COMPRESS: NORMAL
BH CV LOWER VASCULAR RIGHT GREATER SAPH AK COMPRESS: NORMAL
BH CV LOWER VASCULAR RIGHT GREATER SAPH BK COMPRESS: NORMAL
BH CV LOWER VASCULAR RIGHT LESSER SAPH COMPRESS: NORMAL
BH CV LOWER VASCULAR RIGHT MID FEMORAL AUGMENT: NORMAL
BH CV LOWER VASCULAR RIGHT MID FEMORAL COMPETENT: NORMAL
BH CV LOWER VASCULAR RIGHT MID FEMORAL COMPRESS: NORMAL
BH CV LOWER VASCULAR RIGHT MID FEMORAL PHASIC: NORMAL
BH CV LOWER VASCULAR RIGHT MID FEMORAL SPONT: NORMAL
BH CV LOWER VASCULAR RIGHT PERONEAL COMPRESS: NORMAL
BH CV LOWER VASCULAR RIGHT POPLITEAL AUGMENT: NORMAL
BH CV LOWER VASCULAR RIGHT POPLITEAL COMPETENT: NORMAL
BH CV LOWER VASCULAR RIGHT POPLITEAL COMPRESS: NORMAL
BH CV LOWER VASCULAR RIGHT POPLITEAL PHASIC: NORMAL
BH CV LOWER VASCULAR RIGHT POPLITEAL SPONT: NORMAL
BH CV LOWER VASCULAR RIGHT POSTERIOR TIBIAL COMPRESS: NORMAL
BH CV LOWER VASCULAR RIGHT PROXIMAL FEMORAL COMPRESS: NORMAL
BH CV LOWER VASCULAR RIGHT SAPHENOFEMORAL JUNCTION COMPRESS: NORMAL
BILIRUB SERPL-MCNC: 0.8 MG/DL (ref 0–1.2)
BUN SERPL-MCNC: 18 MG/DL (ref 8–23)
BUN/CREAT SERPL: 13.3 (ref 7–25)
CALCIUM SPEC-SCNC: 9.4 MG/DL (ref 8.6–10.5)
CHLORIDE SERPL-SCNC: 103 MMOL/L (ref 98–107)
CO2 SERPL-SCNC: 23 MMOL/L (ref 22–29)
CREAT SERPL-MCNC: 1.35 MG/DL (ref 0.76–1.27)
CRP SERPL-MCNC: 2.63 MG/DL (ref 0–0.5)
DEPRECATED RDW RBC AUTO: 47.3 FL (ref 37–54)
EGFRCR SERPLBLD CKD-EPI 2021: 59.7 ML/MIN/1.73
EOSINOPHIL # BLD AUTO: 0.3 10*3/MM3 (ref 0–0.4)
EOSINOPHIL NFR BLD AUTO: 2.8 % (ref 0.3–6.2)
ERYTHROCYTE [DISTWIDTH] IN BLOOD BY AUTOMATED COUNT: 14.4 % (ref 12.3–15.4)
ERYTHROCYTE [SEDIMENTATION RATE] IN BLOOD: 45 MM/HR (ref 0–20)
GLOBULIN UR ELPH-MCNC: 2.9 GM/DL
GLUCOSE BLDC GLUCOMTR-MCNC: 127 MG/DL (ref 70–105)
GLUCOSE BLDC GLUCOMTR-MCNC: 71 MG/DL (ref 70–105)
GLUCOSE SERPL-MCNC: 137 MG/DL (ref 65–99)
HCT VFR BLD AUTO: 39.8 % (ref 37.5–51)
HGB BLD-MCNC: 13.4 G/DL (ref 13–17.7)
HOLD SPECIMEN: NORMAL
LYMPHOCYTES # BLD AUTO: 1.2 10*3/MM3 (ref 0.7–3.1)
LYMPHOCYTES NFR BLD AUTO: 12.2 % (ref 19.6–45.3)
MAXIMAL PREDICTED HEART RATE: 159 BPM
MCH RBC QN AUTO: 30.1 PG (ref 26.6–33)
MCHC RBC AUTO-ENTMCNC: 33.6 G/DL (ref 31.5–35.7)
MCV RBC AUTO: 89.8 FL (ref 79–97)
MONOCYTES # BLD AUTO: 1.2 10*3/MM3 (ref 0.1–0.9)
MONOCYTES NFR BLD AUTO: 11.6 % (ref 5–12)
NEUTROPHILS NFR BLD AUTO: 7.2 10*3/MM3 (ref 1.7–7)
NEUTROPHILS NFR BLD AUTO: 72.7 % (ref 42.7–76)
NRBC BLD AUTO-RTO: 0 /100 WBC (ref 0–0.2)
PLATELET # BLD AUTO: 185 10*3/MM3 (ref 140–450)
PMV BLD AUTO: 9.3 FL (ref 6–12)
POTASSIUM SERPL-SCNC: 4.1 MMOL/L (ref 3.5–5.2)
PROT SERPL-MCNC: 7.1 G/DL (ref 6–8.5)
RBC # BLD AUTO: 4.44 10*6/MM3 (ref 4.14–5.8)
SODIUM SERPL-SCNC: 137 MMOL/L (ref 136–145)
STRESS TARGET HR: 135 BPM
WBC NRBC COR # BLD: 9.9 10*3/MM3 (ref 3.4–10.8)
WHOLE BLOOD HOLD COAG: NORMAL

## 2023-05-08 PROCEDURE — 96365 THER/PROPH/DIAG IV INF INIT: CPT

## 2023-05-08 PROCEDURE — 73720 MRI LWR EXTREMITY W/O&W/DYE: CPT

## 2023-05-08 PROCEDURE — 96367 TX/PROPH/DG ADDL SEQ IV INF: CPT

## 2023-05-08 PROCEDURE — 96366 THER/PROPH/DIAG IV INF ADDON: CPT

## 2023-05-08 PROCEDURE — 25010000002 ENOXAPARIN PER 10 MG: Performed by: HOSPITALIST

## 2023-05-08 PROCEDURE — 73630 X-RAY EXAM OF FOOT: CPT

## 2023-05-08 PROCEDURE — G0378 HOSPITAL OBSERVATION PER HR: HCPCS

## 2023-05-08 PROCEDURE — 25010000002 VANCOMYCIN HCL IN NACL 2-0.9 GM/500ML-% SOLUTION: Performed by: NURSE PRACTITIONER

## 2023-05-08 PROCEDURE — 25010000002 GADOTERIDOL PER 1 ML: Performed by: EMERGENCY MEDICINE

## 2023-05-08 PROCEDURE — 99285 EMERGENCY DEPT VISIT HI MDM: CPT

## 2023-05-08 PROCEDURE — 96372 THER/PROPH/DIAG INJ SC/IM: CPT

## 2023-05-08 PROCEDURE — 82948 REAGENT STRIP/BLOOD GLUCOSE: CPT

## 2023-05-08 PROCEDURE — 25010000002 CEFTRIAXONE PER 250 MG: Performed by: NURSE PRACTITIONER

## 2023-05-08 PROCEDURE — A9579 GAD-BASE MR CONTRAST NOS,1ML: HCPCS | Performed by: EMERGENCY MEDICINE

## 2023-05-08 PROCEDURE — 87040 BLOOD CULTURE FOR BACTERIA: CPT | Performed by: NURSE PRACTITIONER

## 2023-05-08 PROCEDURE — 85025 COMPLETE CBC W/AUTO DIFF WBC: CPT | Performed by: NURSE PRACTITIONER

## 2023-05-08 PROCEDURE — 80053 COMPREHEN METABOLIC PANEL: CPT | Performed by: NURSE PRACTITIONER

## 2023-05-08 PROCEDURE — 93971 EXTREMITY STUDY: CPT

## 2023-05-08 PROCEDURE — 86140 C-REACTIVE PROTEIN: CPT | Performed by: NURSE PRACTITIONER

## 2023-05-08 PROCEDURE — 85652 RBC SED RATE AUTOMATED: CPT | Performed by: NURSE PRACTITIONER

## 2023-05-08 PROCEDURE — 36415 COLL VENOUS BLD VENIPUNCTURE: CPT

## 2023-05-08 RX ORDER — AMLODIPINE BESYLATE 5 MG/1
10 TABLET ORAL DAILY
Status: DISCONTINUED | OUTPATIENT
Start: 2023-05-09 | End: 2023-05-09

## 2023-05-08 RX ORDER — PIOGLITAZONEHYDROCHLORIDE 30 MG/1
30 TABLET ORAL EVERY EVENING
Status: DISCONTINUED | OUTPATIENT
Start: 2023-05-08 | End: 2023-05-10 | Stop reason: HOSPADM

## 2023-05-08 RX ORDER — SODIUM CHLORIDE 0.9 % (FLUSH) 0.9 %
10 SYRINGE (ML) INJECTION AS NEEDED
Status: DISCONTINUED | OUTPATIENT
Start: 2023-05-08 | End: 2023-05-10 | Stop reason: HOSPADM

## 2023-05-08 RX ORDER — NITROGLYCERIN 0.4 MG/1
0.4 TABLET SUBLINGUAL
Status: DISCONTINUED | OUTPATIENT
Start: 2023-05-08 | End: 2023-05-10 | Stop reason: HOSPADM

## 2023-05-08 RX ORDER — ERGOCALCIFEROL 1.25 MG/1
50000 CAPSULE ORAL 2 TIMES WEEKLY
Status: DISCONTINUED | OUTPATIENT
Start: 2023-05-11 | End: 2023-05-10 | Stop reason: HOSPADM

## 2023-05-08 RX ORDER — ACETAMINOPHEN 325 MG/1
650 TABLET ORAL EVERY 4 HOURS PRN
Status: DISCONTINUED | OUTPATIENT
Start: 2023-05-08 | End: 2023-05-10 | Stop reason: HOSPADM

## 2023-05-08 RX ORDER — ASPIRIN 81 MG/1
81 TABLET, CHEWABLE ORAL DAILY
Status: DISCONTINUED | OUTPATIENT
Start: 2023-05-09 | End: 2023-05-10 | Stop reason: HOSPADM

## 2023-05-08 RX ORDER — LOSARTAN POTASSIUM 50 MG/1
100 TABLET ORAL EVERY EVENING
Status: DISCONTINUED | OUTPATIENT
Start: 2023-05-08 | End: 2023-05-09

## 2023-05-08 RX ORDER — ONDANSETRON 2 MG/ML
4 INJECTION INTRAMUSCULAR; INTRAVENOUS EVERY 6 HOURS PRN
Status: DISCONTINUED | OUTPATIENT
Start: 2023-05-08 | End: 2023-05-10 | Stop reason: HOSPADM

## 2023-05-08 RX ORDER — ATORVASTATIN CALCIUM 40 MG/1
40 TABLET, FILM COATED ORAL DAILY
Status: DISCONTINUED | OUTPATIENT
Start: 2023-05-09 | End: 2023-05-10 | Stop reason: HOSPADM

## 2023-05-08 RX ORDER — ENOXAPARIN SODIUM 100 MG/ML
40 INJECTION SUBCUTANEOUS EVERY 24 HOURS
Status: DISCONTINUED | OUTPATIENT
Start: 2023-05-08 | End: 2023-05-10 | Stop reason: HOSPADM

## 2023-05-08 RX ORDER — METRONIDAZOLE 500 MG/100ML
500 INJECTION, SOLUTION INTRAVENOUS EVERY 8 HOURS
Status: DISCONTINUED | OUTPATIENT
Start: 2023-05-09 | End: 2023-05-10 | Stop reason: HOSPADM

## 2023-05-08 RX ORDER — INSULIN LISPRO 100 [IU]/ML
2-9 INJECTION, SOLUTION INTRAVENOUS; SUBCUTANEOUS
Status: DISCONTINUED | OUTPATIENT
Start: 2023-05-08 | End: 2023-05-10 | Stop reason: HOSPADM

## 2023-05-08 RX ORDER — NICOTINE POLACRILEX 4 MG
15 LOZENGE BUCCAL
Status: DISCONTINUED | OUTPATIENT
Start: 2023-05-08 | End: 2023-05-10 | Stop reason: HOSPADM

## 2023-05-08 RX ORDER — DULAGLUTIDE 1.5 MG/.5ML
1.5 INJECTION, SOLUTION SUBCUTANEOUS WEEKLY
COMMUNITY

## 2023-05-08 RX ORDER — INSULIN GLARGINE 300 U/ML
64 INJECTION, SOLUTION SUBCUTANEOUS DAILY
COMMUNITY

## 2023-05-08 RX ORDER — VANCOMYCIN 2 GRAM/500 ML IN 0.9 % SODIUM CHLORIDE INTRAVENOUS
2000 ONCE
Status: COMPLETED | OUTPATIENT
Start: 2023-05-08 | End: 2023-05-08

## 2023-05-08 RX ORDER — SODIUM CHLORIDE 9 MG/ML
40 INJECTION, SOLUTION INTRAVENOUS AS NEEDED
Status: DISCONTINUED | OUTPATIENT
Start: 2023-05-08 | End: 2023-05-10 | Stop reason: HOSPADM

## 2023-05-08 RX ORDER — DEXTROSE MONOHYDRATE 25 G/50ML
25 INJECTION, SOLUTION INTRAVENOUS
Status: DISCONTINUED | OUTPATIENT
Start: 2023-05-08 | End: 2023-05-10 | Stop reason: HOSPADM

## 2023-05-08 RX ORDER — IBUPROFEN 600 MG/1
1 TABLET ORAL
Status: DISCONTINUED | OUTPATIENT
Start: 2023-05-08 | End: 2023-05-10 | Stop reason: HOSPADM

## 2023-05-08 RX ORDER — SODIUM CHLORIDE 0.9 % (FLUSH) 0.9 %
10 SYRINGE (ML) INJECTION EVERY 12 HOURS SCHEDULED
Status: DISCONTINUED | OUTPATIENT
Start: 2023-05-08 | End: 2023-05-10 | Stop reason: HOSPADM

## 2023-05-08 RX ORDER — METRONIDAZOLE 500 MG/100ML
500 INJECTION, SOLUTION INTRAVENOUS ONCE
Status: COMPLETED | OUTPATIENT
Start: 2023-05-08 | End: 2023-05-08

## 2023-05-08 RX ORDER — PANTOPRAZOLE SODIUM 40 MG/1
40 TABLET, DELAYED RELEASE ORAL DAILY
Status: DISCONTINUED | OUTPATIENT
Start: 2023-05-09 | End: 2023-05-10 | Stop reason: HOSPADM

## 2023-05-08 RX ADMIN — PIOGLITAZONE 30 MG: 30 TABLET ORAL at 21:08

## 2023-05-08 RX ADMIN — Medication 2000 MG: at 17:58

## 2023-05-08 RX ADMIN — ENOXAPARIN SODIUM 40 MG: 100 INJECTION SUBCUTANEOUS at 17:34

## 2023-05-08 RX ADMIN — CEFTRIAXONE 2 G: 2 INJECTION, POWDER, FOR SOLUTION INTRAMUSCULAR; INTRAVENOUS at 10:03

## 2023-05-08 RX ADMIN — GADOTERIDOL 20 ML: 279.3 INJECTION, SOLUTION INTRAVENOUS at 14:37

## 2023-05-08 RX ADMIN — LOSARTAN POTASSIUM 100 MG: 50 TABLET, FILM COATED ORAL at 19:12

## 2023-05-08 RX ADMIN — METRONIDAZOLE 500 MG: 500 INJECTION, SOLUTION INTRAVENOUS at 17:18

## 2023-05-08 RX ADMIN — Medication 10 ML: at 21:08

## 2023-05-08 NOTE — H&P
[facility] Services  History & Physical    Patient Name: Agapito Canseco  : 1960  MRN: [id number]  Primary Care Physician:  Tete Blank MD  Date of admission: 2023  Date and Time of Service: 2023 at 16:39 EDT    Subjective      Chief Complaint: Foot wound with abscess    History of Present Illness: Agapito Canseco is a 62 y.o. male who presented to [facility] on 2023 complaining of a draining wound to the bottom of the right foot.  He states he noticed it this morning after he got out of the shower.  Does report some discomfort in his lower leg.  He denies any fever.  He states he has just not felt well in the last couple days.  He does report a couple years ago had a similar episode in the right foot that resulted in IV antibiotics and surgery.    XR Foot 3+ View Right    Result Date: 2023  Impression: Focal soft tissue swelling compatible with sequela of infection given patient history. No obvious subcutaneous emphysema or radiopaque graft foreign body identified Advanced degenerative changes centered at the midfoot compatible with Charcot deformity. No definite acute osseous abnormality noted. MRI is more sensitive for evaluation for osteomyelitis Electronically Signed: Yifan Rico  2023 10:19 AM EDT  Workstation ID: [id number]    MRI Foot Right With & Without Contrast    Result Date: 2023  Impression: Significant wound seen along the plantar aspect of the foot which appears to drain to the skin surface with draining sinus tract and extensive phlegmon and abscess formation which appears to surround the central bundle of the plantar fascia. This is confined to the soft tissues and there is no evidence of osseous involvement to suggest osteomyelitis. No suspicious tenosynovitis. Electronically Signed: Savannah William  2023 2:52 PM EDT  Workstation ID: [id number] [employer]        Review of Systems   Skin: Positive for color change and poor  wound healing.        Right plantar foot   All other systems reviewed and are negative.       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   • Diabetic retinopathy     mild   • Elevated cholesterol    • GERD (gastroesophageal reflux disease)    • Hypertension    • NAFLD (nonalcoholic fatty liver disease) 9/20/2017   • PONV (postoperative nausea and vomiting)     x 1   • Swelling of ankle, right 01/2021       Past Surgical History:   Procedure Laterality Date   • CARDIAC CATHETERIZATION      no stent   • FOOT OSTEOTOMY Right 2/5/2021    Procedure: PARTIAL CUBOID RESECTION;  Surgeon: MAX Pineda DPM;  Location: [facility] MA[facility] OR;  Service: Podiatry;  Laterality: Right;   • FOOT SURGERY      left foot tendons and bones   • HERNIA REPAIR      umbilical   • INCISION AND DRAINAGE LEG Right 11/27/2020    Procedure: INCISION AND DRAINAGE LOWER EXTREMITY;  Surgeon: MAX Pineda DPM;  Location: [facility] OR;  Service: Podiatry;  Laterality: Right;   • WOUND DEBRIDEMENT Right 2/5/2021    Procedure: DEBRIDEMENT WOUND;  Surgeon: MAX Pineda DPM;  Location: [facility] OR;  Service: Podiatry;  Laterality: Right;       Family History: family history includes Heart disease in his father. Otherwise pertinent FHx was reviewed and not pertinent to current issue.    Social History:  reports that he has never smoked. He has never used smokeless tobacco. He reports that he does not drink alcohol and does not use drugs.    Home Medications:  Prior to Admission Medications     Prescriptions Last Dose Informant Patient Reported? Taking?    amLODIPine (NORVASC) 10 MG tablet   Yes Yes    Take 1 tablet by mouth Daily.    aspirin 81 MG chewable tablet  Spouse/Significant Other Yes Yes    Chew 1 tablet Daily.    Dulaglutide (Trulicity) 1.5 MG/0.5ML  solution pen-injector   Yes Yes    Inject 1.5 mg under the skin into the appropriate area as directed 1 (One) Time Per Week. Mondays    Empagliflozin (Jardiance) 25 MG tablet   Yes Yes    Take 1 tablet by mouth Daily. Do not take dos    Insulin Glargine, 2 Unit Dial, (Toujeo Max SoloStar) 300 UNIT/ML solution pen-injector injection   Yes Yes    Inject 64 Units under the skin into the appropriate area as directed Daily.    losartan (COZAAR) 100 MG tablet   Yes Yes    Take 1 tablet by mouth Every Evening.    metFORMIN (GLUCOPHAGE) 1000 MG tablet  Spouse/Significant Other Yes Yes    Take 1 tablet by mouth 2 (Two) Times a Day With Meals.    pantoprazole (PROTONIX) 40 MG EC tablet  Spouse/Significant Other Yes Yes    Take 1 tablet by mouth Daily. Take dos    pioglitazone (ACTOS) 30 MG tablet  Spouse/Significant Other Yes Yes    Take 1 tablet by mouth Every Evening. Do not take dos    vitamin D (ERGOCALCIFEROL) 1.25 MG (26065 UT) capsule capsule   Yes Yes    Take 1 capsule by mouth 2 (Two) Times a Week. Sunday and Wednesdays    atorvastatin (LIPITOR) 40 MG tablet  Spouse/Significant Other Yes No    Take 40 mg by mouth Daily.    Continuous Blood Gluc Sensor (FreeStyle Shilo 14 Day Sensor) misc   Yes No            Allergies:  No Known Allergies    Objective      Vitals:   Temp:  [97.8 °F (36.6 °C)] 97.8 °F (36.6 °C)  Heart Rate:  [65-93] 65  Resp:  [14-20] 18  BP: (119-150)/(73-80) 119/73    Physical Exam  Vitals reviewed.   Constitutional:       Appearance: Normal appearance. He is obese.   HENT:      Head: Normocephalic.   Eyes:      Pupils: Pupils are equal, round, and reactive to light.   Cardiovascular:      Rate and Rhythm: Normal rate and regular rhythm.      Pulses: Normal pulses.      Heart sounds: Normal heart sounds.   Pulmonary:      Effort: Pulmonary effort is normal.      Breath sounds: Normal breath sounds.   Abdominal:      General: Bowel sounds are normal.      Palpations: Abdomen is soft.    Musculoskeletal:      Right foot: Swelling and Charcot foot present.   Skin:     General: Skin is warm and dry.          Neurological:      Mental Status: He is alert and oriented to person, place, and time.   Psychiatric:         Behavior: Behavior normal.              Result Review    Result Review:  I have personally reviewed the results from the time of this admission to 5/8/2023 16:38 EDT and agree with these findings:  [x]  Laboratory  [x]  Microbiology  [x]  Radiology  [x]  EKG/Telemetry   []  Cardiology/Vascular   []  Pathology  []  Old records  []  Other:      Assessment & Plan        Active Hospital Problems:  Active Hospital Problems    Diagnosis    • **Abscess of right foot      Plan:     Right foot wound with abscess  Charcot foot  -MRI results reviewed  -Currently afebrile, WBC 9.90, CRP 2.63, sed rate 45  -ID and podiatry consulted  -Flagyl and vancomycin  -Blood cultures pending  -Wound culture ordered  -Wound care nurse consulted  -Monitor daily labs    Type 2 diabetes  -Glucose 137  -Resume Jardiance  -Hold home Trulicity as it is not provided in house and metformin  -SSI and Accu-Cheks before meals and at bedtime  -Consistent carb diet  -Check A1c    Hyperlipidemia  -Check lipid panel  -Continue statin    GERD: PPI  Obesity: Encourage lifestyle modifications    DVT prophylaxis:  Medical DVT prophylaxis orders are present.    CODE STATUS:  Level Of Support Discussed With: Patient  Code Status (Patient has no pulse and is not breathing): CPR (Attempt to Resuscitate)  Medical Interventions (Patient has pulse or is breathing): Full Support         Admission Status:  I believe this patient meets observation status.    I discussed the patient's findings and my recommendations with patient.      Signature: Electronically signed by JACKELYN Ho, 05/08/23, 16:38 EDT.  [facility], [place], operated by [facility] Hospitalist Team

## 2023-05-08 NOTE — ED PROVIDER NOTES
Subjective   History of Present Illness  Patient is a 62-year-old white male with a history of diabetes who presents today with complaints of a draining wound to the plantar surface of the right foot.  He reports chronic ulceration and callus noted to this area for quite some time.  He reports infection in the foot a couple of years ago that required IV antibiotics and admission to the hospital.  States he was doing well until this morning when he got out of the shower and noticed drainage from this area.  He states he has had some discomfort in his lower leg medially.  Reports general fatigue but denies any fever chills nausea or vomiting.        Review of Systems   Constitutional: Positive for fatigue. Negative for fever.   Respiratory: Negative for shortness of breath.    Cardiovascular: Negative for chest pain.   Gastrointestinal: Negative for nausea and vomiting.   Skin: Positive for wound.        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   • Diabetic retinopathy     mild   • Elevated cholesterol    • GERD (gastroesophageal reflux disease)    • Hypertension    • NAFLD (nonalcoholic fatty liver disease) 9/20/2017   • PONV (postoperative nausea and vomiting)     x 1   • Swelling of ankle, right 01/2021       No Known Allergies    Past Surgical History:   Procedure Laterality Date   • CARDIAC CATHETERIZATION      no stent   • FOOT OSTEOTOMY Right 2/5/2021    Procedure: PARTIAL CUBOID RESECTION;  Surgeon: MAX Pineda DPM;  Location: [facility] MAIN OR;  Service: Podiatry;  Laterality: Right;   • FOOT SURGERY      left foot tendons and bones   • HERNIA REPAIR      umbilical   • INCISION AND DRAINAGE LEG Right 11/27/2020    Procedure: INCISION AND DRAINAGE LOWER EXTREMITY;  Surgeon: MAX Pineda DPM;  Location: [facility]  MAIN OR;  Service: Podiatry;  Laterality: Right;   • WOUND DEBRIDEMENT Right 2/5/2021    Procedure: DEBRIDEMENT WOUND;  Surgeon: MAX Pineda DPM;  Location: [facility] MAIN OR;  Service: Podiatry;  Laterality: Right;       Family History   Problem Relation Age of Onset   • Heart disease Father        Social History     Socioeconomic History   • Marital status:    Tobacco Use   • Smoking status: Never   • Smokeless tobacco: Never   Vaping Use   • Vaping Use: Never used   Substance and Sexual Activity   • Alcohol use: No   • Drug use: No   • Sexual activity: Defer           Objective   Physical Exam  Vital signs and triage nurse note reviewed.  Constitutional: Awake, alert; well-developed and well-nourished. No acute distress is noted.  Cardiovascular: Regular rate and rhythm  Pulmonary: Respiratory effort regular nonlabored  Musculoskeletal: Independent range of motion of all extremities.   Callused lesion noted to the plantar surface of the right midfoot.  There does appear to be some purulent material beneath this.  There is no significant surrounding erythema.  No significant tenderness to palpation.  There is trace edema noted in the right leg.  There is no streaking.  Good cap refill distally.  Neuro: Alert oriented x3, speech is clear and appropriate, GCS 15.    Skin: Flesh tone, warm, dry, intact; no erythematous or petechial rash or lesion.         File Link    Scan on 5/8/2023 0917 by Mary Morales APRN        Bell Information    Document ID File Type Document Type Description   [id number] Image WOUND IMAGE      Import Information    Attached At Date Time User Dept   Encounter Level 5/8/2023  9:17 AM Mary Morales APRN [facility] Emergency Dept     Encounter    Hospital Encounter on 5/8/23       Procedures           ED Course      Labs Reviewed   COMPREHENSIVE METABOLIC PANEL - Abnormal; Notable for the following components:       Result Value    Glucose 137 (*)     Creatinine 1.35 (*)      eGFR 59.7 (*)     All other components within normal limits    Narrative:     GFR Normal >60  Chronic Kidney Disease <60  Kidney Failure <15     SEDIMENTATION RATE - Abnormal; Notable for the following components:    Sed Rate 45 (*)     All other components within normal limits   C-REACTIVE PROTEIN - Abnormal; Notable for the following components:    C-Reactive Protein 2.63 (*)     All other components within normal limits   CBC WITH AUTO DIFFERENTIAL - Abnormal; Notable for the following components:    Lymphocyte % 12.2 (*)     Neutrophils, Absolute 7.20 (*)     Monocytes, Absolute 1.20 (*)     All other components within normal limits   CBC AND DIFFERENTIAL    Narrative:     The following orders were created for panel order CBC & Differential.  Procedure                               Abnormality         Status                     ---------                               -----------         ------                     CBC Auto Differential[[id number]]        Abnormal            Final result                 Please view results for these tests on the individual orders.   EXTRA TUBES    Narrative:     The following orders were created for panel order Extra Tubes.  Procedure                               Abnormality         Status                     ---------                               -----------         ------                     Gold Top - SST[[id number]]                                   Final result               Light Blue Top[[id number]]                                   Final result                 Please view results for these tests on the individual orders.   GOLD TOP - SST   LIGHT BLUE TOP     XR Foot 3+ View Right    Result Date: 5/8/2023  Impression: Focal soft tissue swelling compatible with sequela of infection given patient history. No obvious subcutaneous emphysema or radiopaque graft foreign body identified Advanced degenerative changes centered at the midfoot compatible with Charcot deformity. No  definite acute osseous abnormality noted. MRI is more sensitive for evaluation for osteomyelitis Electronically Signed: Yifan Rico  5/8/2023 10:19 AM EDT  Workstation ID: [id number]    Medications   sodium chloride 0.9 % flush 10 mL (has no administration in time range)   cefTRIAXone (ROCEPHIN) 2 g in sodium chloride 0.9 % 100 mL IVPB (0 g Intravenous Stopped 5/8/23 1059)                                          MDM    Final diagnoses:   None       ED Disposition  ED Disposition     None          No follow-up provider specified.       Medication List      No changes were made to your prescriptions during this visit.

## 2023-05-08 NOTE — PROGRESS NOTES
"Pharmacy Antimicrobial Dosing Service    Subjective:  Agapito Canseco is a 62 y.o.male admitted with draining right foot wound. Pharmacy has been consulted to dose Vancomycin for possible SSTI.    PMH: DM2      Assessment/Plan    1. Day #1 Vancomycin: Goal -600 mcg*h/mL. Loading dose of 2 grams IV (~20.8 mg/kg DBW) given in the ED. Will initiate maintenance regimen of 1500 mg IV (~15.5 mg/kg DBW) q12h thereafter and obtain levels for therapeutic drug monitoring tomorrow after the 2nd and prior to the third dose.     2. Day #1 Ceftriaxone: 2 grams IV q24h    3. Day #1 Metronidazole: 500 mg q8h    Will continue to monitor drug levels, renal function, culture and sensitivities, and patient clinical status.       Objective:  Relevant clinical data and objective history reviewed:  185.4 cm (73\")   120 kg (265 lb)   Ideal body weight: 79.9 kg (176 lb 2.4 oz)  Adjusted ideal body weight: 96 kg (211 lb 11 oz)  Body mass index is 34.96 kg/m².        Results from last 7 days   Lab Units 05/08/23  1001   CREATININE mg/dL 1.35*     Estimated Creatinine Clearance: 77.9 mL/min (A) (by C-G formula based on SCr of 1.35 mg/dL (H)).  No intake/output data recorded.    Results from last 7 days   Lab Units 05/08/23  1001   WBC 10*3/mm3 9.90     Temperature    05/08/23 0903   Temp: 97.8 °F (36.6 °C)     Baseline culture/source/susceptibility:  Microbiology Results (last 10 days)       ** No results found for the last 240 hours. **            Anti-Infectives (From admission, onward)      Ordered     Dose/Rate Route Frequency Start Stop    05/08/23 1730  cefTRIAXone (ROCEPHIN) 1 g in sodium chloride 0.9 % 100 mL IVPB        Ordering Provider: Emy Lerma MD    1 g  200 mL/hr over 30 Minutes Intravenous Every 24 Hours 05/09/23 0900 05/16/23 0859    05/08/23 1730  metroNIDAZOLE (FLAGYL) IVPB 500 mg        Ordering Provider: Emy Lerma MD    500 mg  over 30 Minutes Intravenous Every 8 Hours 05/08/23 2300 05/15/23 2259    " 05/08/23 1730  Pharmacy to dose vancomycin        Ordering Provider: Emy Lerma MD     Does not apply Continuous PRN 05/08/23 1730 05/15/23 1729    05/08/23 1512  vancomycin IVPB 2000 mg in 0.9% Sodium Chloride (premix) 500 mL        Ordering Provider: Mary Morales APRN    2,000 mg Intravenous Once 05/08/23 1514      05/08/23 1512  metroNIDAZOLE (FLAGYL) IVPB 500 mg        Ordering Provider: Mary Morales APRN    500 mg  over 30 Minutes Intravenous Once 05/08/23 1514      05/08/23 0920  cefTRIAXone (ROCEPHIN) 2 g in sodium chloride 0.9 % 100 mL IVPB        Ordering Provider: Mary Morales APRN    2 g  200 mL/hr over 30 Minutes Intravenous Once 05/08/23 0922 05/08/23 1059            Casi Mancilla [facility]  05/08/23 17:46 EDT

## 2023-05-08 NOTE — CASE MANAGEMENT/SOCIAL WORK
Discharge Planning Assessment   Rob     Patient Name: Agapito Canseco  MRN: [id number]  Today's Date: 5/8/2023    Admit Date: 5/8/2023    Plan: Home, Watch for IV ATB or Wound care needs   Discharge Needs Assessment     Row Name 05/08/23 1630       Living Environment    People in Home spouse    Current Living Arrangements home    Potentially Unsafe Housing Conditions none    Primary Care Provided by self    Provides Primary Care For no one    Able to Return to Prior Arrangements yes       Resource/Environmental Concerns    Resource/Environmental Concerns none    Transportation Concerns none       Food Insecurity    Within the past 12 months, you worried that your food would run out before you got the money to buy more. Never true    Within the past 12 months, the food you bought just didn't last and you didn't have money to get more. Never true       Transition Planning    Patient/Family Anticipates Transition to home with family    Patient/Family Anticipated Services at Transition none    Transportation Anticipated family or friend will provide       Discharge Needs Assessment    Readmission Within the Last 30 Days no previous admission in last 30 days    Equipment Currently Used at Home bp cuff    Concerns to be Addressed denies needs/concerns at this time    Anticipated Changes Related to Illness none    Equipment Needed After Discharge none               Discharge Plan     Row Name 05/08/23 1630       Plan    Plan Home, Watch for IV ATB or Wound care needs    Patient/Family in Agreement with Plan yes    Plan Comments Met with Patient at bedside Lives at home with wife. IADL's PCP and PHarmacy verified, able to afford medications. Normally drives self. D/C BArriers: IV ATB, Podiatry consult              Continued Care and Services - Admitted Since 5/8/2023    Coordination has not been started for this encounter.       Expected Discharge Date and Time     Expected Discharge Date Expected Discharge Time    May  9, 2023          Demographic Summary     Row Name 05/08/23 1630       General Information    Admission Type observation    Arrived From emergency department    Referral Source admission list    Reason for Consult discharge planning    Preferred Language English               Functional Status     Row Name 05/08/23 1630       Functional Status    Usual Activity Tolerance good    Current Activity Tolerance good       Functional Status, IADL    Medications independent    Meal Preparation independent    Housekeeping independent    Laundry independent    Shopping independent       Mental Status    General Appearance WDL WDL       Mental Status Summary    Recent Changes in Mental Status/Cognitive Functioning no changes              Met with patient at bedside wearing mask and goggles, Spent less than 15 minutes in room at greater than 6 feet distance.       Shawnee Murphy, RN

## 2023-05-08 NOTE — ED PROVIDER NOTES
Subjective   History of Present Illness  Patient is a 62-year-old white male with history of diabetes, hypertension, high cholesterol, Charcot joint of the right foot.  He presents today from home with complaints of a draining wound to the bottom of the right foot.  He states he noticed it this morning after he got out of the shower.  Does report some discomfort in his lower leg.  He denies any fever.  He states he has just not felt well in the last couple days.  He does report a couple years ago had a similar episode in the right foot that resulted in IV antibiotics and surgery.        Review of Systems   Constitutional: Positive for fatigue. Negative for chills and fever.   Respiratory: Negative for shortness of breath.    Cardiovascular: Negative for chest pain.   Gastrointestinal: Negative for abdominal pain, nausea and vomiting.   Skin: Positive for wound.        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   • Diabetic retinopathy     mild   • Elevated cholesterol    • GERD (gastroesophageal reflux disease)    • Hypertension    • NAFLD (nonalcoholic fatty liver disease) 9/20/2017   • PONV (postoperative nausea and vomiting)     x 1   • Swelling of ankle, right 01/2021       No Known Allergies    Past Surgical History:   Procedure Laterality Date   • CARDIAC CATHETERIZATION      no stent   • FOOT OSTEOTOMY Right 2/5/2021    Procedure: PARTIAL CUBOID RESECTION;  Surgeon: MAX Pineda DPM;  Location: [facility] OR;  Service: Podiatry;  Laterality: Right;   • FOOT SURGERY      left foot tendons and bones   • HERNIA REPAIR      umbilical   • INCISION AND DRAINAGE LEG Right 11/27/2020    Procedure: INCISION AND DRAINAGE LOWER EXTREMITY;  Surgeon: MAX Pineda DPM;  Location: [facility] OR;  Service: Podiatry;   Laterality: Right;   • WOUND DEBRIDEMENT Right 2/5/2021    Procedure: DEBRIDEMENT WOUND;  Surgeon: MAX Pineda DPM;  Location: [facility] MAIN OR;  Service: Podiatry;  Laterality: Right;       Family History   Problem Relation Age of Onset   • Heart disease Father        Social History     Socioeconomic History   • Marital status:    Tobacco Use   • Smoking status: Never   • Smokeless tobacco: Never   Vaping Use   • Vaping Use: Never used   Substance and Sexual Activity   • Alcohol use: No   • Drug use: No   • Sexual activity: Defer           Objective   Physical Exam  Vital signs and triage nurse note reviewed.  Constitutional: Awake, alert; well-developed and well-nourished. No acute distress is noted.  HEENT: Normocephalic, atraumatic; pupils are PERRL with intact EOM; oropharynx is pink and moist without exudate or erythema.  No drooling or pooling of oral secretions.  Neck: Supple, full range of motion without pain; no cervical lymphadenopathy. Normal phonation.  Cardiovascular: Regular rate and rhythm, normal S1-S2.  No murmur noted.  Pulmonary: Respiratory effort regular nonlabored, breath sounds clear to auscultation all fields.  Abdomen: Soft, nontender, nondistended with normoactive bowel sounds; no rebound or guarding.  Musculoskeletal: Independent range of motion of all extremities.  Silver dollar sized.  To the plantar surface of the right foot appears to have some purulent material deep under the skin.  Scant active drainage.  No bleeding.  There is no significant surrounding erythema or streaking.  There is trace edema noted in the right lower leg.  Neuro: Alert oriented x3, speech is clear and appropriate, GCS 15.    Skin: Flesh tone, warm, dry.        File Link    Scan on 5/8/2023 0917 by Mary Morales APRN        Bell Information    Document ID File Type Document Type Description   [id number] Image WOUND IMAGE      Import Information    Attached At Date Time User Dept    Encounter Level 5/8/2023  9:17 AM Mary Morales APRN [facility] Emergency Dept     Encounter    Hospital Encounter on 5/8/23         Procedures           ED Course  ED Course as of 05/08/23 1536   Mon May 08, 2023   1211 Awaiting MRI [MD]   1400 Still awaiting MRI   [MD]      ED Course User Index  [MD] Mary Morales APRN      Labs Reviewed   COMPREHENSIVE METABOLIC PANEL - Abnormal; Notable for the following components:       Result Value    Glucose 137 (*)     Creatinine 1.35 (*)     eGFR 59.7 (*)     All other components within normal limits    Narrative:     GFR Normal >60  Chronic Kidney Disease <60  Kidney Failure <15     SEDIMENTATION RATE - Abnormal; Notable for the following components:    Sed Rate 45 (*)     All other components within normal limits   C-REACTIVE PROTEIN - Abnormal; Notable for the following components:    C-Reactive Protein 2.63 (*)     All other components within normal limits   CBC WITH AUTO DIFFERENTIAL - Abnormal; Notable for the following components:    Lymphocyte % 12.2 (*)     Neutrophils, Absolute 7.20 (*)     Monocytes, Absolute 1.20 (*)     All other components within normal limits   BLOOD CULTURE   BLOOD CULTURE   CBC AND DIFFERENTIAL    Narrative:     The following orders were created for panel order CBC & Differential.  Procedure                               Abnormality         Status                     ---------                               -----------         ------                     CBC Auto Differential[[id number]]        Abnormal            Final result                 Please view results for these tests on the individual orders.   EXTRA TUBES    Narrative:     The following orders were created for panel order Extra Tubes.  Procedure                               Abnormality         Status                     ---------                               -----------         ------                     Gold Top - [facility][[id number]]                                   Final result                Light Blue Top[[id number]]                                   Final result                 Please view results for these tests on the individual orders.   [facility] TOP - [facility]   LIGHT BLUE TOP     XR Foot 3+ View Right    Result Date: 5/8/2023  Impression: Focal soft tissue swelling compatible with sequela of infection given patient history. No obvious subcutaneous emphysema or radiopaque graft foreign body identified Advanced degenerative changes centered at the midfoot compatible with Charcot deformity. No definite acute osseous abnormality noted. MRI is more sensitive for evaluation for osteomyelitis Electronically Signed: Yifan Rico  5/8/2023 10:19 AM EDT  Workstation ID: [id number]    MRI Foot Right With & Without Contrast    Result Date: 5/8/2023  Impression: Significant wound seen along the plantar aspect of the foot which appears to drain to the skin surface with draining sinus tract and extensive phlegmon and abscess formation which appears to surround the central bundle of the plantar fascia. This is confined to the soft tissues and there is no evidence of osseous involvement to suggest osteomyelitis. No suspicious tenosynovitis. Electronically Signed: Savannah William  5/8/2023 2:52 PM EDT  Workstation ID: [id number] [place]    Medications   sodium chloride 0.9 % flush 10 mL (has no administration in time range)   vancomycin IVPB 2000 mg in 0.9% Sodium Chloride (premix) 500 mL (has no administration in time range)   metroNIDAZOLE (FLAGYL) IVPB 500 mg (has no administration in time range)   cefTRIAXone (ROCEPHIN) 2 g in sodium chloride 0.9 % 100 mL IVPB (0 g Intravenous Stopped 5/8/23 1059)   gadoteridol (PROHANCE) injection 20 mL (20 mL Intravenous Given 5/8/23 1437)                                          Medical Decision Making  Patient presents today with complaints of a draining wound to the bottom of the right foot that he noticed after getting out of the shower today.  No fever.    Patient had  above exam evaluation.  IV was established.  Labs and x-ray were obtained.  He was given an dose of IV antibiotics in the ED.    Work-up: CBC is grossly unremarkable with a normal white blood cell count.  Metabolic panel significant for glucose 137, creatinine 1.35.  Sed rate 45.  CRP 2.63.  X-ray reviewed by me been interpreted by the radiologist shows focal soft tissue swelling compatible with sequela of infection given patient history.  No obvious subcutaneous emphysema or radiopaque foreign body.    Patient was discussed with Dr. Pineda.  Initially patient was felt to be stable for discharge home but he would like the patient have MRI prior to discharge.  This was obtained reviewed by me and interpreted by the radiologist and shows significant wound seen along the plantar aspect of the foot that appears to drain to the skin surface with a draining sinus tract and extensive phlegmon and abscess formation which appears to be surrounding the central bundle of the plantar fascia. This is  confined to the soft tissues and there is no evidence of osseous involvement to suggest osteomyelitis. No suspicious tenosynovitis.    Given MRI results and past history, opted to admit the patient to hospitalist service.  Patient was discussed with hospitalist nurse practitioner.    Diagnosis and treatment plan discussed with patient.  Patient agreeable to plan.       Abscess of right foot: complicated acute illness or injury  Charcot's joint of right foot: complicated acute illness or injury  History of diabetes mellitus: complicated acute illness or injury  Amount and/or Complexity of Data Reviewed  Labs: ordered.  Radiology: ordered.      Risk  Prescription drug management.  Decision regarding hospitalization.          Final diagnoses:   Abscess of right foot   Charcot's joint of right foot   History of diabetes mellitus       ED Disposition  ED Disposition     ED Disposition   Decision to Admit    Condition   --    Comment    Level of Care: Telemetry [5]   Diagnosis: Abscess of right foot [098437]   Admitting Physician: ALY SWANSON [[id number]]   Attending Physician: ALY SWANSON [[id number]]               No follow-up provider specified.       Medication List      No changes were made to your prescriptions during this visit.          Mary Morales, JACKELYN  05/08/23 1537

## 2023-05-08 NOTE — ED NOTES
Patient has wound on bottom of right foot for 2 years and has been managed by wound care.  Yesterday patient noted some discomfort behind knee and aching in ankle while at [employer].  This morning during shower noted some discharge from bottom of right foot and upon inspection noted some discoloration and swelling to the area.

## 2023-05-08 NOTE — Clinical Note
Level of Care: Med/Surg [1]   Admitting Physician: ALY SWANSON [[id number]]   Attending Physician: ALY SWANSON [[id number]]

## 2023-05-09 LAB
ANION GAP SERPL CALCULATED.3IONS-SCNC: 13 MMOL/L (ref 5–15)
BASOPHILS # BLD AUTO: 0.1 10*3/MM3 (ref 0–0.2)
BASOPHILS NFR BLD AUTO: 1.1 % (ref 0–1.5)
BUN SERPL-MCNC: 19 MG/DL (ref 8–23)
BUN/CREAT SERPL: 14.6 (ref 7–25)
CALCIUM SPEC-SCNC: 8.6 MG/DL (ref 8.6–10.5)
CHLORIDE SERPL-SCNC: 106 MMOL/L (ref 98–107)
CHOLEST SERPL-MCNC: 108 MG/DL (ref 0–200)
CO2 SERPL-SCNC: 21 MMOL/L (ref 22–29)
CREAT SERPL-MCNC: 1.3 MG/DL (ref 0.76–1.27)
DEPRECATED RDW RBC AUTO: 46.4 FL (ref 37–54)
EGFRCR SERPLBLD CKD-EPI 2021: 62.5 ML/MIN/1.73
EOSINOPHIL # BLD AUTO: 0.4 10*3/MM3 (ref 0–0.4)
EOSINOPHIL NFR BLD AUTO: 5.1 % (ref 0.3–6.2)
ERYTHROCYTE [DISTWIDTH] IN BLOOD BY AUTOMATED COUNT: 14.5 % (ref 12.3–15.4)
GLUCOSE BLDC GLUCOMTR-MCNC: 108 MG/DL (ref 70–105)
GLUCOSE BLDC GLUCOMTR-MCNC: 141 MG/DL (ref 70–105)
GLUCOSE BLDC GLUCOMTR-MCNC: 155 MG/DL (ref 70–105)
GLUCOSE BLDC GLUCOMTR-MCNC: 186 MG/DL (ref 70–105)
GLUCOSE SERPL-MCNC: 112 MG/DL (ref 65–99)
HBA1C MFR BLD: 7.1 % (ref 4.8–5.6)
HCT VFR BLD AUTO: 42.8 % (ref 37.5–51)
HDLC SERPL-MCNC: 38 MG/DL (ref 40–60)
HGB BLD-MCNC: 13.9 G/DL (ref 13–17.7)
LDLC SERPL CALC-MCNC: 52 MG/DL (ref 0–100)
LDLC/HDLC SERPL: 1.34 {RATIO}
LYMPHOCYTES # BLD AUTO: 1.2 10*3/MM3 (ref 0.7–3.1)
LYMPHOCYTES NFR BLD AUTO: 16.2 % (ref 19.6–45.3)
MCH RBC QN AUTO: 30.1 PG (ref 26.6–33)
MCHC RBC AUTO-ENTMCNC: 32.5 G/DL (ref 31.5–35.7)
MCV RBC AUTO: 92.5 FL (ref 79–97)
MONOCYTES # BLD AUTO: 0.6 10*3/MM3 (ref 0.1–0.9)
MONOCYTES NFR BLD AUTO: 8.8 % (ref 5–12)
MRSA DNA SPEC QL NAA+PROBE: NORMAL
NEUTROPHILS NFR BLD AUTO: 5 10*3/MM3 (ref 1.7–7)
NEUTROPHILS NFR BLD AUTO: 68.8 % (ref 42.7–76)
NRBC BLD AUTO-RTO: 0.1 /100 WBC (ref 0–0.2)
PLATELET # BLD AUTO: 187 10*3/MM3 (ref 140–450)
PMV BLD AUTO: 9.4 FL (ref 6–12)
POTASSIUM SERPL-SCNC: 4.4 MMOL/L (ref 3.5–5.2)
RBC # BLD AUTO: 4.62 10*6/MM3 (ref 4.14–5.8)
SODIUM SERPL-SCNC: 140 MMOL/L (ref 136–145)
TRIGL SERPL-MCNC: 96 MG/DL (ref 0–150)
TSH SERPL DL<=0.05 MIU/L-ACNC: 3.27 UIU/ML (ref 0.27–4.2)
VANCOMYCIN PEAK SERPL-MCNC: 20.3 MCG/ML (ref 20–40)
VANCOMYCIN SERPL-MCNC: 18.8 MCG/ML (ref 5–40)
VANCOMYCIN TROUGH SERPL-MCNC: 14.7 MCG/ML (ref 5–20)
VLDLC SERPL-MCNC: 18 MG/DL (ref 5–40)
WBC NRBC COR # BLD: 7.2 10*3/MM3 (ref 3.4–10.8)

## 2023-05-09 PROCEDURE — 99221 1ST HOSP IP/OBS SF/LOW 40: CPT | Performed by: PODIATRIST

## 2023-05-09 PROCEDURE — 80202 ASSAY OF VANCOMYCIN: CPT | Performed by: HOSPITALIST

## 2023-05-09 PROCEDURE — 87070 CULTURE OTHR SPECIMN AEROBIC: CPT | Performed by: NURSE PRACTITIONER

## 2023-05-09 PROCEDURE — 25010000002 ENOXAPARIN PER 10 MG: Performed by: HOSPITALIST

## 2023-05-09 PROCEDURE — 80048 BASIC METABOLIC PNL TOTAL CA: CPT | Performed by: NURSE PRACTITIONER

## 2023-05-09 PROCEDURE — 63710000001 INSULIN GLARGINE PER 5 UNITS: Performed by: HOSPITALIST

## 2023-05-09 PROCEDURE — 87205 SMEAR GRAM STAIN: CPT | Performed by: NURSE PRACTITIONER

## 2023-05-09 PROCEDURE — 87186 SC STD MICRODIL/AGAR DIL: CPT | Performed by: NURSE PRACTITIONER

## 2023-05-09 PROCEDURE — 36415 COLL VENOUS BLD VENIPUNCTURE: CPT | Performed by: NURSE PRACTITIONER

## 2023-05-09 PROCEDURE — 83036 HEMOGLOBIN GLYCOSYLATED A1C: CPT | Performed by: NURSE PRACTITIONER

## 2023-05-09 PROCEDURE — 84443 ASSAY THYROID STIM HORMONE: CPT | Performed by: NURSE PRACTITIONER

## 2023-05-09 PROCEDURE — 87641 MR-STAPH DNA AMP PROBE: CPT | Performed by: HOSPITALIST

## 2023-05-09 PROCEDURE — 96372 THER/PROPH/DIAG INJ SC/IM: CPT

## 2023-05-09 PROCEDURE — G0378 HOSPITAL OBSERVATION PER HR: HCPCS

## 2023-05-09 PROCEDURE — 63710000001 INSULIN LISPRO (HUMAN) PER 5 UNITS: Performed by: NURSE PRACTITIONER

## 2023-05-09 PROCEDURE — 85025 COMPLETE CBC W/AUTO DIFF WBC: CPT | Performed by: NURSE PRACTITIONER

## 2023-05-09 PROCEDURE — 80061 LIPID PANEL: CPT | Performed by: NURSE PRACTITIONER

## 2023-05-09 PROCEDURE — 25010000002 VANCOMYCIN 10 G RECONSTITUTED SOLUTION: Performed by: HOSPITALIST

## 2023-05-09 PROCEDURE — 96366 THER/PROPH/DIAG IV INF ADDON: CPT

## 2023-05-09 PROCEDURE — 87147 CULTURE TYPE IMMUNOLOGIC: CPT | Performed by: NURSE PRACTITIONER

## 2023-05-09 PROCEDURE — 82948 REAGENT STRIP/BLOOD GLUCOSE: CPT

## 2023-05-09 PROCEDURE — 25010000002 VANCOMYCIN 1 G RECONSTITUTED SOLUTION 1 EACH VIAL: Performed by: HOSPITALIST

## 2023-05-09 PROCEDURE — 25010000002 CEFTRIAXONE PER 250 MG: Performed by: HOSPITALIST

## 2023-05-09 RX ORDER — HYDROXYZINE HYDROCHLORIDE 25 MG/1
25 TABLET, FILM COATED ORAL 3 TIMES DAILY PRN
Status: DISCONTINUED | OUTPATIENT
Start: 2023-05-09 | End: 2023-05-10 | Stop reason: HOSPADM

## 2023-05-09 RX ADMIN — VANCOMYCIN HYDROCHLORIDE 1500 MG: 10 INJECTION, POWDER, LYOPHILIZED, FOR SOLUTION INTRAVENOUS at 06:16

## 2023-05-09 RX ADMIN — HYDROXYZINE HYDROCHLORIDE 25 MG: 25 TABLET, FILM COATED ORAL at 09:46

## 2023-05-09 RX ADMIN — METRONIDAZOLE 500 MG: 500 INJECTION, SOLUTION INTRAVENOUS at 02:36

## 2023-05-09 RX ADMIN — INSULIN LISPRO 2 UNITS: 100 INJECTION, SOLUTION INTRAVENOUS; SUBCUTANEOUS at 17:13

## 2023-05-09 RX ADMIN — ENOXAPARIN SODIUM 40 MG: 100 INJECTION SUBCUTANEOUS at 17:13

## 2023-05-09 RX ADMIN — VANCOMYCIN HYDROCHLORIDE 1000 MG: 1 INJECTION, POWDER, LYOPHILIZED, FOR SOLUTION INTRAVENOUS at 17:56

## 2023-05-09 RX ADMIN — HYDROXYZINE HYDROCHLORIDE 25 MG: 25 TABLET, FILM COATED ORAL at 17:23

## 2023-05-09 RX ADMIN — Medication 10 ML: at 08:29

## 2023-05-09 RX ADMIN — INSULIN GLARGINE 20 UNITS: 100 INJECTION, SOLUTION SUBCUTANEOUS at 08:29

## 2023-05-09 RX ADMIN — Medication 10 ML: at 22:00

## 2023-05-09 RX ADMIN — CEFTRIAXONE 2 G: 2 INJECTION, POWDER, FOR SOLUTION INTRAMUSCULAR; INTRAVENOUS at 08:29

## 2023-05-09 RX ADMIN — ASPIRIN 81 MG CHEWABLE TABLET 81 MG: 81 TABLET CHEWABLE at 08:29

## 2023-05-09 RX ADMIN — PANTOPRAZOLE SODIUM 40 MG: 40 TABLET, DELAYED RELEASE ORAL at 08:29

## 2023-05-09 RX ADMIN — PIOGLITAZONE 30 MG: 30 TABLET ORAL at 17:13

## 2023-05-09 RX ADMIN — ATORVASTATIN CALCIUM 40 MG: 40 TABLET, FILM COATED ORAL at 08:29

## 2023-05-09 RX ADMIN — METRONIDAZOLE 500 MG: 500 INJECTION, SOLUTION INTRAVENOUS at 17:13

## 2023-05-09 RX ADMIN — EMPAGLIFLOZIN 25 MG: 25 TABLET, FILM COATED ORAL at 08:29

## 2023-05-09 RX ADMIN — METRONIDAZOLE 500 MG: 500 INJECTION, SOLUTION INTRAVENOUS at 08:29

## 2023-05-09 NOTE — CONSULTS
05/09/23   Foot and Ankle Surgery - Consult  Provider: Dr. Robbi Pineda DPM  Location: [facility]    Subjective:  Agapito Canseco is a 62 y.o. male.     Chief Complaint   Patient presents with   • Foot Pain     Foot pain, pt states he has drainage and bruising at the bottom of right foot, painful.         Consulting Physician: Primary service    Reason for Consult: Right foot wound    HPI: Patient is a 62-year-old diabetic male with history of Charcot arthropathy involving the right foot who presented to the ED with progressive discomfort involving the right lower extremity and wound involving the plantar aspect of the foot.  He noticed some drainage after he got out of the shower yesterday morning and opted to report to the ED.  He has noticed progressive callusing involving the foot.  Patient has undergone surgery for plantar planing of the cuboid in the past.  He denies any recent issues involving his feet.  Denies any fever, chills, nausea, vomiting.    No Known Allergies    Past Medical History:   Diagnosis Date   • Cellulitis of right foot 11/25/2020   • Charcot foot due to diabetes mellitus 11/15/2019   • Diabetes mellitus     DM 2 and has charkoff's disease of right foot   • Diabetic retinopathy     mild   • Elevated cholesterol    • GERD (gastroesophageal reflux disease)    • Hypertension    • NAFLD (nonalcoholic fatty liver disease) 9/20/2017   • PONV (postoperative nausea and vomiting)     x 1   • Swelling of ankle, right 01/2021       Past Surgical History:   Procedure Laterality Date   • CARDIAC CATHETERIZATION      no stent   • FOOT OSTEOTOMY Right 2/5/2021    Procedure: PARTIAL CUBOID RESECTION;  Surgeon: MAX Pineda DPM;  Location: [facility];  Service: Podiatry;  Laterality: Right;   • FOOT SURGERY      left foot tendons and bones   • HERNIA REPAIR      umbilical   • INCISION AND DRAINAGE LEG Right 11/27/2020    Procedure: INCISION AND DRAINAGE LOWER EXTREMITY;  Surgeon: Miriam  MAX Culp DPM;  Location: [facility] MAIN OR;  Service: Podiatry;  Laterality: Right;   • WOUND DEBRIDEMENT Right 2/5/2021    Procedure: DEBRIDEMENT WOUND;  Surgeon: MAX Pineda DPM;  Location: [facility] MAIN OR;  Service: Podiatry;  Laterality: Right;       Family History   Problem Relation Age of Onset   • Heart disease Father        Social History     Socioeconomic History   • Marital status:    Tobacco Use   • Smoking status: Never   • Smokeless tobacco: Never   Vaping Use   • Vaping Use: Never used   Substance and Sexual Activity   • Alcohol use: No   • Drug use: No   • Sexual activity: Defer          Current Facility-Administered Medications:   •  acetaminophen (TYLENOL) tablet 650 mg, 650 mg, Oral, Q4H PRN, Sharon Jaffe APRN  •  amLODIPine (NORVASC) tablet 10 mg, 10 mg, Oral, Daily, Sharon Jaffe APRN  •  aspirin chewable tablet 81 mg, 81 mg, Oral, Daily, Sharon Jaffe APRN  •  atorvastatin (LIPITOR) tablet 40 mg, 40 mg, Oral, Daily, Sharon Jaffe APRN  •  cefTRIAXone (ROCEPHIN) 2 g in sodium chloride 0.9 % 100 mL IVPB, 2 g, Intravenous, Q24H, Emy Lerma MD  •  dextrose (D50W) (25 g/50 mL) IV injection 25 g, 25 g, Intravenous, Q15 Min PRN, Sharon Jaffe APRN  •  dextrose (GLUTOSE) oral gel 15 g, 15 g, Oral, Q15 Min PRN, Sharon Jaffe APRN  •  empagliflozin (JARDIANCE) tablet 25 mg, 25 mg, Oral, Daily, Sharon Jaffe APRN  •  Enoxaparin Sodium (LOVENOX) syringe 40 mg, 40 mg, Subcutaneous, Q24H, Emy Lerma MD, 40 mg at 05/08/23 7484  •  glucagon (GLUCAGEN) injection 1 mg, 1 mg, Intramuscular, Q15 Min PRN, Sharon Jaffe APRN  •  insulin glargine (LANTUS, SEMGLEE) injection 30 Units, 30 Units, Subcutaneous, Daily, Emy Lerma MD  •  insulin lispro (HUMALOG/ADMELOG) injection 2-9 Units, 2-9 Units, Subcutaneous, TID With Meals, Sharon Jaffe APRN  •  losartan (COZAAR) tablet 100 mg, 100 mg, Oral, Q PM, Sharno Jaffe, JACKELYN, 100 mg at  05/08/23 1912  •  metroNIDAZOLE (FLAGYL) IVPB 500 mg, 500 mg, Intravenous, Q8H, Emy Lerma MD, Stopped at 05/09/23 0310  •  nitroglycerin (NITROSTAT) SL tablet 0.4 mg, 0.4 mg, Sublingual, Q5 Min PRN, Sharon Jaffe APRN  •  ondansetron (ZOFRAN) injection 4 mg, 4 mg, Intravenous, Q6H PRN, Sharon Jaffe APRN  •  pantoprazole (PROTONIX) EC tablet 40 mg, 40 mg, Oral, Daily, Sharon Jaffe APRN  •  Pharmacy to Dose enoxaparin (LOVENOX), , Does not apply, Continuous PRN, Sharon Jaffe APRN  •  Pharmacy to dose vancomycin, , Does not apply, Continuous PRN, Emy Lerma MD  •  pioglitazone (ACTOS) tablet 30 mg, 30 mg, Oral, Q PM, Sharon Jaffe APRN, 30 mg at 05/08/23 2108  •  [COMPLETED] Insert Peripheral IV, , , Once **AND** sodium chloride 0.9 % flush 10 mL, 10 mL, Intravenous, PRN, Mary Morales APRN  •  sodium chloride 0.9 % flush 10 mL, 10 mL, Intravenous, Q12H, Sharon Jaffe APRN, 10 mL at 05/08/23 2108  •  sodium chloride 0.9 % flush 10 mL, 10 mL, Intravenous, PRN, Sharon Jaffe APRN  •  sodium chloride 0.9 % infusion 40 mL, 40 mL, Intravenous, PRN, Sharon Jaffe APRN  •  vancomycin 1500 mg/500 mL 0.9% NS IVPB ([facility]), 1,500 mg, Intravenous, Q12H, Emy Lerma MD, 1,500 mg at 05/09/23 0616  •  [START ON 5/11/2023] vitamin D (ERGOCALCIFEROL) capsule 50,000 Units, 50,000 Units, Oral, Once per day on Mon Thu, Sharon Jaffe APRN    Review of Systems:  General: Denies fever, chills, fatigue, and weakness.  Eyes: Denies vision loss, blurry vision, and excessive redness.  ENT: Denies hearing issues and difficulty swallowing.  Cardiovascular: Denies palpitations, chest pain, or syncopal episodes.  Respiratory: Denies shortness of breath, wheezing, and coughing.  GI: Denies abdominal pain, nausea, and vomiting.   : Denies frequency, hematuria, and urgency.  Musculoskeletal: Denies muscle cramps, joint pains, and stiffness.  Derm: + Right foot infection  Neuro:  "Denies headaches, numbness, loss of coordination, and tremors.  Psych: Denies anxiety and depression.  Endocrine: Denies temperature intolerance and changes in appetite.  Heme: Denies bleeding disorders or abnormal bruising.     Objective   BP 96/66 (BP Location: Right arm, Patient Position: Lying)   Pulse 67   Temp 98.1 °F (36.7 °C) (Oral)   Resp 14   Ht 185.4 cm (73\")   Wt 120 kg (265 lb)   SpO2 94%   BMI 34.96 kg/m²     General:   Appearance: appears stated age and healthy    Orientation: AAOx3    Affect: appropriate    Assistance: knee scooter       VASCULAR      Foot Vascularity   Normal vascular exam    Dorsalis pedis:  2+  Posterior tibial:  2+  Skin Temperature: warm    Edema Grading:  None  CFT:  < 3 seconds  Pedal Hair Growth:  Present  Varicosities: none        NEUROLOGIC      Foot Neurologic   Light touch sensation:  Diminished  Hot/Cold sensation: diminished    Protective Sensation using [place] Monofilament:  Diminished  Achilles reflex:  1+      MUSCULOSKELETAL       Foot Musculoskeletal   Ecchymosis:  None  Tenderness: none    Arch: Charcot foot deformity involving the right.  Rectus orientation involving the left foot      MUSCLE STRENGTH       Foot Muscle Strength   Normal strength    Foot dorsiflexion:  5  Foot plantar flexion:  5  Foot inversion:  5  Foot eversion:  5     Well-formed bulla formation involving the plantar aspect of the right midfoot.  After removal, full-thickness wound extending to the subcutaneous tissue without active drainage, tunneling, tracking, or further concern.  Wound measures approximately 3.5 x 1.5 cm          Results from last 7 days   Lab Units 05/08/23  1001   WBC 10*3/mm3 9.90   HEMOGLOBIN g/dL 13.4   HEMATOCRIT % 39.8   PLATELETS 10*3/mm3 185       Assessment & Plan     Abscess of right foot    Patient has presented to the hospital with concern of infection involving his right foot.  He noticed some drainage to the plantar wound and had some " discomfort involving the inside of his foot and leg.  Today on exam, he does have hyperkeratotic tissue with well-formed bulla formation involving the plantar lateral aspect of the midfoot.  The tissue was removed showing no tunneling or tracking.  He does have a full-thickness wound that extends to subcutaneous tissue.  Wound culture was obtained.  I do feel that we can proceed with IV antibiotics at this time but I do not feel that any operative intervention will be performed.  MRI does not show any obvious signs of osteomyelitis or deep space abscess.  I do feel that he should proceed with Betadine wet-to-dry dressing changes on a daily basis and a cam boot for offloading.  I do feel that oral antibiotics are appropriate for discharge.  Patient is to follow-up with me in office in 1 to 2 weeks for reevaluation.  Will sign off at this time.    Thank you for the consultation and allowing me to participate in this patient's care. Please call with any additional questions or concerns.     Note is dictated utilizing voice recognition software. Unfortunately this leads to occasional typographical errors. I apologize in advance if the situation occurs. If questions occur please do not hesitate to call our office.

## 2023-05-09 NOTE — CONSULTS
Infectious Diseases Consult Note    Referring Provider: Emy Lerma MD    Reason for Consultation: Right foot wound    Patient Care Team:  Tete Blank MD as PCP - General    Chief complaint chronic right foot wound that started draining    Subjective     The patient has been afebrile for the last 24 hours.  The patient is on room air, hemodynamically stable, and is tolerating antimicrobial therapy.    History of present illness:      This is a 62-year-old male who presents to the hospital on 5/8/2023 after a chronic right Charcot foot wound opened up and started some slight draining.  Patient denies fever, chills, diaphoresis, shortness of breath, cough, GI symptoms, or any urinary symptoms.    Review of Systems   Review of Systems   Constitutional: Negative.    HENT: Negative.    Eyes: Negative.    Respiratory: Negative.    Cardiovascular: Negative.    Gastrointestinal: Negative.    Endocrine: Negative.    Genitourinary: Negative.    Musculoskeletal: Negative.    Skin: Positive for wound.   Neurological: Negative.    Psychiatric/Behavioral: Negative.    All other systems reviewed and are negative.      Medications  Medications Prior to Admission   Medication Sig Dispense Refill Last Dose   • amLODIPine (NORVASC) 10 MG tablet Take 1 tablet by mouth Daily.      • aspirin 81 MG chewable tablet Chew 1 tablet Daily.      • Dulaglutide (Trulicity) 1.5 MG/0.5ML solution pen-injector Inject 1.5 mg under the skin into the appropriate area as directed 1 (One) Time Per Week. Mondays      • Empagliflozin (Jardiance) 25 MG tablet Take 1 tablet by mouth Daily. Do not take dos      • Insulin Glargine, 2 Unit Dial, (Toujeo Max SoloStar) 300 UNIT/ML solution pen-injector injection Inject 64 Units under the skin into the appropriate area as directed Daily.      • losartan (COZAAR) 100 MG tablet Take 1 tablet by mouth Every Evening.      • metFORMIN (GLUCOPHAGE) 1000 MG tablet Take 1 tablet by mouth 2 (Two) Times a Day  With Meals.      • pantoprazole (PROTONIX) 40 MG EC tablet Take 1 tablet by mouth Daily. Take dos      • pioglitazone (ACTOS) 30 MG tablet Take 1 tablet by mouth Every Evening. Do not take dos      • vitamin D (ERGOCALCIFEROL) 1.25 MG (22154 UT) capsule capsule Take 1 capsule by mouth 2 (Two) Times a Week. Sunday and Wednesdays      • atorvastatin (LIPITOR) 40 MG tablet Take 40 mg by mouth Daily.      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   • Diabetic retinopathy     mild   • Elevated cholesterol    • GERD (gastroesophageal reflux disease)    • Hypertension    • NAFLD (nonalcoholic fatty liver disease) 9/20/2017   • PONV (postoperative nausea and vomiting)     x 1   • Swelling of ankle, right 01/2021     Past Surgical History:   Procedure Laterality Date   • CARDIAC CATHETERIZATION      no stent   • FOOT OSTEOTOMY Right 2/5/2021    Procedure: PARTIAL CUBOID RESECTION;  Surgeon: MAX Pineda DPM;  Location: [facility] OR;  Service: Podiatry;  Laterality: Right;   • FOOT SURGERY      left foot tendons and bones   • HERNIA REPAIR      umbilical   • INCISION AND DRAINAGE LEG Right 11/27/2020    Procedure: INCISION AND DRAINAGE LOWER EXTREMITY;  Surgeon: MAX Pineda DPM;  Location: [facility] OR;  Service: Podiatry;  Laterality: Right;   • WOUND DEBRIDEMENT Right 2/5/2021    Procedure: DEBRIDEMENT WOUND;  Surgeon: MAX Pineda DPM;  Location: [facility] OR;  Service: Podiatry;  Laterality: Right;       Family History  Family History   Problem Relation Age of Onset   • Heart disease Father        Social History   reports that he has never smoked. He has never used smokeless tobacco. He reports that he does not drink alcohol and does not use  drugs.    Allergies  Patient has no known allergies.    Objective     Vital Signs   Vital Signs (last 24 hours)       05/08 0700  05/09 0659 05/09 0700  05/09 1326   Most Recent      Temp (°F) 97.6 -  98.1       98.1 (36.7) 05/09 0519    Heart Rate 63 -  93      76     76 05/09 0829    Resp 14 -  20      16     16 05/09 0829    BP 96/66 -  150/77      113/69     113/69 05/09 0829    SpO2 (%) 94 -  98      95     95 05/09 0829          Physical Exam:  Physical Exam  Vitals and nursing note reviewed.   Constitutional:       General: He is not in acute distress.     Appearance: Normal appearance. He is well-developed and normal weight. He is not diaphoretic.   HENT:      Head: Normocephalic and atraumatic.   Eyes:      Conjunctiva/sclera: Conjunctivae normal.      Pupils: Pupils are equal, round, and reactive to light.   Cardiovascular:      Rate and Rhythm: Normal rate and regular rhythm.      Heart sounds: Normal heart sounds, S1 normal and S2 normal.   Pulmonary:      Effort: Pulmonary effort is normal. No respiratory distress.      Breath sounds: Normal breath sounds. No stridor. No wheezing or rales.   Abdominal:      General: Bowel sounds are normal. There is no distension.      Palpations: Abdomen is soft. There is no mass.      Tenderness: There is no abdominal tenderness. There is no guarding.   Musculoskeletal:         General: No deformity. Normal range of motion.      Cervical back: Neck supple.   Skin:     General: Skin is warm and dry.      Coloration: Skin is not pale.      Findings: No erythema or rash.      Comments: Wound to the plantar aspect of the right foot-just had a bedside debridement with podiatry.  No significant periwound erythema or swelling    Charcot foot deformity   Neurological:      Mental Status: He is alert and oriented to person, place, and time.      Cranial Nerves: No cranial nerve deficit.   Psychiatric:         Mood and Affect: Mood normal.         Microbiology  Microbiology  Results (last 10 days)     Procedure Component Value - Date/Time    MRSA Screen, PCR (Inpatient) - Swab, Nares [[id number]]  (Normal) Collected: 05/09/23 1015    Lab Status: Final result Specimen: Swab from Nares Updated: 05/09/23 1158     MRSA PCR No MRSA Detected    Narrative:      The negative predictive value of this diagnostic test is high and should only be used to consider de-escalating anti-MRSA therapy. A positive result may indicate colonization with MRSA and must be correlated clinically.          Laboratory  Results from last 7 days   Lab Units 05/09/23  0630   WBC 10*3/mm3 7.20   HEMOGLOBIN g/dL 13.9   HEMATOCRIT % 42.8   PLATELETS 10*3/mm3 187     Results from last 7 days   Lab Units 05/09/23  0630   SODIUM mmol/L 140   POTASSIUM mmol/L 4.4   CHLORIDE mmol/L 106   CO2 mmol/L 21.0*   BUN mg/dL 19   CREATININE mg/dL 1.30*   GLUCOSE mg/dL 112*   CALCIUM mg/dL 8.6     Results from last 7 days   Lab Units 05/09/23  0630   SODIUM mmol/L 140   POTASSIUM mmol/L 4.4   CHLORIDE mmol/L 106   CO2 mmol/L 21.0*   BUN mg/dL 19   CREATININE mg/dL 1.30*   GLUCOSE mg/dL 112*   CALCIUM mg/dL 8.6         Results from last 7 days   Lab Units 05/08/23  1001   SED RATE mm/hr 45*           Radiology  Imaging Results (Last 72 Hours)     Procedure Component Value Units Date/Time    MRI Foot Right With & Without Contrast [[id number]] Collected: 05/08/23 1449     Updated: 05/08/23 1454    Narrative:      MRI FOOT RIGHT W WO CONTRAST    Date of Exam: 5/8/2023 2:00 PM EDT    Indication: draining wound/ro osteomyelitis or abscess.     Comparison: 5/8/2023    Technique:  Routine multiplanar/multisequence sequence images of the right foot were obtained before and after the uneventful administration 20 mL of Prohance.        Findings:  Redemonstration of findings related to Charcot neuropathy with significant degenerative changes present within the midfoot. Significant soft tissue swelling is seen along the plantar aspect of the foot  along the midfoot with probable draining wound and   draining sinus tract present (series 5 image 35). The area of edema measures up to 2.6 x 4.9 cm (series 5 image 35) by 5.5 (AP) centimeters. Diffuse enhancement is present within this region extending to the skin surface. No suspicious osseous edema or   enhancement identified. Mild edema and enhancement is seen within the adjacent central bundle of the plantar fascia (series 5 image 35). No evidence of tracking edema seen proximally along the plantar fascia. The phlegmonous changes appear to extend to   the border of the cuboid with no evidence of periostitis or suspicious osseous edema seen within the actual cuboid. No significant joint effusion identified. No suspicious tenosynovitis identified. Plantar musculature demonstrates increased T2 signal   changes related to denervation. 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      Impression:      Impression:  Significant wound seen along the plantar aspect of the foot which appears to drain to the skin surface with draining sinus tract and extensive phlegmon and abscess formation which appears to surround the central bundle of the plantar fascia. This is   confined to the soft tissues and there is no evidence of osseous involvement to suggest osteomyelitis. No suspicious tenosynovitis.        Electronically Signed: Savannah William    5/8/2023 2:52 PM EDT    Workstation ID: [id number]  Prohance    XR Foot 3+ View Right [[id number]] Collected: 05/08/23 1011     Updated: 05/08/23 1021    Narrative:      XR FOOT 3+ VW RIGHT    Date of Exam: 5/8/2023 9:30 AM EDT    Indication: draining wound to plantar surface of foot--h/o DM and charcot    Comparison: 11/25/2023 and prior    Findings:  Advanced degenerative changes at the midfoot compatible with given history of Charcot deformity. No definite acute fracture identified given the limitations of the exam. Overall findings are similar to  the previous study. Associated soft tissue swelling   is noted. The more focal soft tissue swelling noted along the plantar surface of the midfoot. No definite radiopaque foreign body or definite subcutaneous emphysema noted      Impression:      Impression:  Focal soft tissue swelling compatible with sequela of infection given patient history. No obvious subcutaneous emphysema or radiopaque graft foreign body identified    Advanced degenerative changes centered at the midfoot compatible with Charcot deformity. No definite acute osseous abnormality noted. MRI is more sensitive for evaluation for osteomyelitis      Electronically Signed: Yifan Rico    5/8/2023 10:19 AM EDT    Workstation ID: [id number]          Cardiology      Results Review:  I have reviewed all clinical data, test, lab, and imaging results.       Schedule Meds  aspirin, 81 mg, Oral, Daily  atorvastatin, 40 mg, Oral, Daily  cefTRIAXone, 2 g, Intravenous, Q24H  empagliflozin, 25 mg, Oral, Daily  enoxaparin, 40 mg, Subcutaneous, Q24H  insulin glargine, 20 Units, Subcutaneous, Daily  insulin lispro, 2-9 Units, Subcutaneous, TID With Meals  metroNIDAZOLE, 500 mg, Intravenous, Q8H  pantoprazole, 40 mg, Oral, Daily  pioglitazone, 30 mg, Oral, Q PM  sodium chloride, 10 mL, Intravenous, Q12H  vancomycin, 1,500 mg, Intravenous, Q12H  [START ON 5/11/2023] vitamin D, 50,000 Units, Oral, Once per day on Mon Thu        Infusion Meds  Pharmacy to Dose enoxaparin (LOVENOX),   Pharmacy to dose vancomycin,         PRN Meds  •  acetaminophen  •  dextrose  •  dextrose  •  glucagon (human recombinant)  •  hydrOXYzine  •  nitroglycerin  •  ondansetron  •  Pharmacy to Dose enoxaparin (LOVENOX)  •  Pharmacy to dose vancomycin  •  [COMPLETED] Insert Peripheral IV **AND** sodium chloride  •  sodium chloride  •  sodium chloride      Assessment & Plan       Assessment    Right Charcot foot wound deformity with superficial wound to the plantar aspect of the foot.  Patient  has had a bedside debridement by podiatry.  No significant periwound erythema.  Patient states that the wound had been closed and started draining the day before.  Wound culture was collected during the debridement  -MRI shows a possible abscess formation with no evidence of osteomyelitis  -Doppler was negative    Our service last saw the patient in December 2020 for methicillin susceptible Staphylococcus aureus bacteremia due to right foot wound.  Patient was treated with 6 weeks of IV Rocephin.    Type 2 diabetes-A1c is 1.7    CAZRAES cirrhosis    Plan    Continue IV vancomycin, IV ceftriaxone and Flagyl for now  Case discussed with podiatry and RN at bedside  Likely be able to discharge on oral antibiotics  Waiting on wound culture  Waiting on blood culture  Continue supportive care  A.mFernando Goodwin, APRN  05/09/23  13:26 EDT    Note is dictated utilizing voice recognition software/Dragon

## 2023-05-09 NOTE — NURSING NOTE
WOCN note:    62 yr old male admitted 5/8/23 with right foot abscess. WOCN consult received for right foot wound. Podiatry has also been consulted. We will defer to podiatry orders but will continue to follow as needed.

## 2023-05-09 NOTE — PROGRESS NOTES
"Pharmacy Antimicrobial Dosing Service    Subjective:  Agapito Canseco is a 62 y.o.male admitted with draining right foot wound. Pharmacy has been consulted to dose Vancomycin for possible SSTI.    PMH: DM2      Assessment/Plan    1. Day #2 Vancomycin: Goal -600 mcg*h/mL.   Loading dose: vancomycin 2 grams IV (~20.8 mg/kg DBW) given in the ED.   Maint dose: Vancomycin 1500 mg IV (~15.5 mg/kg DBW) q12h     5/9 @0630 vanc RL 20.3mcg/mL (drawn during infusion)    Re-timing vanc RL for today @1100 and keeping vanc TL today @1600.     2. Day #2 Ceftriaxone: 2 grams IV q24h    3. Day #2 Metronidazole: 500 mg q8h    Will continue to monitor drug levels, renal function, culture and sensitivities, and patient clinical status.       Objective:  Relevant clinical data and objective history reviewed:  185.4 cm (73\")   120 kg (265 lb)   Ideal body weight: 79.9 kg (176 lb 2.4 oz)  Adjusted ideal body weight: 96 kg (211 lb 11 oz)  Body mass index is 34.96 kg/m².    Results from last 7 days   Lab Units 05/09/23  0630   VANCOMYCIN PK mcg/mL 20.30     Results from last 7 days   Lab Units 05/09/23  0630 05/08/23  1001   CREATININE mg/dL 1.30* 1.35*     Estimated Creatinine Clearance: 80.9 mL/min (A) (by C-G formula based on SCr of 1.3 mg/dL (H)).  I/O last 3 completed shifts:  In: 200 [IV Piggyback:200]  Out: -     Results from last 7 days   Lab Units 05/09/23  0630 05/08/23  1001   WBC 10*3/mm3 7.20 9.90     Temperature    05/08/23 1958 05/08/23 2321 05/09/23 0519   Temp: 97.9 °F (36.6 °C) 97.6 °F (36.4 °C) 98.1 °F (36.7 °C)     Baseline culture/source/susceptibility:  Microbiology Results (last 10 days)       ** No results found for the last 240 hours. **            Anti-Infectives (From admission, onward)      Ordered     Dose/Rate Route Frequency Start Stop    05/08/23 7123  cefTRIAXone (ROCEPHIN) 2 g in sodium chloride 0.9 % 100 mL IVPB        Ordering Provider: Emy Lerma MD    2 g  200 mL/hr over 30 Minutes " Intravenous Every 24 Hours 05/09/23 0900 05/16/23 0859    05/08/23 1800  vancomycin 1500 mg/500 mL 0.9% NS IVPB ([facility])        Ordering Provider: Emy Lerma MD    1,500 mg Intravenous Every 12 Hours 05/09/23 0500 05/16/23 0459    05/08/23 1730  metroNIDAZOLE (FLAGYL) IVPB 500 mg        Ordering Provider: Emy Lerma MD    500 mg  over 30 Minutes Intravenous Every 8 Hours 05/09/23 0100 05/16/23 0059    05/08/23 1730  Pharmacy to dose vancomycin        Ordering Provider: Emy Lerma MD     Does not apply Continuous PRN 05/08/23 1730 05/15/23 1729    05/08/23 1512  vancomycin IVPB 2000 mg in 0.9% Sodium Chloride (premix) 500 mL        Ordering Provider: Mary Morales APRN    2,000 mg Intravenous Once 05/08/23 1514 05/08/23 2015    05/08/23 1512  metroNIDAZOLE (FLAGYL) IVPB 500 mg        Ordering Provider: Mary Morales APRN    500 mg  over 30 Minutes Intravenous Once 05/08/23 1514 05/08/23 1757    05/08/23 0920  cefTRIAXone (ROCEPHIN) 2 g in sodium chloride 0.9 % 100 mL IVPB        Ordering Provider: Mary Morales APRN    2 g  200 mL/hr over 30 Minutes Intravenous Once 05/08/23 0922 05/08/23 1059          Terese Nelson PharmD, BCPS  05/09/23 09:15 EDT

## 2023-05-09 NOTE — PLAN OF CARE
Goal Outcome Evaluation:  Plan of Care Reviewed With: patient        Progress: no change  Outcome Evaluation: Pt was admitted through the ER with an abcess of his right foot. Pt is on IV abx at this time. Pt to have consults to ID, and podiatry today, will continue to monitor at this time

## 2023-05-09 NOTE — PROGRESS NOTES
[facility] Medicine Services   Daily Progress Note    Patient Name: Agapito Canseco  : 1960  MRN: [id number]  Primary Care Physician:  Tete Blank MD  Date of admission: 2023  Date and Time of Service:       Subjective      Chief Complaint:     Patient complaining of rash on right arm. Possible hives  Not triggered by any certain medication.   No chest pain or SOB.   No chills or sweats.      Objective      Vitals:   Temp:  [97.6 °F (36.4 °C)-98.1 °F (36.7 °C)] 98.1 °F (36.7 °C)  Heart Rate:  [63-88] 67  Resp:  [14-18] 14  BP: ()/(62-80) 96/66    Physical Exam   General: No acute distress  HEENT: Neck supple, normal oral mucosa, no masses, no lymphadenopathy  Lungs: Clear bilaterally, no wheezing, no crackles, no rhonchi. Equal excursions.   CV - Normal S1/S2, no murmur, regular rate and rhythm   Abdomen - Soft, nontender, nondistended, normal bowel sounds  Extremities - no edema, no erythema  Neuro - No focal weakness, normal sensation  Psych - Alert and oriented x3  Skin - charcot deformity. Right foot plantar swelling and tenderness. Mild hive right arm.          Result Review    Result Review:  I have personally reviewed the results from the time of this admission to 2023 09:13 EDT and agree with these findings:  [x]  Laboratory  [x]  Microbiology  [x]  Radiology  [x]  EKG/Telemetry   [x]  Cardiology/Vascular   []  Pathology  [x]  Old records  []  Other:  Most notable findings include:     Wounds (last 24 hours)     LDA Wound     Row Name 23             [REMOVED] Wound 21 Right medial plantar Diabetic Ulcer    Wound - Properties Group Placement Date: 21  - Side: Right  -TH Orientation: medial  -TH Location: plantar  -TH Primary Wound Type: Diabetic ulc  -TH Additional Comments: adaptic soaked in betadine, 4x4, kerlix and ace  -TH Removal Date: 23  -MG Removal Time: 2033 Wound Outcome: Healed  -MG    Retired Wound - Properties Group  Placement Date: 02/05/21  -TH Side: Right  -TH Orientation: medial  -TH Location: plantar  -TH Primary Wound Type: Diabetic ulc  -TH Additional Comments: adaptic soaked in betadine, 4x4, kerlix and ace  -TH Removal Date: 05/08/23  -MG Removal Time: 2033 -MG Wound Outcome: Healed  -MG    Retired Wound - Properties Group Date first assessed: 02/05/21  -TH Side: Right  -TH Location: plantar  -TH Primary Wound Type: Diabetic ulc  -TH Additional Comments: adaptic soaked in betadine, 4x4, kerlix and ace  -TH Resolution Date: 05/08/23  -MG Resolution Time: 2033 -MG Wound Outcome: Healed  -MG       [REMOVED] Wound 11/25/20 2228 Right medial plantar Diabetic Ulcer    Wound - Properties Group Placement Date: 11/25/20  -PB Placement Time: 2228  -PB Side: Right  -PB Orientation: medial  -PB Location: plantar  -PB Primary Wound Type: Diabetic ulc  -PB Additional Comments: Has been going to wound clinic  -PB Removal Date: 05/08/23  -MG Removal Time: 2033 -MG Wound Outcome: Healed  -MG Stage, Pressure Injury : unstageable  -PB    Retired Wound - Properties Group Placement Date: 11/25/20  -PB Placement Time: 2228  -PB Side: Right  -PB Orientation: medial  -PB Location: plantar  -PB Primary Wound Type: Diabetic ulc  -PB Stage, Pressure Injury : unstageable  -PB Additional Comments: Has been going to wound clinic  -PB Removal Date: 05/08/23  -MG Removal Time: 2033 -MG Wound Outcome: Healed  -MG    Retired Wound - Properties Group Date first assessed: 11/25/20  -PB Time first assessed: 2228  -PB Side: Right  -PB Location: plantar  -PB Primary Wound Type: Diabetic ulc  -PB Additional Comments: Has been going to wound clinic  -PB Resolution Date: 05/08/23  -MG Resolution Time: 2033 -MG Wound Outcome: Healed  -MG       [REMOVED] Wound 11/27/20 1107 Right foot Incision    Wound - Properties Group Placement Date: 11/27/20  -PH Placement Time: 1107  -PH Present on Hospital Admission: N  -PH Side: Right  -PH Location: foot  -PH Primary  Wound Type: Incision  -PH Removal Date: 05/08/23  -MG Removal Time: 2034 -MG Wound Outcome: Healed  -MG    Retired Wound - Properties Group Placement Date: 11/27/20  -PH Placement Time: 1107  -PH Present on Hospital Admission: N  -PH Side: Right  -PH Location: foot  -PH Primary Wound Type: Incision  -PH Removal Date: 05/08/23  -MG Removal Time: 2034 -MG Wound Outcome: Healed  -MG    Retired Wound - Properties Group Date first assessed: 11/27/20  -PH Time first assessed: 1107  -PH Present on Hospital Admission: N  -PH Side: Right  -PH Location: foot  -PH Primary Wound Type: Incision  -PH Resolution Date: 05/08/23  -MG Resolution Time: 2034 -MG Wound Outcome: Healed  -MG       [REMOVED] Wound 02/05/21 Right lateral foot Incision    Wound - Properties Group Placement Date: 02/05/21  -TH Side: Right  -TH Orientation: lateral  -TH Location: foot  -TH Primary Wound Type: Incision  -TH Removal Date: 05/08/23  -MG Removal Time: 2034 -MG Wound Outcome: Healed  -MG    Retired Wound - Properties Group Placement Date: 02/05/21  -TH Side: Right  -TH Orientation: lateral  -TH Location: foot  -TH Primary Wound Type: Incision  -TH Removal Date: 05/08/23  -MG Removal Time: 2034 -MG Wound Outcome: Healed  -MG    Retired Wound - Properties Group Date first assessed: 02/05/21  -TH Side: Right  -TH Location: foot  -TH Primary Wound Type: Incision  -TH Resolution Date: 05/08/23  -MG Resolution Time: 2034 -MG Wound Outcome: Healed  -MG       Wound 05/08/23 2035 Right posterior plantar Abcess    Wound - Properties Group Placement Date: 05/08/23  -MG Placement Time: 2035  -MG Side: Right  -MG Orientation: posterior  -MG Location: plantar  -MG Primary Wound Type: Abcess  -MG    Wound Image Images linked: 1  -MG      Retired Wound - Properties Group Placement Date: 05/08/23  -MG Placement Time: 2035 -MG Side: Right  -MG Orientation: posterior  -MG Location: plantar  -MG Primary Wound Type: Abcess  -MG    Retired Wound - Properties  Group Date first assessed: 05/08/23  -MG Time first assessed: 2035  -MG Side: Right  -MG Location: plantar  -MG Primary Wound Type: Abcess  -MG          User Key  (r) = Recorded By, (t) = Taken By, (c) = Cosigned By    Initials Name Provider Type    Tete Murrell RN Registered Nurse     Jolene Grace RN Registered Nurse    Kim Small RN Registered Nurse    Payal Salamanca LPN Licensed Nurse    Payal Salamanca RN Registered Nurse                  Assessment & Plan      Brief Patient Summary:  Agapito Canseco is a 62 y.o. male who       aspirin, 81 mg, Oral, Daily  atorvastatin, 40 mg, Oral, Daily  cefTRIAXone, 2 g, Intravenous, Q24H  empagliflozin, 25 mg, Oral, Daily  enoxaparin, 40 mg, Subcutaneous, Q24H  insulin glargine, 20 Units, Subcutaneous, Daily  insulin lispro, 2-9 Units, Subcutaneous, TID With Meals  metroNIDAZOLE, 500 mg, Intravenous, Q8H  pantoprazole, 40 mg, Oral, Daily  pioglitazone, 30 mg, Oral, Q PM  sodium chloride, 10 mL, Intravenous, Q12H  vancomycin, 1,500 mg, Intravenous, Q12H  [START ON 5/11/2023] vitamin D, 50,000 Units, Oral, Once per day on Mon Thu       Pharmacy to Dose enoxaparin (LOVENOX),   Pharmacy to dose vancomycin,          Active Hospital Problems:  Active Hospital Problems    Diagnosis    • **Abscess of right foot      Plan:     Right foot wound with abscess  - Charcot foot  - Follow cultures  - ID and Podiatry on consult  - Continue Ceftriaxone, Flagyl, Vancomycin      Type 2 diabetes  - Lantus + SSI. Monitor and adjust. Avoid hypoglycemia     DL - Statin      GERD - PPI    Obesity - Body mass index is 34.96 kg/m².           DVT prophylaxis:  Medical DVT prophylaxis orders are present.    CODE STATUS:    Level Of Support Discussed With: Patient  Code Status (Patient has no pulse and is not breathing): CPR (Attempt to Resuscitate)  Medical Interventions (Patient has pulse or is breathing): Full Support      Disposition:  I expect patient to be  discharged     This patient has been  and discussed with . 05/09/23      Electronically signed by Emy Lerma MD, 05/09/23, 09:13 EDT.  [facility] Hospitalist Team

## 2023-05-10 VITALS
OXYGEN SATURATION: 96 % | BODY MASS INDEX: 34.48 KG/M2 | SYSTOLIC BLOOD PRESSURE: 136 MMHG | WEIGHT: 260.14 LBS | RESPIRATION RATE: 21 BRPM | HEART RATE: 80 BPM | TEMPERATURE: 97.8 F | HEIGHT: 73 IN | DIASTOLIC BLOOD PRESSURE: 74 MMHG

## 2023-05-10 LAB
ANION GAP SERPL CALCULATED.3IONS-SCNC: 13 MMOL/L (ref 5–15)
BASOPHILS # BLD AUTO: 0.1 10*3/MM3 (ref 0–0.2)
BASOPHILS NFR BLD AUTO: 0.9 % (ref 0–1.5)
BUN SERPL-MCNC: 19 MG/DL (ref 8–23)
BUN/CREAT SERPL: 15.6 (ref 7–25)
CALCIUM SPEC-SCNC: 8.9 MG/DL (ref 8.6–10.5)
CHLORIDE SERPL-SCNC: 105 MMOL/L (ref 98–107)
CO2 SERPL-SCNC: 21 MMOL/L (ref 22–29)
CREAT SERPL-MCNC: 1.22 MG/DL (ref 0.76–1.27)
DEPRECATED RDW RBC AUTO: 45.1 FL (ref 37–54)
EGFRCR SERPLBLD CKD-EPI 2021: 67.5 ML/MIN/1.73
EOSINOPHIL # BLD AUTO: 0.4 10*3/MM3 (ref 0–0.4)
EOSINOPHIL NFR BLD AUTO: 8.3 % (ref 0.3–6.2)
ERYTHROCYTE [DISTWIDTH] IN BLOOD BY AUTOMATED COUNT: 14.3 % (ref 12.3–15.4)
GLUCOSE BLDC GLUCOMTR-MCNC: 98 MG/DL (ref 70–105)
GLUCOSE SERPL-MCNC: 164 MG/DL (ref 65–99)
HCT VFR BLD AUTO: 39.7 % (ref 37.5–51)
HGB BLD-MCNC: 13 G/DL (ref 13–17.7)
LYMPHOCYTES # BLD AUTO: 0.9 10*3/MM3 (ref 0.7–3.1)
LYMPHOCYTES NFR BLD AUTO: 16.5 % (ref 19.6–45.3)
MCH RBC QN AUTO: 29.9 PG (ref 26.6–33)
MCHC RBC AUTO-ENTMCNC: 32.8 G/DL (ref 31.5–35.7)
MCV RBC AUTO: 91.4 FL (ref 79–97)
MONOCYTES # BLD AUTO: 0.5 10*3/MM3 (ref 0.1–0.9)
MONOCYTES NFR BLD AUTO: 9.7 % (ref 5–12)
NEUTROPHILS NFR BLD AUTO: 3.5 10*3/MM3 (ref 1.7–7)
NEUTROPHILS NFR BLD AUTO: 64.6 % (ref 42.7–76)
NRBC BLD AUTO-RTO: 0.1 /100 WBC (ref 0–0.2)
PLATELET # BLD AUTO: 179 10*3/MM3 (ref 140–450)
PMV BLD AUTO: 8.9 FL (ref 6–12)
POTASSIUM SERPL-SCNC: 3.8 MMOL/L (ref 3.5–5.2)
RBC # BLD AUTO: 4.34 10*6/MM3 (ref 4.14–5.8)
SODIUM SERPL-SCNC: 139 MMOL/L (ref 136–145)
WBC NRBC COR # BLD: 5.4 10*3/MM3 (ref 3.4–10.8)

## 2023-05-10 PROCEDURE — 25010000002 CEFTRIAXONE PER 250 MG: Performed by: HOSPITALIST

## 2023-05-10 PROCEDURE — G0378 HOSPITAL OBSERVATION PER HR: HCPCS

## 2023-05-10 PROCEDURE — 85025 COMPLETE CBC W/AUTO DIFF WBC: CPT | Performed by: HOSPITALIST

## 2023-05-10 PROCEDURE — 25010000002 VANCOMYCIN 1 G RECONSTITUTED SOLUTION 1 EACH VIAL: Performed by: HOSPITALIST

## 2023-05-10 PROCEDURE — 82948 REAGENT STRIP/BLOOD GLUCOSE: CPT

## 2023-05-10 PROCEDURE — 63710000001 INSULIN GLARGINE PER 5 UNITS: Performed by: HOSPITALIST

## 2023-05-10 PROCEDURE — 80048 BASIC METABOLIC PNL TOTAL CA: CPT | Performed by: HOSPITALIST

## 2023-05-10 RX ORDER — METRONIDAZOLE 500 MG/1
500 TABLET ORAL 3 TIMES DAILY
Qty: 42 TABLET | Refills: 0 | Status: SHIPPED | OUTPATIENT
Start: 2023-05-10 | End: 2023-05-10 | Stop reason: HOSPADM

## 2023-05-10 RX ORDER — DOXYCYCLINE HYCLATE 100 MG/1
100 CAPSULE ORAL 2 TIMES DAILY
Qty: 28 CAPSULE | Refills: 0 | Status: SHIPPED | OUTPATIENT
Start: 2023-05-10 | End: 2023-05-24

## 2023-05-10 RX ORDER — CEPHALEXIN 500 MG/1
500 CAPSULE ORAL 4 TIMES DAILY
Qty: 56 CAPSULE | Refills: 0 | Status: SHIPPED | OUTPATIENT
Start: 2023-05-10 | End: 2023-05-24

## 2023-05-10 RX ADMIN — INSULIN GLARGINE 20 UNITS: 100 INJECTION, SOLUTION SUBCUTANEOUS at 08:17

## 2023-05-10 RX ADMIN — CEFTRIAXONE 2 G: 2 INJECTION, POWDER, FOR SOLUTION INTRAMUSCULAR; INTRAVENOUS at 08:18

## 2023-05-10 RX ADMIN — EMPAGLIFLOZIN 25 MG: 25 TABLET, FILM COATED ORAL at 08:17

## 2023-05-10 RX ADMIN — HYDROXYZINE HYDROCHLORIDE 25 MG: 25 TABLET, FILM COATED ORAL at 06:33

## 2023-05-10 RX ADMIN — ATORVASTATIN CALCIUM 40 MG: 40 TABLET, FILM COATED ORAL at 08:18

## 2023-05-10 RX ADMIN — METRONIDAZOLE 500 MG: 500 INJECTION, SOLUTION INTRAVENOUS at 00:25

## 2023-05-10 RX ADMIN — VANCOMYCIN HYDROCHLORIDE 1000 MG: 1 INJECTION, POWDER, LYOPHILIZED, FOR SOLUTION INTRAVENOUS at 06:34

## 2023-05-10 RX ADMIN — ASPIRIN 81 MG CHEWABLE TABLET 81 MG: 81 TABLET CHEWABLE at 08:18

## 2023-05-10 RX ADMIN — METRONIDAZOLE 500 MG: 500 INJECTION, SOLUTION INTRAVENOUS at 08:53

## 2023-05-10 RX ADMIN — PANTOPRAZOLE SODIUM 40 MG: 40 TABLET, DELAYED RELEASE ORAL at 08:17

## 2023-05-10 RX ADMIN — Medication 10 ML: at 08:18

## 2023-05-10 NOTE — PLAN OF CARE
Goal Outcome Evaluation:  Plan of Care Reviewed With: patient        Progress: no change  Outcome Evaluation: Pt rested well through the night with no complaints. Pt is recevivng IV abx for an abcess of his right foot. ID and podiatry following. Plan for pt to D/c home on oral abx. Will continue to monitor at this time

## 2023-05-10 NOTE — DISCHARGE SUMMARY
[facility] Medicine Services  Discharge Summary    Date of Service: 05/10/23  Patient condition: Stable    Patient Name: Agapito Canseco  : 1960  MRN: [id number]    Date of Admission: 2023  Discharge Diagnosis: right diabetic foot wound with abscess  Date of Discharge:  05/10/23    Primary Care Physician: Tete Blank MD      Presenting Problem:   History of diabetes mellitus [Z86.39]  Abscess of right foot [L02.611]  Charcot's joint of right foot [M14.671]    Active and Resolved Hospital Problems:  Active Hospital Problems    Diagnosis POA   • **Abscess of right foot [L02.611] Yes      Resolved Hospital Problems   No resolved problems to display.         Hospital Course     Hospital Course:  Agapito Canseco is a 62 y.o. male Patient is a 62-year-old gentleman who presented with right foot wound and drainage had imaging that showed possible abscess with CharcoAid foot.  Was seen by podiatry infectious disease started on IV antibiotics.  Per podiatry MRI does not show any osteomyelitis or deep space abscess and proceeded with Betadine wet-to-dry dressing changes on a daily basis and cam boot for offloading and continue antibiotics per infectious disease recommendations.  ID recommended Keflex and doxycycline for 2 weeks and he will follow-up with podiatry in 1 to 2 weeks as an outpatient.  Patient is feeling better with no complaints and discharge was placed        DISCHARGE Follow Up Recommendations for labs and diagnostics:       Reasons For Change In Medications and Indications for New Medications:      Day of Discharge     Vital Signs:  Temp:  [97.6 °F (36.4 °C)-98.7 °F (37.1 °C)] 97.8 °F (36.6 °C)  Heart Rate:  [80-84] 80  Resp:  [15-24] 21  BP: (127-136)/(71-82) 136/74    Physical Exam:  Physical Exam   General: No acute distress  HEENT: Neck supple, normal oral mucosa, no masses, no lymphadenopathy  Lungs: Clear bilaterally, no wheezing, no crackles, no rhonchi. Equal  excursions.   CV - Normal S1/S2, no murmur, regular rate and rhythm   Abdomen - Soft, nontender, nondistended, normal bowel sounds  Extremities - no edema, no erythema  Neuro - No focal weakness, normal sensation  Psych - Alert and oriented x3  Skin - charcot deformity. Right foot in dressing         Pertinent  and/or Most Recent Results     LAB RESULTS:      Lab 05/10/23  0930 05/09/23  0630 05/08/23  1001   WBC 5.40 7.20 9.90   HEMOGLOBIN 13.0 13.9 13.4   HEMATOCRIT 39.7 42.8 39.8   PLATELETS 179 187 185   NEUTROS ABS 3.50 5.00 7.20*   LYMPHS ABS 0.90 1.20 1.20   MONOS ABS 0.50 0.60 1.20*   EOS ABS 0.40 0.40 0.30   MCV 91.4 92.5 89.8   SED RATE  --   --  45*   CRP  --   --  2.63*         Lab 05/10/23  0930 05/09/23  0630 05/08/23  1001   SODIUM 139 140 137   POTASSIUM 3.8 4.4 4.1   CHLORIDE 105 106 103   CO2 21.0* 21.0* 23.0   ANION GAP 13.0 13.0 11.0   BUN 19 19 18   CREATININE 1.22 1.30* 1.35*   EGFR 67.5 62.5 59.7*   GLUCOSE 164* 112* 137*   CALCIUM 8.9 8.6 9.4   HEMOGLOBIN A1C  --  7.10*  --    TSH  --  3.270  --          Lab 05/08/23  1001   TOTAL PROTEIN 7.1   ALBUMIN 4.2   GLOBULIN 2.9   ALT (SGPT) 22   AST (SGOT) 21   BILIRUBIN 0.8   ALK PHOS 62             Lab 05/09/23  0630   CHOLESTEROL 108   LDL CHOL 52   HDL CHOL 38*   TRIGLYCERIDES 96             Brief Urine Lab Results     None        Microbiology Results (last 10 days)     Procedure Component Value - Date/Time    Wound Culture - Wound, Foot, Right [[id number]]  (Abnormal) Collected: 05/09/23 1226    Lab Status: Preliminary result Specimen: Wound from Foot, Right Updated: 05/10/23 1108     Wound Culture Light growth (2+) Staphylococcus aureus     Gram Stain Rare (1+) Gram positive cocci in pairs      Many (4+) WBCs per low power field    MRSA Screen, PCR (Inpatient) - Swab, Nares [[id number]]  (Normal) Collected: 05/09/23 1015    Lab Status: Final result Specimen: Swab from Nares Updated: 05/09/23 1158     MRSA PCR No MRSA Detected    Narrative:       The negative predictive value of this diagnostic test is high and should only be used to consider de-escalating anti-MRSA therapy. A positive result may indicate colonization with MRSA and must be correlated clinically.    Blood Culture - Blood, Arm, Right [[id number]]  (Normal) Collected: 05/08/23 1630    Lab Status: Preliminary result Specimen: Blood from Arm, Right Updated: 05/09/23 1645     Blood Culture No growth at 24 hours    Narrative:      Less than seven (7) mL's of blood was collected.  Insufficient quantity may yield false negative results.    Blood Culture - Blood, Arm, Left [[id number]]  (Normal) Collected: 05/08/23 1628    Lab Status: Preliminary result Specimen: Blood from Arm, Left Updated: 05/09/23 1645     Blood Culture No growth at 24 hours    Narrative:      Less than seven (7) mL's of blood was collected.  Insufficient quantity may yield false negative results.          XR Foot 3+ View Right    Result Date: 5/8/2023  Impression: Impression: Focal soft tissue swelling compatible with sequela of infection given patient history. No obvious subcutaneous emphysema or radiopaque graft foreign body identified Advanced degenerative changes centered at the midfoot compatible with Charcot deformity. No definite acute osseous abnormality noted. MRI is more sensitive for evaluation for osteomyelitis Electronically Signed: Yifan Rico  5/8/2023 10:19 AM EDT  Workstation ID: [id number]    MRI Foot Right With & Without Contrast    Result Date: 5/8/2023  Impression: Impression: Significant wound seen along the plantar aspect of the foot which appears to drain to the skin surface with draining sinus tract and extensive phlegmon and abscess formation which appears to surround the central bundle of the plantar fascia. This is confined to the soft tissues and there is no evidence of osseous involvement to suggest osteomyelitis. No suspicious tenosynovitis. Electronically Signed: Savannah William  5/8/2023 2:52 PM EDT   Workstation ID: [id number] Prohance      Results for orders placed during the hospital encounter of 05/08/23    Duplex Venous Lower Extremity - Right CAR    Interpretation Summary  •  Normal right lower extremity venous duplex scan.      Results for orders placed during the hospital encounter of 05/08/23    Duplex Venous Lower Extremity - Right CAR    Interpretation Summary  •  Normal right lower extremity venous duplex scan.      Results for orders placed during the hospital encounter of 11/25/20    Adult Transesophageal Echo (AISHA) W/ Cont if Necessary Per Protocol    Interpretation Summary  · Left ventricular systolic function is normal.  · Left ventricular ejection fraction is 60 to 65%  · Left ventricular diastolic function was normal.  · Mild mitral valve regurgitation is present  · Saline test results are negative.  · No gross echodensity/vegetation noted on any of the valve      Labs Pending at Discharge:  Pending Labs     Order Current Status    Blood Culture - Blood, Arm, Left Preliminary result    Blood Culture - Blood, Arm, Right Preliminary result    Wound Culture - Wound, Foot, Right Preliminary result          Procedures Performed           Consults:   Consults     Date and Time Order Name Status Description    5/8/2023  4:41 PM Inpatient Infectious Diseases Consult Completed     5/8/2023 11:03 AM Inpatient Podiatry Consult Completed             Discharge Details        Discharge Medications      New Medications      Instructions Start Date   cephalexin 500 MG capsule  Commonly known as: Keflex   500 mg, Oral, 4 Times Daily      doxycycline 100 MG capsule  Commonly known as: VIBRAMYCIN   100 mg, Oral, 2 Times Daily         Continue These Medications      Instructions Start Date   amLODIPine 10 MG tablet  Commonly known as: NORVASC   10 mg, Oral, Daily      aspirin 81 MG chewable tablet   81 mg, Oral, Daily      atorvastatin 40 MG tablet  Commonly known as: LIPITOR   40 mg, Oral, Daily      FreeStyle  Shilo 14 Day Sensor misc   No dose, route, or frequency recorded.      Jardiance 25 MG tablet tablet  Generic drug: empagliflozin   25 mg, Oral, Daily, Do not take dos       losartan 100 MG tablet  Commonly known as: COZAAR   100 mg, Oral, Every Evening      metFORMIN 1000 MG tablet  Commonly known as: GLUCOPHAGE   1,000 mg, Oral, 2 Times Daily With Meals      pantoprazole 40 MG EC tablet  Commonly known as: PROTONIX   40 mg, Oral, Daily, Take dos      pioglitazone 30 MG tablet  Commonly known as: ACTOS   30 mg, Oral, Every Evening, Do not take dos      Toujeo Max SoloStar 300 UNIT/ML solution pen-injector injection  Generic drug: Insulin Glargine (2 Unit Dial)   64 Units, Subcutaneous, Daily      Trulicity 1.5 MG/0.5ML solution pen-injector  Generic drug: Dulaglutide   1.5 mg, Subcutaneous, Weekly, Mondays       vitamin D 1.25 MG (23096 UT) capsule capsule  Commonly known as: ERGOCALCIFEROL   50,000 Units, Oral, 2 Times Weekly, Sunday and Wednesdays              No Known Allergies      Discharge Disposition: home with OP follow up  Home or Self Care    Diet:  Hospital:No active diet order        Discharge Activity:         CODE STATUS:  Code Status and Medical Interventions:   Ordered at: 05/08/23 1645     Level Of Support Discussed With:    Patient     Code Status (Patient has no pulse and is not breathing):    CPR (Attempt to Resuscitate)     Medical Interventions (Patient has pulse or is breathing):    Full Support         No future appointments.        Time spent on Discharge including face to face service:  31 minutes    This patient has been  and discussed with . 05/10/23      Signature: Electronically signed by Emy Lerma MD, 05/10/23, 14:40 EDT.  [facility] Hospitalist Team

## 2023-05-11 ENCOUNTER — TELEPHONE (OUTPATIENT)
Dept: PODIATRY | Facility: CLINIC | Age: 63
End: 2023-05-11

## 2023-05-11 ENCOUNTER — READMISSION MANAGEMENT (OUTPATIENT)
Dept: CALL CENTER | Facility: HOSPITAL | Age: 63
End: 2023-05-11
Payer: COMMERCIAL

## 2023-05-11 LAB
BACTERIA SPEC AEROBE CULT: ABNORMAL
BACTERIA SPEC AEROBE CULT: ABNORMAL
GRAM STN SPEC: ABNORMAL
GRAM STN SPEC: ABNORMAL

## 2023-05-11 NOTE — TELEPHONE ENCOUNTER
Caller: Agapito Canseco    Relationship to patient: Self    Best call back number:     Chief complaint: RT FOOT     Type of visit:FOLLOW UP     Additional notes:PATIENT SAW DR MURPHY IN HOSPITAL AND NEEDS A 2 WEEK FOLLOW UP

## 2023-05-11 NOTE — OUTREACH NOTE
Prep Survey    Flowsheet Row Responses   [facility] facility patient discharged from? [facility]   Is LACE score < 7 ? No   Eligibility Readm Mgmt   Discharge diagnosis abscess of right foot   Does the patient have one of the following disease processes/diagnoses(primary or secondary)? Other   Does the patient have Home health ordered? No   Is there a DME ordered? No   Prep survey completed? Yes          Marianne PITTS - Registered Nurse

## 2023-05-13 LAB
BACTERIA SPEC AEROBE CULT: NORMAL
BACTERIA SPEC AEROBE CULT: NORMAL

## 2023-05-16 ENCOUNTER — READMISSION MANAGEMENT (OUTPATIENT)
Dept: CALL CENTER | Facility: HOSPITAL | Age: 63
End: 2023-05-16
Payer: COMMERCIAL

## 2023-05-16 NOTE — OUTREACH NOTE
Medical Week 1 Survey    Flowsheet Row Responses   [facility] patient discharged from? [facility]   Does the patient have one of the following disease processes/diagnoses(primary or secondary)? Other   Week 1 attempt successful? Yes   Call start time 1214   Call end time 1219   Meds reviewed with patient/caregiver? Yes   Is the patient having any side effects they believe may be caused by any medication additions or changes? No   Does the patient have all medications ordered at discharge? Yes   Is the patient taking all medications as directed (includes completed medication regime)? Yes   Does the patient have a primary care provider?  Yes   Does the patient have an appointment with their PCP within 7 days of discharge? Yes   Has the patient kept scheduled appointments due by today? N/A   Comments May 18th for pcp.   Has home health visited the patient within 72 hours of discharge? N/A   Psychosocial issues? No   Did the patient receive a copy of their discharge instructions? Yes   Nursing interventions Reviewed instructions with patient   What is the patient's perception of their health status since discharge? Improving   Is the patient/caregiver able to teach back signs and symptoms related to disease process for when to call PCP? Yes   Is the patient/caregiver able to teach back signs and symptoms related to disease process for when to call 911? Yes   Is the patient/caregiver able to teach back the hierarchy of who to call/visit for symptoms/problems? PCP, Specialist, Home health nurse, Urgent Care, ED, 911 Yes   If the patient is a current smoker, are they able to teach back resources for cessation? Not a smoker   Additional teach back comments Dressing changed daily by spouse. Says his foot is more than alf covered over.   Week 1 call completed? Yes   Is the patient interested in additional calls from an ambulatory ?  NOTE:  applies to high risk patients requiring additional follow-up. No    Wrap up additional comments No questions at this time.          Yecenia MARCUS - Registered Nurse

## 2023-05-24 ENCOUNTER — OFFICE VISIT (OUTPATIENT)
Dept: PODIATRY | Facility: CLINIC | Age: 63
End: 2023-05-24
Payer: COMMERCIAL

## 2023-05-24 VITALS — RESPIRATION RATE: 20 BRPM | BODY MASS INDEX: 34.46 KG/M2 | HEIGHT: 73 IN | WEIGHT: 260 LBS

## 2023-05-24 DIAGNOSIS — L97.512 CHRONIC ULCER OF RIGHT FOOT WITH FAT LAYER EXPOSED: Primary | ICD-10-CM

## 2023-05-24 DIAGNOSIS — M14.671 CHARCOT'S JOINT OF RIGHT FOOT: ICD-10-CM

## 2023-05-24 DIAGNOSIS — E11.42 DM TYPE 2 WITH DIABETIC PERIPHERAL NEUROPATHY: ICD-10-CM

## 2023-05-24 NOTE — PROGRESS NOTES
2023  Foot and Ankle Surgery - Established Patient/Follow-up  Provider: Dr. Robbi Pineda DPM  Location: [facility]    Subjective:  Agapito Canseco is a 62 y.o. male.     Chief Complaint   Patient presents with   • Right Foot - Post-op     I/d 2023    • Post-op     FREDO Blank md 2023       HPI: The patient is a 62-year-old male who presents to the clinic for a follow-up regarding his right foot after recent hospitalization.  He states that he is doing much better.  Patient was admitted for concerns of infection and recurrent wound involving the plantar lateral aspect of his right foot secondary to Charcot foot deformity.  Patient underwent cuboid planing greater than 2 years ago and states that he did very well.  He did recently increase his activity with pair of shoes that caused progressive symptoms.  Today, he has noticed significant improvement to the wound and has not noticed any residual infections.  He was treated with oral antibiotics at discharge.  No other issues or concerns today.    No Known Allergies    Current Outpatient Medications on File Prior to Visit   Medication Sig Dispense Refill   • amLODIPine (NORVASC) 10 MG tablet Take 1 tablet by mouth Daily.     • aspirin 81 MG chewable tablet Chew 1 tablet Daily.     • atorvastatin (LIPITOR) 40 MG tablet Take 1 tablet by mouth Daily.     • [] cephalexin (Keflex) 500 MG capsule Take 1 capsule by mouth 4 (Four) Times a Day for 14 days. 56 capsule 0   • Continuous Blood Gluc Sensor (FreeStyle Shilo 14 Day Sensor) misc      • [] doxycycline (VIBRAMYCIN) 100 MG capsule Take 1 capsule by mouth 2 (Two) Times a Day for 14 days. 28 capsule 0   • Dulaglutide (Trulicity) 1.5 MG/0.5ML solution pen-injector Inject 1.5 mg under the skin into the appropriate area as directed 1 (One) Time Per Week.      • Empagliflozin (Jardiance) 25 MG tablet Take 1 tablet by mouth Daily. Do not take dos     • Insulin Glargine, 2  "Unit Dial, (Toujeo Max SoloStar) 300 UNIT/ML solution pen-injector injection Inject 64 Units under the skin into the appropriate area as directed Daily.     • losartan (COZAAR) 100 MG tablet Take 1 tablet by mouth Every Evening.     • metFORMIN (GLUCOPHAGE) 1000 MG tablet Take 1 tablet by mouth 2 (Two) Times a Day With Meals.     • pantoprazole (PROTONIX) 40 MG EC tablet Take 1 tablet by mouth Daily. Take dos     • pioglitazone (ACTOS) 30 MG tablet Take 1 tablet by mouth Every Evening. Do not take dos     • vitamin D (ERGOCALCIFEROL) 1.25 MG (20748 UT) capsule capsule Take 1 capsule by mouth 2 (Two) Times a Week. Sunday and Wednesdays       No current facility-administered medications on file prior to visit.       Objective   Resp 20   Ht 185.4 cm (73\")   Wt 118 kg (260 lb)   BMI 34.30 kg/m²     Foot/Ankle Exam  Physical Exam   General: No acute distress  HEENT: Neck supple, normal oral mucosa, no masses, no lymphadenopathy  Lungs: Clear bilaterally, no wheezing, no crackles, no rhonchi. Equal excursions.   CV - Normal S1/S2, no murmur, regular rate and rhythm   Abdomen - Soft, nontender, nondistended, normal bowel sounds  Extremities - no edema, no erythema  Neuro - No focal weakness, normal sensation  Psych - Alert and oriented x3  Skin - charcot deformity. Right foot in dressing      05/24/2023: Change dermatologic to wound is substantially smaller and nearly healed. Mild periwound hyperkeratotic tissue. No tunneling, tracking or fluctuance. Continued Charcot foot deformity without any obvious progressive changes.    Assessment & Plan   Diagnoses and all orders for this visit:    1. Chronic ulcer of right foot with fat layer exposed (Primary)    2. Charcot's joint of right foot    3. DM type 2 with diabetic peripheral neuropathy      Patient appears to be doing quite well at this time.  He denies any significant pain or limitation.  The wound has improved since discharge from the hospital.  No residual signs " of infection are noted.  The wound is quite small at this time and I do feel that he should proceed with Betadine applications and offloading.  Patient has been using a regular shoe as he started to noticed a blister from the cam boot.  I do feel this is appropriate but I would like him to continue using the knee scooter for off weightbearing activities.  Patient understands that I will see him in 2 weeks for reevaluation for further planning and weightbearing x-rays for the right foot.  Greater than 20 minutes spent before, during, and after evaluation for patient care.    No orders of the defined types were placed in this encounter.         Note is dictated utilizing voice recognition software. Unfortunately this leads to occasional typographical errors. I apologize in advance if the situation occurs. If questions occur please do not hesitate to call our office.    Transcribed from ambient dictation for MAX Pineda DPM by Danielle Hyman.  05/24/23   13:25 EDT    Patient or patient representative verbalized consent to the visit recording.  I have personally performed the services described in this document as transcribed by the above individual, and it is both accurate and complete.

## 2023-05-25 ENCOUNTER — TRANSCRIBE ORDERS (OUTPATIENT)
Dept: ADMINISTRATIVE | Facility: HOSPITAL | Age: 63
End: 2023-05-25

## 2023-05-25 DIAGNOSIS — N17.9 ACUTE RENAL FAILURE, UNSPECIFIED ACUTE RENAL FAILURE TYPE: Primary | ICD-10-CM

## 2023-05-31 ENCOUNTER — HOSPITAL ENCOUNTER (OUTPATIENT)
Dept: ULTRASOUND IMAGING | Facility: HOSPITAL | Age: 63
Discharge: HOME | End: 2023-05-31
Admitting: FAMILY MEDICINE

## 2023-05-31 DIAGNOSIS — N17.9 ACUTE RENAL FAILURE, UNSPECIFIED ACUTE RENAL FAILURE TYPE: ICD-10-CM

## 2023-05-31 PROCEDURE — 76775 US EXAM ABDO BACK WALL LIM: CPT

## 2023-06-13 ENCOUNTER — OFFICE VISIT (OUTPATIENT)
Dept: PODIATRY | Facility: CLINIC | Age: 63
End: 2023-06-13
Payer: COMMERCIAL

## 2023-06-13 VITALS — RESPIRATION RATE: 20 BRPM | BODY MASS INDEX: 34.46 KG/M2 | WEIGHT: 260 LBS | HEIGHT: 73 IN

## 2023-06-13 DIAGNOSIS — M14.671 CHARCOT'S JOINT OF RIGHT FOOT: ICD-10-CM

## 2023-06-13 DIAGNOSIS — E11.42 DM TYPE 2 WITH DIABETIC PERIPHERAL NEUROPATHY: ICD-10-CM

## 2023-06-13 DIAGNOSIS — L97.512 CHRONIC ULCER OF RIGHT FOOT WITH FAT LAYER EXPOSED: Primary | ICD-10-CM

## 2023-06-13 NOTE — PROGRESS NOTES
06/13/2023  Foot and Ankle Surgery - Established Patient/Follow-up  Provider: Dr. Robbi Pindea DPM  Location: [facility]    Subjective:  Agapito Canseco is a 62 y.o. male.     Chief Complaint   Patient presents with    Right Foot - Follow-up, Foot Ulcer, Wound Check, Charcot-Lacey-Tooth Disease    Follow-up     FREDO Blank md 5/9/2022       HPI: The patient presents today for a follow-up regarding injury to his right foot.    The patient reports that he took a bandage off and it pulled a spot off of his skin which created a small open wound.  Otherwise, he is doing quite well.  He denies any other issues or concerns.    No Known Allergies    Current Outpatient Medications on File Prior to Visit   Medication Sig Dispense Refill    amLODIPine (NORVASC) 10 MG tablet Take 1 tablet by mouth Daily.      aspirin 81 MG chewable tablet Chew 1 tablet Daily.      atorvastatin (LIPITOR) 40 MG tablet Take 1 tablet by mouth Daily.      Continuous Blood Gluc Sensor (VeracodeStyle Shilo 14 Day Sensor) misc       Dulaglutide (Trulicity) 1.5 MG/0.5ML solution pen-injector Inject 1.5 mg under the skin into the appropriate area as directed 1 (One) Time Per Week. Mondays      Empagliflozin (Jardiance) 25 MG tablet Take 1 tablet by mouth Daily. Do not take dos      Insulin Glargine, 2 Unit Dial, (Toujeo Max SoloStar) 300 UNIT/ML solution pen-injector injection Inject 64 Units under the skin into the appropriate area as directed Daily.      losartan (COZAAR) 100 MG tablet Take 1 tablet by mouth Every Evening.      metFORMIN (GLUCOPHAGE) 1000 MG tablet Take 1 tablet by mouth 2 (Two) Times a Day With Meals.      pantoprazole (PROTONIX) 40 MG EC tablet Take 1 tablet by mouth Daily. Take dos      pioglitazone (ACTOS) 30 MG tablet Take 1 tablet by mouth Every Evening. Do not take dos      vitamin D (ERGOCALCIFEROL) 1.25 MG (76688 UT) capsule capsule Take 1 capsule by mouth 2 (Two) Times a Week. Sunday and Wednesdays       No current  "facility-administered medications on file prior to visit.       Objective   Resp 20   Ht 185.4 cm (73\")   Wt 118 kg (260 lb)   BMI 34.30 kg/m²     Foot/Ankle Exam    Foot/Ankle Exam  Physical Exam   General: No acute distress  HEENT: Neck supple, normal oral mucosa, no masses, no lymphadenopathy  Lungs: Clear bilaterally, no wheezing, no crackles, no rhonchi. Equal excursions.   CV - Normal S1/S2, no murmur, regular rate and rhythm   Abdomen - Soft, nontender, nondistended, normal bowel sounds  Extremities - no edema, no erythema  Neuro - No focal weakness, normal sensation  Psych - Alert and oriented x3  Skin - charcot deformity. Right foot in dressing      05/24/2023: Change dermatologic to wound is substantially smaller and nearly healed. Mild periwound hyperkeratotic tissue. No tunneling, tracking or fluctuance. Continued Charcot foot deformity without any obvious progressive changes.     06/13/23 Wound to the plantar aspect of the right foot is much improved with stable eschar formation. No periwound erythema, edema, or signs of infection. Continued equinus contracture with knee extended and flexed.     Assessment & Plan   Diagnoses and all orders for this visit:    1. Chronic ulcer of right foot with fat layer exposed (Primary)    2. Charcot's joint of right foot    3. DM type 2 with diabetic peripheral neuropathy        The patient presented for a follow up injury to his right foot. No signs of infection involving the plantar aspect of the right foot. Advised the patient that I would like for him to moisturize this with Aquaphor twice a day. Recommend that he continue to use his knee scooter until this is very stable. Informed the patient that this can take another 2 to 4 weeks. Advised the patient that if we have anything that looks like a recurrent callus or more of a problem then we need to discuss more from an operative perspective. If the skin on the area starts to look normal he can get back into a " tennis shoes, but no aggressive activity. He will return in 4 weeks. Greater than 20 minutes spent before coming during, coming after evaluation for patient care.      No orders of the defined types were placed in this encounter.         Note is dictated utilizing voice recognition software. Unfortunately this leads to occasional typographical errors. I apologize in advance if the situation occurs. If questions occur please do not hesitate to call our office.    Transcribed from ambient dictation for MAX Pineda DPM by Nancy Stephens.  06/13/23   13:11 EDT    Patient or patient representative verbalized consent to the visit recording.  I have personally performed the services described in this document as transcribed by the above individual, and it is both accurate and complete.

## 2023-07-19 NOTE — H&P (VIEW-ONLY)
"07/19/2023  Foot and Ankle Surgery - Established Patient/Follow-up  Provider: Dr. Robbi Pineda DPM  Location: [facility]    Subjective:  Agapito Canseco is a 62 y.o. male.     Chief Complaint   Patient presents with    Right Foot - Wound Check    Follow-up     LU Blank MD 05/2023       HPI:  Agapito Canseco is a 62-year-old male who presents to the office today for a follow-up regarding his right foot. He is accompanied by an adult female today.    The patient was last seen approximately 1 month ago. He reports continued issues with his right foot. He states he has been ambulating. He notes he tried to stay off of his right foot completely after his last visit to see what kind of difference it made. He noticed the calluses diminishing. He has been applying Aquaphor every morning. He states as the wound was getting smaller it started \"tunneling\". He notes the adult female trimmed the callus and it fell off.    The patient states since he has had the Charcot situation, he has developed a natural instinct of bearing weight on his left foot. He notes when he is standing and cannot put weight on his left foot, he notices it. He states for a very long time, he did not even notice that he had a callus on the plantar aspect of his foot. He notes it was normal; however, he started getting the callus build up. He states he did not think anything about it until it was too late.    The patient states Dr. Selby informed him that he was not a candidate for surgery.      No Known Allergies    Current Outpatient Medications on File Prior to Visit   Medication Sig Dispense Refill    amLODIPine (NORVASC) 10 MG tablet Take 1 tablet by mouth Daily.      aspirin 81 MG chewable tablet Chew 1 tablet Daily.      atorvastatin (LIPITOR) 40 MG tablet Take 1 tablet by mouth Daily.      Continuous Blood Gluc Sensor (FreeStyle Shilo 14 Day Sensor) misc       Dulaglutide (Trulicity) 1.5 MG/0.5ML solution pen-injector Inject 1.5 mg " "under the skin into the appropriate area as directed 1 (One) Time Per Week. Mondays      Empagliflozin (Jardiance) 25 MG tablet Take 1 tablet by mouth Daily. Do not take dos      Insulin Glargine, 2 Unit Dial, (Toujeo Max SoloStar) 300 UNIT/ML solution pen-injector injection Inject 64 Units under the skin into the appropriate area as directed Daily.      losartan (COZAAR) 100 MG tablet Take 1 tablet by mouth Every Evening.      metFORMIN (GLUCOPHAGE) 1000 MG tablet Take 1 tablet by mouth 2 (Two) Times a Day With Meals.      pantoprazole (PROTONIX) 40 MG EC tablet Take 1 tablet by mouth Daily. Take dos      pioglitazone (ACTOS) 30 MG tablet Take 1 tablet by mouth Every Evening. Do not take dos      vitamin D (ERGOCALCIFEROL) 1.25 MG (74222 UT) capsule capsule Take 1 capsule by mouth 2 (Two) Times a Week. Sunday and Wednesdays       No current facility-administered medications on file prior to visit.       Objective   Pulse 77   Ht 185.4 cm (73\")   Wt 118 kg (260 lb)   SpO2 96%   BMI 34.30 kg/mý     Foot/Ankle Exam  General: No acute distress  HEENT: Neck supple, normal oral mucosa, no masses, no lymphadenopathy  Lungs: Clear bilaterally, no wheezing, no crackles, no rhonchi. Equal excursions.   CV - Normal S1/S2, no murmur, regular rate and rhythm   Abdomen - Soft, nontender, nondistended, normal bowel sounds  Extremities - no edema, no erythema  Neuro - No focal weakness, normal sensation  Psych - Alert and oriented x3  Skin - charcot deformity. Right foot in dressing      05/24/2023: Change dermatologic to wound is substantially smaller and nearly healed. Mild periwound hyperkeratotic tissue. No tunneling, tracking or fluctuance. Continued Charcot foot deformity without any obvious progressive changes.     06/13/23 Wound to the plantar aspect of the right foot is much improved with stable eschar formation. No periwound erythema, edema, or signs of infection. Continued equinus contracture with knee extended and " flexed.     07/19/2023  Continued open wound involving the plantar aspect of the foot. Wound is granular and measures approximately 1 cm in diameter. No signs of infection. Continued moderate equinus contracture.    Assessment & Plan   Diagnoses and all orders for this visit:    1. Chronic ulcer of right foot with fat layer exposed (Primary)  -     XR Foot 3+ View Right  -     XR Chest 2 View; Future  -     ECG 12 Lead; Future  -     Case Request; Standing  -     CBC (No Diff); Future  -     Basic Metabolic Panel; Future  -     ceFAZolin (ANCEF) 2,000 mg in sodium chloride 0.9 % 100 mL IVPB    2. Charcot's joint of right foot  -     XR Chest 2 View; Future  -     ECG 12 Lead; Future  -     Case Request; Standing  -     CBC (No Diff); Future  -     Basic Metabolic Panel; Future  -     ceFAZolin (ANCEF) 2,000 mg in sodium chloride 0.9 % 100 mL IVPB    3. DM type 2 with diabetic peripheral neuropathy    4. Acquired posterior equinus, right  -     XR Chest 2 View; Future  -     ECG 12 Lead; Future  -     Case Request; Standing  -     CBC (No Diff); Future  -     Basic Metabolic Panel; Future  -     ceFAZolin (ANCEF) 2,000 mg in sodium chloride 0.9 % 100 mL IVPB    Other orders  -     Follow Anesthesia Guidelines / Protocol; Future  -     Follow Anesthesia Guidelines / Protocol; Standing  -     Verify / Perform Chlorhexidine Skin Prep; Standing  -     Verify / Perform Chlorhexidine Skin Prep if Indicated (If Not Already Completed); Standing  -     Obtain Informed Consent; Future  -     Provide NPO Instructions to Patient; Future  -     Chlorhexidine Skin Prep; Future    Patient returns with continued wound involving the plantar aspect of the right foot.  Wound has improved and is stable at this time but does remain.  We did discuss this issue at length.  Patient states that he is getting relatively impatient with the wound.  Unfortunately, I do feel that even if the wound does heal I am concerned about recurrence.  I  have explained this with him at length.  We did discuss alternative options including surgery.  I did discuss gastrocnemius recession with cuboid planing.  I reviewed the procedures, risk, goals, and recovery with him at length.  He does understand that he would require strict nonweightbearing activity after surgery but hopefully a truncated recovery overall.  We did briefly discuss major reconstruction which I do not advise at this time.  Patient understands and agrees and feels that this is the best option to proceed with at this time.  We did review risks of wound healing complications, infection, and need for additional surgeries that could require major amputation.  We will plan for the near future.  All questions were answered to patient's satisfaction.  During the interim, I would like him to continue using the knee scooter and applying dressings as instructed.    Orders Placed This Encounter   Procedures    XR Foot 3+ View Right     Order Specific Question:   Reason for Exam:     Answer:   rt plantar wound  r/o osteomyelitis   room 14  wb may leave after     Order Specific Question:   Does this patient have a diabetic monitoring/medication delivering device on?     Answer:   No     Order Specific Question:   Release to patient     Answer:   Routine Release    XR Chest 2 View     Standing Status:   Future     Standing Expiration Date:   7/20/2024     Order Specific Question:   Reason for Exam:     Answer:   Preop     Order Specific Question:   Release to patient     Answer:   Routine Release    CBC (No Diff)     Standing Status:   Future     Standing Expiration Date:   7/20/2024     Order Specific Question:   Release to patient     Answer:   Routine Release    Basic Metabolic Panel     Standing Status:   Future     Standing Expiration Date:   7/20/2024     Order Specific Question:   Release to patient     Answer:   Routine Release    Obtain Informed Consent     Standing Status:   Future     Order Specific  Question:   Informed Consent Given For     Answer:   Gastrocnemius recession and cuboid planing to the right lower extremity    Provide NPO Instructions to Patient     Standing Status:   Future    Chlorhexidine Skin Prep     Chlorhexidine Skin Prep and Instructions For All Patients Having A Procedure Requiring an Outward Incision if Not Allergic. If Allergic, Give Antibacterial Skin Wipes and Instructions. Do Not Use For Facial Cases or on Any Mucus Membranes.     Standing Status:   Future    ECG 12 Lead     Standing Status:   Future     Standing Expiration Date:   7/20/2024     Order Specific Question:   Reason for Exam:     Answer:   Preop     Order Specific Question:   Release to patient     Answer:   Routine Release          Note is dictated utilizing voice recognition software. Unfortunately this leads to occasional typographical errors. I apologize in advance if the situation occurs. If questions occur please do not hesitate to call our office.    Transcribed from ambient dictation for MAX Pineda DPM by Garth Vila.  07/19/23   09:36 EDT    Patient or patient representative verbalized consent to the visit recording.  I have personally performed the services described in this document as transcribed by the above individual, and it is both accurate and complete.

## 2023-07-21 PROBLEM — M21.861 ACQUIRED POSTERIOR EQUINUS, RIGHT: Status: ACTIVE | Noted: 2023-07-21

## 2023-08-02 ENCOUNTER — HOSPITAL ENCOUNTER (OUTPATIENT)
Dept: GENERAL RADIOLOGY | Facility: HOSPITAL | Age: 63
Discharge: HOME | End: 2023-08-02
Payer: COMMERCIAL

## 2023-08-02 ENCOUNTER — HOSPITAL ENCOUNTER (OUTPATIENT)
Dept: CARDIOLOGY | Facility: HOSPITAL | Age: 63
Discharge: HOME | End: 2023-08-02
Payer: COMMERCIAL

## 2023-08-02 ENCOUNTER — LAB (OUTPATIENT)
Dept: LAB | Facility: HOSPITAL | Age: 63
End: 2023-08-02
Payer: COMMERCIAL

## 2023-08-02 DIAGNOSIS — M21.861 ACQUIRED POSTERIOR EQUINUS, RIGHT: ICD-10-CM

## 2023-08-02 DIAGNOSIS — L97.512 CHRONIC ULCER OF RIGHT FOOT WITH FAT LAYER EXPOSED: ICD-10-CM

## 2023-08-02 DIAGNOSIS — M14.671 CHARCOT'S JOINT OF RIGHT FOOT: ICD-10-CM

## 2023-08-02 LAB
ANION GAP SERPL CALCULATED.3IONS-SCNC: 11 MMOL/L (ref 5–15)
BUN SERPL-MCNC: 25 MG/DL (ref 8–23)
BUN/CREAT SERPL: 18.5 (ref 7–25)
CALCIUM SPEC-SCNC: 10.1 MG/DL (ref 8.6–10.5)
CHLORIDE SERPL-SCNC: 103 MMOL/L (ref 98–107)
CO2 SERPL-SCNC: 24 MMOL/L (ref 22–29)
CREAT SERPL-MCNC: 1.35 MG/DL (ref 0.76–1.27)
DEPRECATED RDW RBC AUTO: 44 FL (ref 37–54)
EGFRCR SERPLBLD CKD-EPI 2021: 59.7 ML/MIN/1.73
ERYTHROCYTE [DISTWIDTH] IN BLOOD BY AUTOMATED COUNT: 13.6 % (ref 12.3–15.4)
GLUCOSE SERPL-MCNC: 116 MG/DL (ref 65–99)
HCT VFR BLD AUTO: 41.7 % (ref 37.5–51)
HGB BLD-MCNC: 14.1 G/DL (ref 13–17.7)
MCH RBC QN AUTO: 30.2 PG (ref 26.6–33)
MCHC RBC AUTO-ENTMCNC: 33.8 G/DL (ref 31.5–35.7)
MCV RBC AUTO: 89.3 FL (ref 79–97)
PLATELET # BLD AUTO: 231 10*3/MM3 (ref 140–450)
PMV BLD AUTO: 10.8 FL (ref 6–12)
POTASSIUM SERPL-SCNC: 4.6 MMOL/L (ref 3.5–5.2)
RBC # BLD AUTO: 4.67 10*6/MM3 (ref 4.14–5.8)
SODIUM SERPL-SCNC: 138 MMOL/L (ref 136–145)
WBC NRBC COR # BLD: 6.14 10*3/MM3 (ref 3.4–10.8)

## 2023-08-02 PROCEDURE — 93005 ELECTROCARDIOGRAM TRACING: CPT | Performed by: PODIATRIST

## 2023-08-02 PROCEDURE — 85027 COMPLETE CBC AUTOMATED: CPT

## 2023-08-02 PROCEDURE — 80048 BASIC METABOLIC PNL TOTAL CA: CPT

## 2023-08-02 PROCEDURE — 36415 COLL VENOUS BLD VENIPUNCTURE: CPT

## 2023-08-02 PROCEDURE — 71046 X-RAY EXAM CHEST 2 VIEWS: CPT

## 2023-08-04 LAB — QT INTERVAL: 413 MS

## 2023-08-10 ENCOUNTER — ANESTHESIA EVENT (OUTPATIENT)
Dept: PERIOP | Facility: HOSPITAL | Age: 63
End: 2023-08-10
Payer: COMMERCIAL

## 2023-08-11 ENCOUNTER — HOSPITAL ENCOUNTER (OUTPATIENT)
Facility: HOSPITAL | Age: 63
Setting detail: HOSPITAL OUTPATIENT SURGERY
Discharge: HOME | End: 2023-08-11
Attending: PODIATRIST | Admitting: PODIATRIST
Payer: COMMERCIAL

## 2023-08-11 ENCOUNTER — TELEPHONE (OUTPATIENT)
Dept: PODIATRY | Facility: CLINIC | Age: 63
End: 2023-08-11
Payer: COMMERCIAL

## 2023-08-11 ENCOUNTER — ANESTHESIA (OUTPATIENT)
Dept: PERIOP | Facility: HOSPITAL | Age: 63
End: 2023-08-11
Payer: COMMERCIAL

## 2023-08-11 VITALS
SYSTOLIC BLOOD PRESSURE: 148 MMHG | RESPIRATION RATE: 12 BRPM | DIASTOLIC BLOOD PRESSURE: 77 MMHG | HEIGHT: 73 IN | WEIGHT: 262 LBS | BODY MASS INDEX: 34.72 KG/M2 | HEART RATE: 68 BPM | OXYGEN SATURATION: 98 % | TEMPERATURE: 97.2 F

## 2023-08-11 DIAGNOSIS — M14.671 CHARCOT'S JOINT OF RIGHT FOOT: ICD-10-CM

## 2023-08-11 DIAGNOSIS — L97.512 CHRONIC ULCER OF RIGHT FOOT WITH FAT LAYER EXPOSED: ICD-10-CM

## 2023-08-11 DIAGNOSIS — M21.861 ACQUIRED POSTERIOR EQUINUS, RIGHT: ICD-10-CM

## 2023-08-11 LAB
GLUCOSE BLDC GLUCOMTR-MCNC: 122 MG/DL (ref 70–105)
GLUCOSE BLDC GLUCOMTR-MCNC: 150 MG/DL (ref 70–105)

## 2023-08-11 PROCEDURE — 25010000002 BUPIVACAINE 0.5 % SOLUTION: Performed by: PODIATRIST

## 2023-08-11 PROCEDURE — 27687 REVISION OF CALF TENDON: CPT | Performed by: PODIATRIST

## 2023-08-11 PROCEDURE — 28122 PARTIAL REMOVAL OF FOOT BONE: CPT | Performed by: PODIATRIST

## 2023-08-11 PROCEDURE — 25010000002 DEXAMETHASONE SODIUM PHOSPHATE 20 MG/5ML SOLUTION: Performed by: NURSE ANESTHETIST, CERTIFIED REGISTERED

## 2023-08-11 PROCEDURE — 25010000002 ONDANSETRON PER 1 MG: Performed by: NURSE ANESTHETIST, CERTIFIED REGISTERED

## 2023-08-11 PROCEDURE — 25010000002 PROPOFOL 200 MG/20ML EMULSION: Performed by: NURSE ANESTHETIST, CERTIFIED REGISTERED

## 2023-08-11 PROCEDURE — 82948 REAGENT STRIP/BLOOD GLUCOSE: CPT

## 2023-08-11 PROCEDURE — 25010000002 SUCCINYLCHOLINE PER 20 MG: Performed by: NURSE ANESTHETIST, CERTIFIED REGISTERED

## 2023-08-11 PROCEDURE — 25010000002 FENTANYL CITRATE (PF) 100 MCG/2ML SOLUTION: Performed by: NURSE ANESTHETIST, CERTIFIED REGISTERED

## 2023-08-11 PROCEDURE — 25010000002 MIDAZOLAM PER 1 MG: Performed by: NURSE ANESTHETIST, CERTIFIED REGISTERED

## 2023-08-11 PROCEDURE — 25010000002 CEFAZOLIN PER 500 MG: Performed by: PODIATRIST

## 2023-08-11 PROCEDURE — 25010000002 HYDROMORPHONE 1 MG/ML SOLUTION: Performed by: NURSE ANESTHETIST, CERTIFIED REGISTERED

## 2023-08-11 PROCEDURE — 25010000002 SUGAMMADEX 200 MG/2ML SOLUTION: Performed by: NURSE ANESTHETIST, CERTIFIED REGISTERED

## 2023-08-11 RX ORDER — MIDAZOLAM HYDROCHLORIDE 1 MG/ML
INJECTION INTRAMUSCULAR; INTRAVENOUS AS NEEDED
Status: DISCONTINUED | OUTPATIENT
Start: 2023-08-11 | End: 2023-08-11 | Stop reason: SURG

## 2023-08-11 RX ORDER — DROPERIDOL 2.5 MG/ML
0.62 INJECTION, SOLUTION INTRAMUSCULAR; INTRAVENOUS
Status: DISCONTINUED | OUTPATIENT
Start: 2023-08-11 | End: 2023-08-11 | Stop reason: HOSPADM

## 2023-08-11 RX ORDER — HYDROCODONE BITARTRATE AND ACETAMINOPHEN 7.5; 325 MG/1; MG/1
1 TABLET ORAL EVERY 6 HOURS PRN
Qty: 28 TABLET | Refills: 0 | Status: SHIPPED | OUTPATIENT
Start: 2023-08-11

## 2023-08-11 RX ORDER — PROMETHAZINE HYDROCHLORIDE 25 MG/1
25 TABLET ORAL ONCE AS NEEDED
Status: DISCONTINUED | OUTPATIENT
Start: 2023-08-11 | End: 2023-08-11 | Stop reason: HOSPADM

## 2023-08-11 RX ORDER — BUPIVACAINE HYDROCHLORIDE 5 MG/ML
INJECTION, SOLUTION PERINEURAL AS NEEDED
Status: DISCONTINUED | OUTPATIENT
Start: 2023-08-11 | End: 2023-08-11 | Stop reason: HOSPADM

## 2023-08-11 RX ORDER — SODIUM CHLORIDE 0.9 % (FLUSH) 0.9 %
10 SYRINGE (ML) INJECTION AS NEEDED
Status: DISCONTINUED | OUTPATIENT
Start: 2023-08-11 | End: 2023-08-11 | Stop reason: HOSPADM

## 2023-08-11 RX ORDER — ROCURONIUM BROMIDE 10 MG/ML
INJECTION, SOLUTION INTRAVENOUS AS NEEDED
Status: DISCONTINUED | OUTPATIENT
Start: 2023-08-11 | End: 2023-08-11 | Stop reason: SURG

## 2023-08-11 RX ORDER — PROMETHAZINE HYDROCHLORIDE 25 MG/1
25 SUPPOSITORY RECTAL ONCE AS NEEDED
Status: DISCONTINUED | OUTPATIENT
Start: 2023-08-11 | End: 2023-08-11 | Stop reason: HOSPADM

## 2023-08-11 RX ORDER — LIDOCAINE HYDROCHLORIDE 10 MG/ML
0.5 INJECTION, SOLUTION INFILTRATION; PERINEURAL ONCE AS NEEDED
Status: DISCONTINUED | OUTPATIENT
Start: 2023-08-11 | End: 2023-08-11 | Stop reason: HOSPADM

## 2023-08-11 RX ORDER — HYDROCODONE BITARTRATE AND ACETAMINOPHEN 5; 325 MG/1; MG/1
1 TABLET ORAL ONCE AS NEEDED
Status: COMPLETED | OUTPATIENT
Start: 2023-08-11 | End: 2023-08-11

## 2023-08-11 RX ORDER — FLUMAZENIL 0.1 MG/ML
0.2 INJECTION INTRAVENOUS AS NEEDED
Status: DISCONTINUED | OUTPATIENT
Start: 2023-08-11 | End: 2023-08-11 | Stop reason: HOSPADM

## 2023-08-11 RX ORDER — SUCCINYLCHOLINE CHLORIDE 20 MG/ML
INJECTION INTRAMUSCULAR; INTRAVENOUS AS NEEDED
Status: DISCONTINUED | OUTPATIENT
Start: 2023-08-11 | End: 2023-08-11 | Stop reason: SURG

## 2023-08-11 RX ORDER — LABETALOL HYDROCHLORIDE 5 MG/ML
5 INJECTION, SOLUTION INTRAVENOUS
Status: DISCONTINUED | OUTPATIENT
Start: 2023-08-11 | End: 2023-08-11 | Stop reason: HOSPADM

## 2023-08-11 RX ORDER — HYDROCODONE BITARTRATE AND ACETAMINOPHEN 7.5; 325 MG/1; MG/1
1 TABLET ORAL EVERY 6 HOURS PRN
Qty: 28 TABLET | Refills: 0 | Status: SHIPPED | OUTPATIENT
Start: 2023-08-11 | End: 2023-08-11 | Stop reason: SDUPTHER

## 2023-08-11 RX ORDER — IPRATROPIUM BROMIDE AND ALBUTEROL SULFATE 2.5; .5 MG/3ML; MG/3ML
3 SOLUTION RESPIRATORY (INHALATION) ONCE AS NEEDED
Status: DISCONTINUED | OUTPATIENT
Start: 2023-08-11 | End: 2023-08-11 | Stop reason: HOSPADM

## 2023-08-11 RX ORDER — DIPHENHYDRAMINE HYDROCHLORIDE 50 MG/ML
12.5 INJECTION INTRAMUSCULAR; INTRAVENOUS
Status: DISCONTINUED | OUTPATIENT
Start: 2023-08-11 | End: 2023-08-11 | Stop reason: HOSPADM

## 2023-08-11 RX ORDER — FENTANYL CITRATE 50 UG/ML
50 INJECTION, SOLUTION INTRAMUSCULAR; INTRAVENOUS
Status: DISCONTINUED | OUTPATIENT
Start: 2023-08-11 | End: 2023-08-11 | Stop reason: HOSPADM

## 2023-08-11 RX ORDER — LIDOCAINE HYDROCHLORIDE 20 MG/ML
INJECTION, SOLUTION EPIDURAL; INFILTRATION; INTRACAUDAL; PERINEURAL AS NEEDED
Status: DISCONTINUED | OUTPATIENT
Start: 2023-08-11 | End: 2023-08-11 | Stop reason: SURG

## 2023-08-11 RX ORDER — ONDANSETRON 2 MG/ML
INJECTION INTRAMUSCULAR; INTRAVENOUS AS NEEDED
Status: DISCONTINUED | OUTPATIENT
Start: 2023-08-11 | End: 2023-08-11 | Stop reason: SURG

## 2023-08-11 RX ORDER — HYDRALAZINE HYDROCHLORIDE 20 MG/ML
5 INJECTION INTRAMUSCULAR; INTRAVENOUS
Status: DISCONTINUED | OUTPATIENT
Start: 2023-08-11 | End: 2023-08-11 | Stop reason: HOSPADM

## 2023-08-11 RX ORDER — SODIUM CHLORIDE, SODIUM LACTATE, POTASSIUM CHLORIDE, CALCIUM CHLORIDE 600; 310; 30; 20 MG/100ML; MG/100ML; MG/100ML; MG/100ML
1000 INJECTION, SOLUTION INTRAVENOUS CONTINUOUS
Status: DISCONTINUED | OUTPATIENT
Start: 2023-08-11 | End: 2023-08-11 | Stop reason: HOSPADM

## 2023-08-11 RX ORDER — EPHEDRINE SULFATE 5 MG/ML
INJECTION INTRAVENOUS AS NEEDED
Status: DISCONTINUED | OUTPATIENT
Start: 2023-08-11 | End: 2023-08-11 | Stop reason: SURG

## 2023-08-11 RX ORDER — PROPOFOL 10 MG/ML
INJECTION, EMULSION INTRAVENOUS AS NEEDED
Status: DISCONTINUED | OUTPATIENT
Start: 2023-08-11 | End: 2023-08-11 | Stop reason: SURG

## 2023-08-11 RX ORDER — ONDANSETRON 2 MG/ML
4 INJECTION INTRAMUSCULAR; INTRAVENOUS ONCE AS NEEDED
Status: DISCONTINUED | OUTPATIENT
Start: 2023-08-11 | End: 2023-08-11 | Stop reason: HOSPADM

## 2023-08-11 RX ORDER — NALOXONE HCL 0.4 MG/ML
0.2 VIAL (ML) INJECTION AS NEEDED
Status: DISCONTINUED | OUTPATIENT
Start: 2023-08-11 | End: 2023-08-11 | Stop reason: HOSPADM

## 2023-08-11 RX ORDER — DEXAMETHASONE SODIUM PHOSPHATE 4 MG/ML
INJECTION, SOLUTION INTRA-ARTICULAR; INTRALESIONAL; INTRAMUSCULAR; INTRAVENOUS; SOFT TISSUE AS NEEDED
Status: DISCONTINUED | OUTPATIENT
Start: 2023-08-11 | End: 2023-08-11 | Stop reason: SURG

## 2023-08-11 RX ORDER — FENTANYL CITRATE 50 UG/ML
INJECTION, SOLUTION INTRAMUSCULAR; INTRAVENOUS AS NEEDED
Status: DISCONTINUED | OUTPATIENT
Start: 2023-08-11 | End: 2023-08-11 | Stop reason: SURG

## 2023-08-11 RX ADMIN — MIDAZOLAM 2 MG: 1 INJECTION INTRAMUSCULAR; INTRAVENOUS at 07:22

## 2023-08-11 RX ADMIN — SUGAMMADEX 200 MG: 100 INJECTION, SOLUTION INTRAVENOUS at 08:23

## 2023-08-11 RX ADMIN — CEFAZOLIN 2 G: 2 INJECTION, POWDER, FOR SOLUTION INTRAMUSCULAR; INTRAVENOUS at 07:30

## 2023-08-11 RX ADMIN — FENTANYL CITRATE 100 MCG: 50 INJECTION, SOLUTION INTRAMUSCULAR; INTRAVENOUS at 07:25

## 2023-08-11 RX ADMIN — SODIUM CHLORIDE, POTASSIUM CHLORIDE, SODIUM LACTATE AND CALCIUM CHLORIDE 1000 ML: 600; 310; 30; 20 INJECTION, SOLUTION INTRAVENOUS at 06:30

## 2023-08-11 RX ADMIN — ONDANSETRON 4 MG: 2 INJECTION INTRAMUSCULAR; INTRAVENOUS at 07:30

## 2023-08-11 RX ADMIN — HYDROMORPHONE HYDROCHLORIDE 0.5 MG: 1 INJECTION, SOLUTION INTRAMUSCULAR; INTRAVENOUS; SUBCUTANEOUS at 09:16

## 2023-08-11 RX ADMIN — EPHEDRINE SULFATE 10 MG: 5 INJECTION INTRAVENOUS at 07:57

## 2023-08-11 RX ADMIN — SUCCINYLCHOLINE CHLORIDE 120 MG: 20 INJECTION, SOLUTION INTRAMUSCULAR; INTRAVENOUS at 07:25

## 2023-08-11 RX ADMIN — LIDOCAINE HYDROCHLORIDE 100 MG: 20 INJECTION, SOLUTION EPIDURAL; INFILTRATION; INTRACAUDAL; PERINEURAL at 07:25

## 2023-08-11 RX ADMIN — DEXAMETHASONE SODIUM PHOSPHATE 8 MG: 4 INJECTION, SOLUTION INTRAMUSCULAR; INTRAVENOUS at 07:30

## 2023-08-11 RX ADMIN — ROCURONIUM BROMIDE 20 MG: 50 INJECTION INTRAVENOUS at 07:30

## 2023-08-11 RX ADMIN — PROPOFOL 200 MG: 10 INJECTION, EMULSION INTRAVENOUS at 07:25

## 2023-08-11 RX ADMIN — HYDROCODONE BITARTRATE AND ACETAMINOPHEN 1 TABLET: 5; 325 TABLET ORAL at 09:13

## 2023-08-11 NOTE — ANESTHESIA PREPROCEDURE EVALUATION
Anesthesia Evaluation     Patient summary reviewed and Nursing notes reviewed   history of anesthetic complications:  PONV  NPO Solid Status: > 8 hours  NPO Liquid Status: > 8 hours           Airway   Mallampati: II  TM distance: >3 FB  Neck ROM: full  No difficulty expected  Dental - normal exam     Pulmonary    Cardiovascular     (+) hypertensionhyperlipidemia      Neuro/Psych  (+) numbness  GI/Hepatic/Renal/Endo    (+) obesity, GERD, liver disease, diabetes mellitus    Musculoskeletal     Abdominal    Substance History      OB/GYN          Other   arthritis,                   Anesthesia Plan    ASA 3     general     intravenous induction     Anesthetic plan, risks, benefits, and alternatives have been provided, discussed and informed consent has been obtained with: patient.    Plan discussed with CRNA.    CODE STATUS:

## 2023-08-11 NOTE — TELEPHONE ENCOUNTER
PLEASE CALL / LEAVE VMAIL TO NOTIFY PATIENT WHEN OFFICE HAS BEEN ABLE TO VERIFY NORCO 7.5-325 MG (ONE TABLET EVERY 6 HOURS) IN STOCK     AT [facility] # [id number] PH: [phone] & SCRIPT HAS BEEN SENT FOR POST OP PATIENT - HAD RIGHT CUBOID FOOT SURGERY TODAY 08-11-23     PATIENT's WIFE MICHAEL STATED [facility] TOLD THEM [facility] HAS BEEN OUT OF STOCK OF NORCO MEDICATION SINCE JANUARY THANKS

## 2023-08-11 NOTE — OP NOTE
Operative Note   Foot and Ankle Surgery   Provider: Dr. Robbi Pineda   Location: [facility]      Procedure:  1.  Gastrocnemius recession, right lower extremity  2.  Partial cuboid resection, right foot  3.  Excisional debridement to subcutaneous tissue, right foot    Pre-operative Diagnosis:   1.  Charcot foot deformity, right   2.  Equinus contracture, right lower extremity  3.  Chronic ulcer to subcutaneous tissue, right foot  4.  Type 2 diabetes mellitus with peripheral neuropathy    Post-operative Diagnosis: Same    Surgeon: Robbi Pineda    Assistant: Satnam Pichardo, PGY 2    Anesthesia: General    Implants: None    Findings: No unexpected findings    Specimen: None    Blood Loss: Less than 5cc    Complications: None    Post Op Plan: Discharge home.  Strict nonweightbearing activity.  Follow-up with me in 2 weeks    Summary:    Patient is a pleasant 62-year-old diabetic male that has been seen in office for longstanding wound involving the plantar aspect of his right foot.  Patient does have Charcot foot deformity.  He has had similar issues greater than 2 years ago and underwent cuboid planing.  Patient's wound and symptoms resolved until recently when he had increased activity causing recurrent wound.  Clinically, patient does have equinus contracture which is increasing stress to the midfoot region.  We have attempted conservative care for the wound with some but not complete resolution.  I have recommended that we consider repeat planing of underlying osseous structures as well as gastrocnemius recession.  Patient understands and agrees.    Procedure, risks, complications, and goals were discussed with the patient at bedside.  Risks include but are not limited to infection, complications from anesthesia (including death), chronic pain or numbness, hematoma/seroma, deep vein thrombosis, wound complications, and potential for additional surgical procedures.  Patient understands and elects to proceed  with surgery at this time. Informed consent was obtained before proceeding to the operating suite.  All questions were answered to the patient's satisfaction. No guarantees or assurances were given or implied.    Procedure:     Patient brought to the operating room placed on the operative table in supine position. Once adequate general anesthesia was administered, a pneumatic tourniquet was placed about the patient's operative thigh.  The limb was scrubbed prepped and draped in usual sterile fashion.  The lower extremity was elevated and exsanguinated and the pneumatic tourniquet was inflated to 300 mm of mercury.  A formal time-out was conducted prior skin incision.     Attention was directed to the medial calf region at the myotendinous junction. A linear incision was performed. Blunt dissection was performed to the deep fascia. The saphenous nerve and vein were retracted. A stab incision to the deep fascia was performed. The gastrocnemius aponeurosis was identified and transected from a medial to lateral orientation without complication.  Care was taken to preserve the posterior deep fascia and sural nerve during the transection. A significant release to the equinus contracture was noted on the table. The incision was irrigated with normal saline and closed with a 2-0 vicryl to the deep fascia, 3-0 vicryl to the subcutaneous tissues and skin staples to the skin.     Attention was then directed to the lateral aspect of the foot.  A linear longitudinal incision was performed over previous surgical site.  Incision was deepened to the level of bone and dissection was performed to the level of the cuboid.  Soft tissue envelope was reflected and retracted giving adequate visualization of underlying bony structures.  A sagittal saw was used to remove all hypertrophic bone from the plantar aspect of the cuboid and midfoot.  All rough edges were smoothed with a rasp.  Final evaluation was performed showing significantly  decompressed plantar foot structure.  The wound was irrigated with normal saline.  Deep structures were closed with a 2-0 Vicryl in a simple interrupted manner.  The skin was repaired with a 2-0 nylon and staples.    Lastly, the wound was debrided with a 15 blade removing viable and nonviable skin and subcutaneous tissue to healthy bleeding base.  The wound measured approximately 1 cm in diameter with a depth of 0.4 cm after debridement.    The wound and incision sites were dressed with Xeroform and sterile compressive dressings.  The tourniquet was released and a prompt hyperemic response was noted to all digits of the right lower extremity.  A well-padded posterior splint was applied to the right lower extremity.  Patient tolerated the procedure and anesthesia well.  He was transferred from the operating room to the recovery room with vital signs stable and neurovascular status unchanged to the right lower extremity.      Dr. Robbi Pineda, SARAH  [facility]  [phone]    Note is dictated utilizing voice recognition software. Unfortunately this leads to occasional typographical errors. I apologize in advance if the situation occurs. If questions occur please do not hesitate to call our office.

## 2023-08-11 NOTE — TELEPHONE ENCOUNTER
I canceled Volga RX with [facility] due to errol out of stock.  Volga was re-sent into [facility] on [address].  Patient was notified.

## 2023-08-11 NOTE — TELEPHONE ENCOUNTER
Hub staff attempted to follow warm transfer process and was unsuccessful     Caller: oli nicole    Relationship to patient: Emergency Contact    Best call back number: [phone]    Patient is needing: PATIENT'S WIFE IS ASKING IF A DIFFERENT PAIN MEDICATION CAN BE SENT TO [facility], OR IF A NEW RX FOR NORCO WAS SENT TO [facility]. SHE IS CONCERNED THAT THEY WON'T GET THIS MEDICATION BEFORE THE WEEKEND.

## 2023-08-11 NOTE — ANESTHESIA POSTPROCEDURE EVALUATION
Patient: Agapito Canseco    Procedure Summary       Date: 08/11/23 Room / Location: [facility] OR 06 / [facility] MAIN OR    Anesthesia Start: 0720 Anesthesia Stop: 0835    Procedures:       BONE SPUR LOWER EXTREMITY with wound debridment (Right: Foot)      RECESSION GASTROCNEMIUS (Right) Diagnosis:       Chronic ulcer of right foot with fat layer exposed      Charcot's joint of right foot      Acquired posterior equinus, right      (Chronic ulcer of right foot with fat layer exposed [L97.512])      (Charcot's joint of right foot [M14.671])      (Acquired posterior equinus, right [M21.861])    Surgeons: MAX Pineda DPM Provider: Mateus Felix MD    Anesthesia Type: general ASA Status: 3            Anesthesia Type: general    Vitals  Vitals Value Taken Time   /85 08/11/23 0934   Temp 97 øF (36.1 øC) 08/11/23 0932   Pulse 65 08/11/23 0935   Resp 12 08/11/23 0932   SpO2 94 % 08/11/23 0935   Vitals shown include unvalidated device data.        Post Anesthesia Care and Evaluation    Patient location during evaluation: PACU  Patient participation: complete - patient participated  Level of consciousness: awake  Pain scale: See nurse's notes for pain score.  Pain management: adequate    Airway patency: patent  Anesthetic complications: No anesthetic complications  PONV Status: none  Cardiovascular status: acceptable  Respiratory status: acceptable and spontaneous ventilation  Hydration status: acceptable    Comments: Patient seen and examined postoperatively; vital signs stable; SpO2 greater than or equal to 90%; cardiopulmonary status stable; nausea/vomiting adequately controlled; pain adequately controlled; no apparent anesthesia complications; patient discharged from anesthesia care when discharge criteria were met

## 2023-08-11 NOTE — ANESTHESIA PROCEDURE NOTES
Airway  Urgency: elective    Date/Time: 8/11/2023 7:29 AM  Airway not difficult    General Information and Staff    Patient location during procedure: OR  CRNA/CAA: Mady Clark CRNA    Indications and Patient Condition  Indications for airway management: airway protection    Preoxygenated: yes  Mask difficulty assessment: 0 - not attempted    Final Airway Details  Final airway type: endotracheal airway      Successful airway: ETT  Cuffed: yes   Successful intubation technique: direct laryngoscopy  Facilitating devices/methods: intubating stylet and anterior pressure/BURP  Endotracheal tube insertion site: oral  Blade: Katiuska  Blade size: 4  ETT size (mm): 7.5  Cormack-Lehane Classification: grade IIa - partial view of glottis  Placement verified by: chest auscultation and capnometry   Cuff volume (mL): 8  Measured from: lips  Number of attempts at approach: 1    Additional Comments  Atraumatic placement of ETT, dentition unchanged from preop.

## 2023-08-24 ENCOUNTER — OFFICE VISIT (OUTPATIENT)
Dept: PODIATRY | Facility: CLINIC | Age: 63
End: 2023-08-24
Payer: COMMERCIAL

## 2023-08-24 VITALS — WEIGHT: 262 LBS | HEIGHT: 73 IN | RESPIRATION RATE: 20 BRPM | BODY MASS INDEX: 34.72 KG/M2

## 2023-08-24 DIAGNOSIS — M21.861 ACQUIRED POSTERIOR EQUINUS, RIGHT: Primary | ICD-10-CM

## 2023-08-24 DIAGNOSIS — M14.671 CHARCOT'S JOINT OF RIGHT FOOT: ICD-10-CM

## 2023-08-24 DIAGNOSIS — L97.512 CHRONIC ULCER OF RIGHT FOOT WITH FAT LAYER EXPOSED: ICD-10-CM

## 2023-08-24 DIAGNOSIS — E11.42 DM TYPE 2 WITH DIABETIC PERIPHERAL NEUROPATHY: ICD-10-CM

## 2023-08-24 PROCEDURE — 99024 POSTOP FOLLOW-UP VISIT: CPT | Performed by: PODIATRIST

## 2023-08-24 NOTE — PROGRESS NOTES
08/24/2023  Foot and Ankle Surgery - Established Patient/Follow-up  Provider: Dr. Robbi Pineda DPM  Location: [facility]    Subjective:  Agapito Canseco is a 62 y.o. male.     Chief Complaint   Patient presents with    Right Foot - Post-op     8/11/2023    Gastrocnemius recession, right lower extremity  2.  Partial cuboid resection, right foot  3.  Excisional debridement to subcutaneous tissue, right foot      Post-op     FREDO Britton md 5/18/2023       HPI: The patient is a 62-year-old male who returns to the office today for a follow-up status post left gastrocnemius recession and partial cuboid resection. He is accompanied by an adult female.    The patient states he is doing well, and he is able to tell a difference even though he is not ambulating. The patient states he did not bring his boot with him today, but he notes his current boot is ill-fitting and caused blisters to the medial and lateral aspects of the foot the first week of 05/2023. The adult female adds that the patient was ambulating in the boot quite a bit prior to the blister formation, but he is non-weightbearing currently. The patient reports an incident where he had to perform a hop with his left foot when he lost his balance coming through his front door which produced significant calf pain.    No Known Allergies    Current Outpatient Medications on File Prior to Visit   Medication Sig Dispense Refill    amLODIPine (NORVASC) 10 MG tablet Take 1 tablet by mouth Daily.      aspirin 81 MG chewable tablet Chew 1 tablet Daily.      atorvastatin (LIPITOR) 40 MG tablet Take 1 tablet by mouth Daily.      Continuous Blood Gluc Sensor (FreeStyle Shilo 14 Day Sensor) misc       Dulaglutide (Trulicity) 1.5 MG/0.5ML solution pen-injector Inject 1.5 mg under the skin into the appropriate area as directed 1 (One) Time Per Week. Mondays      Empagliflozin (Jardiance) 25 MG tablet Take 1 tablet by mouth Daily. Do not take dos       "HYDROcodone-acetaminophen (NORCO) 7.5-325 MG per tablet Take 1 tablet by mouth Every 6 (Six) Hours As Needed for Moderate Pain (Pain). 28 tablet 0    Insulin Glargine, 2 Unit Dial, (Toujeo Max SoloStar) 300 UNIT/ML solution pen-injector injection Inject 64 Units under the skin into the appropriate area as directed Daily.      losartan (COZAAR) 100 MG tablet Take 1 tablet by mouth Every Evening.      metFORMIN (GLUCOPHAGE) 1000 MG tablet Take 1 tablet by mouth 2 (Two) Times a Day With Meals.      pantoprazole (PROTONIX) 40 MG EC tablet Take 1 tablet by mouth Daily. Take dos      pioglitazone (ACTOS) 30 MG tablet Take 1 tablet by mouth Every Evening. Do not take dos      vitamin D (ERGOCALCIFEROL) 1.25 MG (68451 UT) capsule capsule Take 1 capsule by mouth 2 (Two) Times a Week. Sunday and Wednesdays       No current facility-administered medications on file prior to visit.       Objective   Resp 20   Ht 185.4 cm (73\")   Wt 119 kg (262 lb)   BMI 34.57 kg/mý     Foot/Ankle Exam  Foot/Ankle Exam  General: No acute distress  HEENT: Neck supple, normal oral mucosa, no masses, no lymphadenopathy  Lungs: Clear bilaterally, no wheezing, no crackles, no rhonchi. Equal excursions.   CV - Normal S1/S2, no murmur, regular rate and rhythm   Abdomen - Soft, nontender, nondistended, normal bowel sounds  Extremities - no edema, no erythema  Neuro - No focal weakness, normal sensation  Psych - Alert and oriented x3  Skin - charcot deformity. Right foot in dressing      05/24/2023: Wound is substantially smaller and nearly healed. Mild periwound hyperkeratotic tissue. No tunneling, tracking or fluctuance. Continued Charcot foot deformity without any obvious progressive changes.     06/13/23 Wound to the plantar aspect of the right foot is much improved with stable eschar formation. No periwound erythema, edema, or signs of infection. Continued equinus contracture with knee extended and flexed.      07/19/2023  Continued open wound " involving the plantar aspect of the foot. Wound is granular and measures approximately 1 cm in diameter. No signs of infection. Continued moderate equinus contracture.    08/24/2023  Incision sites to the lateral aspect of the left foot and posterior aspect of the lower leg are dry and stable with intact staples. No evidence of dehiscence or infection. Plantar wound has improved and is nearly healed. No signs of infection or further concern. Wound is skin deep.    Assessment & Plan   Diagnoses and all orders for this visit:    1. Acquired posterior equinus, right (Primary)    2. Charcot's joint of right foot    3. Chronic ulcer of right foot with fat layer exposed    4. DM type 2 with diabetic peripheral neuropathy        Patient returns after gastrocnemius recession and partial cuboid resection. The incision sites to the lateral aspect of the foot and posterior aspect of the lower leg are dry and stable with intact staples. There is no evidence of dehiscence or infection. The plantar wound has improved and is nearly healed. There are no signs of infection or further concern. Staples were removed today. Recommended applying Betadine to the plantar wound with a Band-Aid. Would like him to remain non-weightbearing with the knee scooter. He may discontinue wearing the boot secondary to prior blister formation and use the splint secured with an ACE wrap instead. He may begin ankle and foot range of motion as well as calf massage. The patient may shower and bathe using a shower chair. He is to follow up in 2 weeks.    No orders of the defined types were placed in this encounter.         Note is dictated utilizing voice recognition software. Unfortunately this leads to occasional typographical errors. I apologize in advance if the situation occurs. If questions occur please do not hesitate to call our office.    Transcribed from ambient dictation for MAX Pineda DPM by Brigida Pereira.  08/24/23   09:53  EDT    Patient or patient representative verbalized consent to the visit recording.  I have personally performed the services described in this document as transcribed by the above individual, and it is both accurate and complete.

## 2023-09-11 ENCOUNTER — OFFICE VISIT (OUTPATIENT)
Dept: PODIATRY | Facility: CLINIC | Age: 63
End: 2023-09-11
Payer: COMMERCIAL

## 2023-09-11 VITALS
HEART RATE: 74 BPM | WEIGHT: 262 LBS | RESPIRATION RATE: 20 BRPM | BODY MASS INDEX: 34.72 KG/M2 | HEIGHT: 73 IN | OXYGEN SATURATION: 98 %

## 2023-09-11 DIAGNOSIS — E11.42 DM TYPE 2 WITH DIABETIC PERIPHERAL NEUROPATHY: ICD-10-CM

## 2023-09-11 DIAGNOSIS — M21.861 ACQUIRED POSTERIOR EQUINUS, RIGHT: ICD-10-CM

## 2023-09-11 DIAGNOSIS — L97.512 CHRONIC ULCER OF RIGHT FOOT WITH FAT LAYER EXPOSED: ICD-10-CM

## 2023-09-11 DIAGNOSIS — M79.672 LEFT FOOT PAIN: Primary | ICD-10-CM

## 2023-09-11 DIAGNOSIS — M14.671 CHARCOT'S JOINT OF RIGHT FOOT: ICD-10-CM

## 2023-09-11 PROCEDURE — 99024 POSTOP FOLLOW-UP VISIT: CPT | Performed by: PODIATRIST

## 2023-09-11 NOTE — PROGRESS NOTES
09/11/2023  Foot and Ankle Surgery - Established Patient/Follow-up  Provider: Dr. Robbi Pineda DPM  Location: [facility]    Subjective:  Agapito Canseco is a 62 y.o. male.     Chief Complaint   Patient presents with    Right Foot - Follow-up     Sx 8/11/2023 Dr Pineda   Gastrocnemius recession  Partial cuboid resection  Excisional debridement to subcutaneous tissue    Follow-up     LU Blank MD date unknown       HPI: The patient is a 62-year-old male who returns approximately 1 month status post right gastrocnemius recession and cuff reduction. He is accompanied by an adult female.    The patient states that he is doing well. He reports that he has been moisturizing his foot with Aquaphor ointment daily since his last visit. He states that he has been performing his exercises. The patient notes that he has a hard bottom plastic shoe at home that comes with an ankle high. He states that on Saturday morning, 09/04/2023, when he got up, he had a little pain in the back of his foot; however, that has resolved.    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 He adds that he was on his feet and walking at a very fast pace all the time in steel toe boots, which he always bought titanium toe because they were lighter. He is wondering what maybe in his younger years contributed to his feet problem that he is having.    The patient states that he has a hump on the bottom of his left foot.    No Known Allergies    Current Outpatient Medications on File Prior to Visit   Medication Sig Dispense Refill    amLODIPine (NORVASC) 10 MG tablet Take 1 tablet by mouth Daily.      aspirin 81 MG chewable tablet Chew 1 tablet Daily.      atorvastatin (LIPITOR) 40 MG tablet Take 1 tablet by mouth Daily.      Continuous Blood Gluc Sensor (FreeStyle Shilo 14 Day Sensor) misc       Dulaglutide (Trulicity) 1.5 MG/0.5ML solution pen-injector Inject 1.5 mg under the skin into the  "appropriate area as directed 1 (One) Time Per Week. Mondays      Empagliflozin (Jardiance) 25 MG tablet Take 1 tablet by mouth Daily. Do not take dos      HYDROcodone-acetaminophen (NORCO) 7.5-325 MG per tablet Take 1 tablet by mouth Every 6 (Six) Hours As Needed for Moderate Pain (Pain). 28 tablet 0    Insulin Glargine, 2 Unit Dial, (Toujeo Max SoloStar) 300 UNIT/ML solution pen-injector injection Inject 64 Units under the skin into the appropriate area as directed Daily.      losartan (COZAAR) 100 MG tablet Take 1 tablet by mouth Every Evening.      metFORMIN (GLUCOPHAGE) 1000 MG tablet Take 1 tablet by mouth 2 (Two) Times a Day With Meals.      pantoprazole (PROTONIX) 40 MG EC tablet Take 1 tablet by mouth Daily. Take dos      pioglitazone (ACTOS) 30 MG tablet Take 1 tablet by mouth Every Evening. Do not take dos      vitamin D (ERGOCALCIFEROL) 1.25 MG (48976 UT) capsule capsule Take 1 capsule by mouth 2 (Two) Times a Week. Sunday and Wednesdays       No current facility-administered medications on file prior to visit.       Objective   Pulse 74   Resp 20   Ht 185.4 cm (73\")   Wt 119 kg (262 lb)   SpO2 98%   BMI 34.57 kg/m²     Foot/Ankle Exam  General: No acute distress  HEENT: Neck supple, normal oral mucosa, no masses, no lymphadenopathy  Lungs: Clear bilaterally, no wheezing, no crackles, no rhonchi. Equal excursions.   CV - Normal S1/S2, no murmur, regular rate and rhythm   Abdomen - Soft, nontender, nondistended, normal bowel sounds  Extremities - no edema, no erythema  Neuro - No focal weakness, normal sensation  Psych - Alert and oriented x3  Skin - charcot deformity. Right foot in dressing      05/24/2023: Wound is substantially smaller and nearly healed. Mild periwound hyperkeratotic tissue. No tunneling, tracking or fluctuance. Continued Charcot foot deformity without any obvious progressive changes.     06/13/23 Wound to the plantar aspect of the right foot is much improved with stable eschar " formation. No periwound erythema, edema, or signs of infection. Continued equinus contracture with knee extended and flexed.      07/19/2023  Continued open wound involving the plantar aspect of the foot. Wound is granular and measures approximately 1 cm in diameter. No signs of infection. Continued moderate equinus contracture.    08/24/2023  Incision sites to the lateral aspect of the left foot and posterior aspect of the lower leg are dry and stable with intact staples. No evidence of dehiscence or infection. Plantar wound has improved and is nearly healed. No signs of infection or further concern. Wound is skin deep.    09/11/2023:  Incision sites and wound are well healed to the right lower extremity. Mild overlying eschar to the plantar foot wound. No signs of infection or further concern.    Assessment & Plan   Diagnoses and all orders for this visit:    1. Left foot pain (Primary)  -     XR Foot 3+ View Left    2. Acquired posterior equinus, right    3. Charcot's joint of right foot    4. Chronic ulcer of right foot with fat layer exposed    5. DM type 2 with diabetic peripheral neuropathy      Patient returns approximately 1 month after gastrocnemius recession and reduction. He is doing well overall. I discussed with the patient that he can discontinue using the knee scooter at this time. I would like him to continue wearing the boot for the next 1 to 2 weeks, especially out of the house. I discussed with the patient that he has a lot more range of motion than what he had previously. I would like him to continue stretching exercises. I discussed with the patient that the soft tissue issues work over the course of 2 years. I discussed with the patient that his right foot is neuropathy. I discussed with the patient that the vast majority of why and share this problem is that neuropathy that caused this issue. I discussed with the patient that any type of injury or previous surgery will give him a new normal.  I discussed with the patient that it can create progressive problems of arthritis changes on how his foot works. I discussed with the patient that we do not have a whole lot of options for conservative care when it gets to a point because he has so much of a structural issue. I discussed with the patient that he can drive as long as he is not driving in the boot. I will order an x-ray of his left foot today. I will review the x-ray and discuss it when I see him back in 4 weeks.    Orders Placed This Encounter   Procedures    XR Foot 3+ View Left     Order Specific Question:   Reason for Exam:     Answer:   left foot pain rm 14 wb can leave afterwards     Order Specific Question:   Does this patient have a diabetic monitoring/medication delivering device on?     Answer:   No     Order Specific Question:   Release to patient     Answer:   Routine Release [[id number]]          Note is dictated utilizing voice recognition software. Unfortunately this leads to occasional typographical errors. I apologize in advance if the situation occurs. If questions occur please do not hesitate to call our office.    Transcribed from ambient dictation for MAX Pineda DPM by Floridalma Plasencia.  09/11/23   10:04 EDT    Patient or patient representative verbalized consent to the visit recording.  I have personally performed the services described in this document as transcribed by the above individual, and it is both accurate and complete.

## 2023-10-10 ENCOUNTER — OFFICE VISIT (OUTPATIENT)
Dept: PODIATRY | Facility: CLINIC | Age: 63
End: 2023-10-10
Payer: COMMERCIAL

## 2023-10-10 VITALS — HEART RATE: 77 BPM | OXYGEN SATURATION: 96 % | HEIGHT: 73 IN | BODY MASS INDEX: 34.72 KG/M2 | WEIGHT: 262 LBS

## 2023-10-10 DIAGNOSIS — E11.42 DM TYPE 2 WITH DIABETIC PERIPHERAL NEUROPATHY: ICD-10-CM

## 2023-10-10 DIAGNOSIS — M21.861 ACQUIRED POSTERIOR EQUINUS, RIGHT: ICD-10-CM

## 2023-10-10 DIAGNOSIS — M14.671 CHARCOT'S JOINT OF RIGHT FOOT: ICD-10-CM

## 2023-10-10 DIAGNOSIS — M79.672 LEFT FOOT PAIN: Primary | ICD-10-CM

## 2023-10-10 NOTE — PROGRESS NOTES
"10/10/2023  Foot and Ankle Surgery - Established Patient/Follow-up  Provider: Dr. Robbi Pineda DPM  Location: [facility]    Subjective:  Agapito Canseco is a 62 y.o. male.     Chief Complaint   Patient presents with    Right Foot - Follow-up     Sx 8/11/2023 Dr Pineda   Gastrocnemius recession  Partial cuboid resection  Excisional debridement to subcutaneous tissue      Follow-up     LU Blank MD 08/05/2023       HPI: The patient is a 62-year-old male who presents to the office today for a follow-up regarding his right lower extremity approximately 2 months status post surgery.    The patient is doing well overall. He has been wearing a regular shoe for approximately 2 weeks. He reports the mild discomfort he was experiencing with ambulation has resolved. The patient states he has not been ambulating any great distance, but he noticed he was developing shin splints and feeling a \"clicking\" sensation when ambulating a short distance at [facility] the other day while wearing light-weight hiking boots. The patient states his activity has been limited, although he considered using his pedal-driven kayak yesterday, 10/09/2023, as he believes it may be beneficial, but it was too cold.      No Known Allergies    Current Outpatient Medications on File Prior to Visit   Medication Sig Dispense Refill    amLODIPine (NORVASC) 10 MG tablet Take 1 tablet by mouth Daily.      aspirin 81 MG chewable tablet Chew 1 tablet Daily.      atorvastatin (LIPITOR) 40 MG tablet Take 1 tablet by mouth Daily.      Continuous Blood Gluc Sensor (FreeStyle Shilo 14 Day Sensor) misc       Dulaglutide (Trulicity) 1.5 MG/0.5ML solution pen-injector Inject 1.5 mg under the skin into the appropriate area as directed 1 (One) Time Per Week. Mondays      Empagliflozin (Jardiance) 25 MG tablet Take 1 tablet by mouth Daily. Do not take dos      HYDROcodone-acetaminophen (NORCO) 7.5-325 MG per tablet Take 1 tablet by mouth Every 6 (Six) Hours As " "Needed for Moderate Pain (Pain). 28 tablet 0    Insulin Glargine, 2 Unit Dial, (Toujeo Max SoloStar) 300 UNIT/ML solution pen-injector injection Inject 64 Units under the skin into the appropriate area as directed Daily.      losartan (COZAAR) 100 MG tablet Take 1 tablet by mouth Every Evening.      metFORMIN (GLUCOPHAGE) 1000 MG tablet Take 1 tablet by mouth 2 (Two) Times a Day With Meals.      pantoprazole (PROTONIX) 40 MG EC tablet Take 1 tablet by mouth Daily. Take dos      pioglitazone (ACTOS) 30 MG tablet Take 1 tablet by mouth Every Evening. Do not take dos      vitamin D (ERGOCALCIFEROL) 1.25 MG (27417 UT) capsule capsule Take 1 capsule by mouth 2 (Two) Times a Week. Sunday and Wednesdays       No current facility-administered medications on file prior to visit.       Objective   Pulse 77   Ht 185.4 cm (73\")   Wt 119 kg (262 lb)   SpO2 96%   BMI 34.57 kg/mý     Foot/Ankle Exam  General: No acute distress  HEENT: Neck supple, normal oral mucosa, no masses, no lymphadenopathy  Lungs: Clear bilaterally, no wheezing, no crackles, no rhonchi. Equal excursions.   CV - Normal S1/S2, no murmur, regular rate and rhythm   Abdomen - Soft, nontender, nondistended, normal bowel sounds  Extremities - no edema, no erythema  Neuro - No focal weakness, normal sensation  Psych - Alert and oriented x3  Skin - charcot deformity. Right foot in dressing      05/24/2023: Wound is substantially smaller and nearly healed. Mild periwound hyperkeratotic tissue. No tunneling, tracking or fluctuance. Continued Charcot foot deformity without any obvious progressive changes.     06/13/23 Wound to the plantar aspect of the right foot is much improved with stable eschar formation. No periwound erythema, edema, or signs of infection. Continued equinus contracture with knee extended and flexed.      07/19/2023  Continued open wound involving the plantar aspect of the foot. Wound is granular and measures approximately 1 cm in diameter. No " signs of infection. Continued moderate equinus contracture.     08/24/2023  Incision sites to the lateral aspect of the left foot and posterior aspect of the lower leg are dry and stable with intact staples. No evidence of dehiscence or infection. Plantar wound has improved and is nearly healed. No signs of infection or further concern. Wound is skin deep.     09/11/2023:  Incision sites and wound are well healed to the right lower extremity. Mild overlying eschar to the plantar foot wound. No signs of infection or further concern.    10/09/2023  Wound is now healed to the plantar aspect of the midfoot. Incision site to the lateral aspect of the foot is well healed as well as the calf. No discomfort. No significant equinus contracture involving the right lower extremity. No progressive deformity or instability.    Assessment & Plan   Diagnoses and all orders for this visit:    1. Left foot pain (Primary)    2. Acquired posterior equinus, right    3. Charcot's joint of right foot    4. DM type 2 with diabetic peripheral neuropathy        Patient returns for follow-up regarding his right lower extremity approximately 2 months status post surgery. No results were obtained or interpreted today. The patient is doing well overall. The wound to the plantar aspect of his right midfoot is now healed. The incision site to the lateral aspect of the foot is well healed as is the calf. Recommended continuing stretching and low-impact exercises and he may include his left lower extremity in this as well. Advised he may need to consider lifestyle modifications given that he has issues with his left lower extremity as well. Advised the patient to be cautious with hiking shoes as they may be too supportive and attempt to change his foot structure which, when combined with increased activity, may result in blisters or ulcers. The patient will follow up in 3 months for re-evaluation.    No orders of the defined types were placed in  this encounter.         Note is dictated utilizing voice recognition software. Unfortunately this leads to occasional typographical errors. I apologize in advance if the situation occurs. If questions occur please do not hesitate to call our office.    Transcribed from ambient dictation for MAX Pineda DPM by Brigida Pereira.  10/10/23   09:46 EDT    Patient or patient representative verbalized consent to the visit recording.  I have personally performed the services described in this document as transcribed by the above individual, and it is both accurate and complete.

## 2024-04-10 NOTE — DISCHARGE SUMMARY
Date of Admission: 11/14/2019    Date of Discharge:  11/19/2019    Length of stay:  LOS: 1 day     Presenting Problem/History of Present Illness  Active Hospital Problems    Diagnosis  POA   • **Infected diabetic foot ulcer (CMS/HCC) [E11.621, L97.509]  Yes     Priority: High   • Obesity (BMI 30-39.9) [E66.9]  Yes     Priority: Medium   • Diabetic foot infection (CMS/HCC) [E11.628, L08.9]  Yes     Priority: Medium   • Hyperlipidemia [E78.5]  Yes     Priority: Medium   • Charcot foot due to diabetes mellitus (CMS/HCC) [E11.610]  Yes     Priority: Medium   • Type II diabetes mellitus (CMS/HCC) [E11.9]  Yes     Priority: Medium   • GERD (gastroesophageal reflux disease) [K21.9]  Yes     Priority: Medium   • Essential hypertension [I10]  Yes     Priority: Medium      Resolved Hospital Problems   No resolved problems to display.      Chief complaint: Right foot toe ulcer    Hospital Course       The patient is a pleasant 59-year-old male with history of IDDM complicated with neuropathy and right Charcot foot.  He has history of right foot plantar abscess and debridement about 1.5 years ago and recent debridement again of the right foot plantar surface that required cast.  The patient apparently noticed right foot first toe dorsal surface erythema about 5 days ago and progressively got worse therefore went to the podiatrist office on 11/14/2019 and was sent to [facility] for direct admission.     Diabetes mellitus was diagnosed in 1995 and has been on insulin for about 10 years and is on high doses of long-acting insulin.    The patient was admitted to the medical floor.  He was started on vancomycin and cefepime.  Podiatry and ID were consulted.  The patient underwent x-ray and MRI of the right foot on 11/15/2019 which was negative for osteomyelitis.  Arterial Doppler of the lower extremities was done on 11/19/2019 and showed normal MARISELA.  Wound cultures grew staph aureus and the patient will be discharged on  dicloxacillin for 2 weeks.      Past Medical History:     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   • Elevated cholesterol    • GERD (gastroesophageal reflux disease)    • Hypertension        Past Surgical History:     Past Surgical History:   Procedure Laterality Date   • CARDIAC CATHETERIZATION      no stent   • FOOT SURGERY      left foot tendons and bones   • HERNIA REPAIR         Social History:   Social History     Socioeconomic History   • Marital status:      Spouse name: Not on file   • Number of children: Not on file   • Years of education: Not on file   • Highest education level: Not on file   Tobacco Use   • Smoking status: Never Smoker   • Smokeless tobacco: Never Used   Substance and Sexual Activity   • Alcohol use: No     Frequency: Never   • Drug use: No   • Sexual activity: Defer       Procedures Performed: none         Vital Signs  Temp:  [97.7 °F (36.5 °C)-98.3 °F (36.8 °C)] 98 °F (36.7 °C)  Heart Rate:  [65-73] 65  Resp:  [15-18] 18  BP: (153-168)/(76-92) 158/84    Physical Exam:  Physical Exam   Constitutional: He is oriented to person, place, and time. He appears well-developed and well-nourished.   HENT:   Head: Normocephalic and atraumatic.   Eyes: EOM are normal. Pupils are equal, round, and reactive to light.   Neck: Normal range of motion. Neck supple.   Cardiovascular: Normal rate and normal heart sounds.   Pulmonary/Chest: Effort normal and breath sounds normal.   Abdominal: Soft. Bowel sounds are normal.   Musculoskeletal:   Good range of motion throughout        Lymphadenopathy:     He has no cervical adenopathy.   Neurological: He is alert and oriented to person, place, and time.   Skin: Skin is warm and dry.   Psychiatric: He has a normal mood and affect. His speech is normal and behavior is normal. Thought content normal.       Discharge Diagnosis:     Infected diabetic foot ulcer (CMS/HCC)     Hyperlipidemia    Charcot foot due to diabetes mellitus (CMS/[facility])    Type II diabetes mellitus (CMS/[facility])    GERD (gastroesophageal reflux disease)    Essential hypertension    Obesity (BMI 30-39.9)    Diabetic foot infection (CMS/[facility])      Present on Admission:  • Infected diabetic foot ulcer (CMS/[facility])  • Hyperlipidemia  • Charcot foot due to diabetes mellitus (CMS/[facility])  • Type II diabetes mellitus (CMS/[facility])  • GERD (gastroesophageal reflux disease)  • Essential hypertension  • Obesity (BMI 30-39.9)  • Diabetic foot infection (CMS/[facility])      Discharge Disposition  Home or Self Care       Discharge Medications      New Medications      Instructions Start Date   dicloxacillin 500 MG capsule  Commonly known as:  DYNAPEN   500 mg, Oral, 4 Times Daily         Continue These Medications      Instructions Start Date   ammonium lactate 12 % cream  Commonly known as:  AMLACTIN   1 application, Topical, As Needed      aspirin 81 MG chewable tablet   81 mg, Oral, Daily      atorvastatin 40 MG tablet  Commonly known as:  LIPITOR   40 mg, Oral, Daily      LOSARTAN POTASSIUM PO   100 mg, Oral, Daily      metFORMIN 1000 MG tablet  Commonly known as:  GLUCOPHAGE   1,000 mg, Oral, 2 Times Daily With Meals      metoprolol succinate XL 50 MG 24 hr tablet  Commonly known as:  TOPROL-XL   50 mg, Oral, Daily      pantoprazole 40 MG EC tablet  Commonly known as:  PROTONIX   40 mg, Oral, Daily      pioglitazone 30 MG tablet  Commonly known as:  ACTOS   30 mg, Oral, Daily      raNITIdine 150 MG tablet  Commonly known as:  ZANTAC   150 mg, Oral, Daily      TRESIBA 100 UNIT/ML solution injection  Generic drug:  Insulin Degludec   152 Units, Subcutaneous, Daily, Patient takes 2 injections of 76 units of Tresiba in the morning, 76 units on one side of the abdomen and 76 units on the other side of the abdomen for a total of 152 units daily in the AM       triamcinolone 0.1 % ointment  Commonly known as:  KENALOG   1 application, Topical, 3 Times  Daily, Apply to right foot       triamcinolone 0.5 % cream  Commonly known as:  KENALOG   Topical, 3 Times Daily PRN      VICTOZA SC   1.8 mg, Subcutaneous, Daily      VITAMIN D2 PO   1.25 mg, Oral, 2 Times Weekly, Patient taks on Sundays and Wednesdays            Consults:   Consults     Date and Time Order Name Status Description    11/15/2019 1037 Inpatient Infectious Diseases Consult Completed     11/14/2019 2355 Inpatient Podiatry Consult            Pertinent Test Results:     I reviewed the patient's new clinical results.    Results from last 7 days   Lab Units 11/19/19  0241 11/18/19  0436 11/17/19  0421   WBC 10*3/mm3 6.90 6.30 8.80   HEMOGLOBIN g/dL 12.8* 12.6* 13.0   HEMATOCRIT % 37.4* 36.3* 38.3   PLATELETS 10*3/mm3 214 201 200     Results from last 7 days   Lab Units 11/19/19  0241 11/18/19  0436 11/17/19  0421   SODIUM mmol/L 138 139 141   POTASSIUM mmol/L 3.9 4.6 4.3   CHLORIDE mmol/L 102 105 106   CO2 mmol/L 25.0 24.0 22.0   BUN mg/dL 10 13 14   CREATININE mg/dL 1.10 0.97 1.00   GLUCOSE mg/dL 150* 178* 125*   CALCIUM mg/dL 9.3 9.1 9.1     Results from last 7 days   Lab Units 11/19/19  0241 11/18/19  0436 11/17/19  0421   SODIUM mmol/L 138 139 141   POTASSIUM mmol/L 3.9 4.6 4.3   CHLORIDE mmol/L 102 105 106   CO2 mmol/L 25.0 24.0 22.0   BUN mg/dL 10 13 14   CREATININE mg/dL 1.10 0.97 1.00   CALCIUM mg/dL 9.3 9.1 9.1   BILIRUBIN mg/dL 0.4 0.5 0.6   ALK PHOS U/L 58 55 56   ALT (SGPT) U/L 34 35 37   AST (SGOT) U/L 21 23 25   GLUCOSE mg/dL 150* 178* 125*     Results from last 7 days   Lab Units 11/16/19  0514 11/15/19  0430   MAGNESIUM mg/dL 2.0 1.7     Lab Results   Component Value Date    CALCIUM 9.3 11/19/2019    PHOS 3.7 11/15/2019     No results found for: HGBA1C          Microbiology Results (last 10 days)     Procedure Component Value - Date/Time    Wound Culture - Wound, Toe, Right [[id number]]  (Abnormal)  (Susceptibility) Collected:  11/15/19 1403    Lab Status:  Final result Specimen:  Wound from  Toe, Right Updated:  11/17/19 0714     Wound Culture Scant growth (1+) Staphylococcus aureus     Gram Stain No organisms seen    Susceptibility      Staphylococcus aureus     ROBB     Clindamycin Susceptible     Erythromycin Susceptible     Inducible Clindamycin Resistance Negative     Oxacillin Susceptible     Penicillin G Resistant     Rifampin Susceptible     Tetracycline Susceptible     Trimethoprim + Sulfamethoxazole Susceptible     Vancomycin Susceptible                Susceptibility Comments     Staphylococcus aureus    This isolate does not demonstrate inducible clindamycin resistance in vitro.               Blood Culture - Blood, Arm, Left [[id number]] Collected:  11/14/19 2354    Lab Status:  Preliminary result Specimen:  Blood from Arm, Left Updated:  11/19/19 0004     Blood Culture No growth at 4 days    Blood Culture - Blood, Arm, Right [[id number]] Collected:  11/14/19 2354    Lab Status:  Preliminary result Specimen:  Blood from Arm, Right Updated:  11/19/19 0004     Blood Culture No growth at 4 days    Wound Culture - Wound, Foot, Right [[id number]]  (Abnormal)  (Susceptibility) Collected:  11/14/19 1545    Lab Status:  Final result Specimen:  Wound from Foot, Right Updated:  11/16/19 0730     Wound Culture Moderate growth (3+) Staphylococcus aureus     Gram Stain No WBCs seen      Occasional Gram positive cocci in clusters    Susceptibility      Staphylococcus aureus     ROBB     Clindamycin Susceptible     Erythromycin Susceptible     Inducible Clindamycin Resistance Negative     Oxacillin Susceptible     Penicillin G Resistant     Rifampin Susceptible     Tetracycline Susceptible     Trimethoprim + Sulfamethoxazole Susceptible     Vancomycin Susceptible                Susceptibility Comments     Staphylococcus aureus    This isolate does not demonstrate inducible clindamycin resistance in vitro.                     ECG/EMG Results (most recent)     None          Results for orders placed during the  hospital encounter of 11/14/19   Doppler Ankle Brachial Index Single Level CAR    Narrative · Right Conclusion: The right MARISELA is normal. Normal digital pressures.  · Left Conclusion: The left MARISELA is normal. Normal digital pressures.             Xr Foot 3+ View Right    Result Date: 11/15/2019   1. Diffuse soft tissue swelling, without radiographic evidence of acute fracture or osteomyelitis. 2. Severe chronic changes in the midfoot, indicating Charcot arthropathy. 3. Moderate to advanced DJD the great toe MTP joint.  Electronically Signed By-David Patel On:11/15/2019 8:53 AM This report was finalized on [id number] by  David Patel, .    Mri Foot Right Without Contrast    Result Date: 11/15/2019   1. Soft tissue wound at the dorsal great toe with diffuse soft tissue edema that likely represents cellulitis. No loculated fluid collection to suggest abscess. 2. No MR evidence of osteomyelitis. 3. Partially imaged severe chronic changes in the midfoot, consistent with Charcot arthropathy. 4. Moderate DJD at the great toe MTP joint. 5. Severe diffuse muscular fatty atrophy, likely neuropathic.    Electronically Signed ByRhea Patel On:11/15/2019 1:00 PM This report was finalized on [id number] by  David Patel, .    Condition on Discharge:    Stable    Discharge Diet: diabetic     Activity at Discharge: As tolerated    Follow-up Appointments  No future appointments.  Additional Instructions for the Follow-ups that You Need to Schedule     Discharge Follow-up with PCP   As directed       Currently Documented PCP:    Tete Blank MD    PCP Phone Number:    [phone]     Follow Up Details:  2 weeks               Test Results Pending at Discharge   Order Current Status    Blood Culture - Blood, Arm, Left Preliminary result    Blood Culture - Blood, Arm, Right Preliminary result           Risk for Readmission (LACE) Score: 10 (11/19/2019  6:00 AM)    Jaxon Campbell DO  11/19/19  5:46 PM       no chills/no decreased eating/drinking/no dizziness/no fever/no nausea/no pain/no tingling/no vomiting/no weakness

## 2025-01-21 ENCOUNTER — TRANSCRIBE ORDERS (OUTPATIENT)
Dept: ADMINISTRATIVE | Facility: HOSPITAL | Age: 65
End: 2025-01-21
Payer: COMMERCIAL

## 2025-01-21 DIAGNOSIS — E11.9 TYPE 2 DIABETES MELLITUS WITHOUT COMPLICATION, UNSPECIFIED WHETHER LONG TERM INSULIN USE: Primary | ICD-10-CM

## 2025-01-24 ENCOUNTER — TRANSCRIBE ORDERS (OUTPATIENT)
Dept: ADMINISTRATIVE | Facility: HOSPITAL | Age: 65
End: 2025-01-24
Payer: COMMERCIAL

## 2025-01-24 DIAGNOSIS — Z13.6 ENCOUNTER FOR SCREENING FOR VASCULAR DISEASE: Primary | ICD-10-CM

## 2025-04-07 ENCOUNTER — HOSPITAL ENCOUNTER (OUTPATIENT)
Dept: CT IMAGING | Facility: HOSPITAL | Age: 65
Discharge: HOME | End: 2025-04-07

## 2025-04-07 ENCOUNTER — HOSPITAL ENCOUNTER (OUTPATIENT)
Dept: CARDIOLOGY | Facility: HOSPITAL | Age: 65
Discharge: HOME | End: 2025-04-07

## 2025-04-07 DIAGNOSIS — E11.9 TYPE 2 DIABETES MELLITUS WITHOUT COMPLICATION, UNSPECIFIED WHETHER LONG TERM INSULIN USE: ICD-10-CM

## 2025-04-07 DIAGNOSIS — Z13.6 ENCOUNTER FOR SCREENING FOR VASCULAR DISEASE: ICD-10-CM

## 2025-04-07 LAB
BH CV VAS SCREENING CAROTID CCA LEFT: 77 CM/SEC
BH CV VAS SCREENING CAROTID CCA RIGHT: 79 CM/SEC
BH CV VAS SCREENING CAROTID ICA LEFT: 51 CM/SEC
BH CV VAS SCREENING CAROTID ICA RIGHT: 60 CM/SEC
BH CV XLRA MEAS - MID AO DIAM: 1.7 CM
BH CV XLRA MEAS - PAD LEFT ABI PT: 1.07
BH CV XLRA MEAS - PAD LEFT ARM: 131 MMHG
BH CV XLRA MEAS - PAD LEFT LEG PT: 140 MMHG
BH CV XLRA MEAS - PAD RIGHT ABI PT: 1.13
BH CV XLRA MEAS - PAD RIGHT ARM: 129 MMHG
BH CV XLRA MEAS - PAD RIGHT LEG PT: 148 MMHG
BH CV XLRA MEAS LEFT DIST CCA EDV: 20.3 CM/SEC
BH CV XLRA MEAS LEFT DIST CCA PSV: 77.4 CM/SEC
BH CV XLRA MEAS LEFT ICA/CCA RATIO: 0.7
BH CV XLRA MEAS LEFT PROX ICA EDV: -16.5 CM/SEC
BH CV XLRA MEAS LEFT PROX ICA PSV: -51 CM/SEC
BH CV XLRA MEAS RIGHT DIST CCA EDV: 19.3 CM/SEC
BH CV XLRA MEAS RIGHT DIST CCA PSV: 78.9 CM/SEC
BH CV XLRA MEAS RIGHT ICA/CCA RATIO: 0.8
BH CV XLRA MEAS RIGHT PROX ICA EDV: -21.4 CM/SEC
BH CV XLRA MEAS RIGHT PROX ICA PSV: -59.8 CM/SEC

## 2025-04-07 PROCEDURE — 93799 UNLISTED CV SVC/PROCEDURE: CPT

## 2025-04-07 PROCEDURE — 75571 CT HRT W/O DYE W/CA TEST: CPT

## 2025-04-07 PROCEDURE — VASCULARSCN2 VASCULAR SCREENING (BUNDLE) CAR: Performed by: INTERNAL MEDICINE

## 2025-05-05 ENCOUNTER — OFFICE VISIT (OUTPATIENT)
Age: 65
End: 2025-05-05
Payer: COMMERCIAL

## 2025-05-05 VITALS — BODY MASS INDEX: 34.72 KG/M2 | HEIGHT: 73 IN | RESPIRATION RATE: 20 BRPM | WEIGHT: 262 LBS

## 2025-05-05 DIAGNOSIS — M14.671 CHARCOT'S JOINT OF RIGHT FOOT: ICD-10-CM

## 2025-05-05 DIAGNOSIS — L97.512 CHRONIC ULCER OF RIGHT FOOT WITH FAT LAYER EXPOSED: Primary | ICD-10-CM

## 2025-05-05 DIAGNOSIS — E11.42 DM TYPE 2 WITH DIABETIC PERIPHERAL NEUROPATHY: ICD-10-CM

## 2025-05-05 RX ORDER — DOXYCYCLINE HYCLATE 100 MG
1 TABLET ORAL EVERY 12 HOURS SCHEDULED
COMMUNITY
Start: 2025-04-22

## 2025-05-05 RX ORDER — METOPROLOL SUCCINATE 100 MG/1
TABLET, EXTENDED RELEASE ORAL
COMMUNITY

## 2025-05-06 NOTE — PROGRESS NOTES
05/05/2025  Foot and Ankle Surgery - Established Patient/Follow-up  Provider: Dr. Robbi Pineda DPM  Location: [facility]    Subjective:  Agapito Canseco is a 64 y.o. male.     Chief Complaint   Patient presents with    Right Foot - Follow-up, Wound Check, Charcot-Lacey-Tooth Disease     Plantar wound    Follow-up     PCP: Tete Blank MD  Last PCP Visit: 1/21/25         History of Present Illness  The patient presents for evaluation of an ulcerated wound on his right foot.    He reports a recurrence of the ulcerated wound on his right foot, which has been present for approximately 1 week. He notes that the left foot is stable. He has been wearing regular shoes, specifically [place] shoes, for the past 2 months. He also mentions the presence of 2 dark areas, one open and the other closed, resembling bruises. He expresses concern about potential tunneling from one area to the other. He has been performing stretching exercises to maintain flexibility. He has been using Crocs at home and hiking boots for outdoor activities. He has a couple of old boots at home. He has been applying Betadine and bandages to the area. He has been using Aquaphor on the callus, which has remained stable. He discontinued doxycycline 2 days ago. He underwent a procedure in 10/2023, which involved tendon lengthening. The procedure was successful, but he experienced some callus formation on the hump of his foot, followed by a series of bruising episodes.    MEDICATIONS  Discontinued: doxycycline      No Known Allergies    Current Outpatient Medications on File Prior to Visit   Medication Sig Dispense Refill    amLODIPine (NORVASC) 10 MG tablet Take 1 tablet by mouth Daily.      aspirin 81 MG chewable tablet Chew 1 tablet Daily.      atorvastatin (LIPITOR) 40 MG tablet Take 1 tablet by mouth Daily.      Continuous Blood Gluc Sensor (FreeStyle Shilo 14 Day Sensor) misc       doxycycline (VIBRAMYICN) 100 MG tablet Take 1 tablet  "by mouth Every 12 (Twelve) Hours.      Dulaglutide (Trulicity) 1.5 MG/0.5ML solution pen-injector Inject 1.5 mg under the skin into the appropriate area as directed 1 (One) Time Per Week. Mondays      Empagliflozin (Jardiance) 25 MG tablet Take 1 tablet by mouth Daily. Do not take dos      HYDROcodone-acetaminophen (NORCO) 7.5-325 MG per tablet Take 1 tablet by mouth Every 6 (Six) Hours As Needed for Moderate Pain (Pain). 28 tablet 0    Insulin Glargine, 2 Unit Dial, (Toujeo Max SoloStar) 300 UNIT/ML solution pen-injector injection Inject 64 Units under the skin into the appropriate area as directed Daily.      losartan (COZAAR) 100 MG tablet Take 1 tablet by mouth Every Evening.      metFORMIN (GLUCOPHAGE) 1000 MG tablet Take 1 tablet by mouth 2 (Two) Times a Day With Meals.      metoprolol succinate XL (TOPROL-XL) 100 MG 24 hr tablet take 1 and 1/2 tablets daily by mouth      pantoprazole (PROTONIX) 40 MG EC tablet Take 1 tablet by mouth Daily. Take dos      pioglitazone (ACTOS) 30 MG tablet Take 1 tablet by mouth Every Evening. Do not take dos      vitamin D (ERGOCALCIFEROL) 1.25 MG (73282 UT) capsule capsule Take 1 capsule by mouth 2 (Two) Times a Week. Sunday and Wednesdays       No current facility-administered medications on file prior to visit.       Objective   Resp 20   Ht 185.4 cm (73\")   Wt 119 kg (262 lb)   BMI 34.57 kg/m²     Foot/Ankle Exam  Physical Exam  General: No acute distress  HEENT: Neck supple, normal oral mucosa, no masses, no lymphadenopathy  Lungs: Clear bilaterally, no wheezing, no crackles, no rhonchi. Equal excursions.   CV - Normal S1/S2, no murmur, regular rate and rhythm   Abdomen - Soft, nontender, nondistended, normal bowel sounds  Extremities - no edema, no erythema  Neuro - No focal weakness, normal sensation  Psych - Alert and oriented x3  Skin - charcot deformity. Right foot in dressing      05/24/2023: Wound is substantially smaller and nearly healed. Mild periwound " hyperkeratotic tissue. No tunneling, tracking or fluctuance. Continued Charcot foot deformity without any obvious progressive changes.     06/13/23 Wound to the plantar aspect of the right foot is much improved with stable eschar formation. No periwound erythema, edema, or signs of infection. Continued equinus contracture with knee extended and flexed.      07/19/2023  Continued open wound involving the plantar aspect of the foot. Wound is granular and measures approximately 1 cm in diameter. No signs of infection. Continued moderate equinus contracture.     08/24/2023  Incision sites to the lateral aspect of the left foot and posterior aspect of the lower leg are dry and stable with intact staples. No evidence of dehiscence or infection. Plantar wound has improved and is nearly healed. No signs of infection or further concern. Wound is skin deep.     09/11/2023:  Incision sites and wound are well healed to the right lower extremity. Mild overlying eschar to the plantar foot wound. No signs of infection or further concern.     10/09/2023  Wound is now healed to the plantar aspect of the midfoot. Incision site to the lateral aspect of the foot is well healed as well as the calf. No discomfort. No significant equinus contracture involving the right lower extremity. No progressive deformity or instability.    5/5/25: Foot structure to the right lower extremity is relatively unchanged as compared to previous assessment.  There is a small superficial wound involving the plantar aspect of the midfoot region measuring approximately 1 cm in diameter with prominent hyperkeratotic periphery.  After debridement, granular wound base that extends to subcutaneous tissue noted.  No signs of infection or further concern.      Results  Laboratory Studies  Last A1c was 7.2.    Imaging  X-ray of the right foot shows stability.    Assessment & Plan   Diagnoses and all orders for this visit:    1. Chronic ulcer of right foot with fat  layer exposed (Primary)    2. Charcot's joint of right foot  -     XR Foot 3+ View Right    3. DM type 2 with diabetic peripheral neuropathy      Assessment & Plan    The ulcerated wound on the right foot is likely due to scar tissue formation. The foot structure, including the skin, subcutaneous tissue, ligaments, and muscles, is compromised, leading to recurrent issues. The x-ray results indicate stability, and there is no significant infection or other concerning factors. The last A1c level was 7.2. The patient was advised to offload the stress on the foot by using a boot and knee scooter, and to limit activities to promote healing. Once the wound is healed, supportive tennis shoes or custom inserts can be considered. The patient was also advised to avoid walking barefoot and to keep the wound clean and dry. A new boot will be provided through insurance. The patient should apply Betadine and a bandage daily, and can shower and bathe as usual, ensuring the wound is kept dry afterward. If the condition does not improve within 2 weeks, further options such as reconstructive surgery may be considered.Reviewed proper basic stretching and manual therapy exercises along with appropriate shoes and activity.  Discussed proper use and/or avoidance of OTC anti-inflammatories.  Patient is to call with any additional issues or concerns.  Greater than 20 minutes was spent before, during, and after evaluation for patient care.    The encounter note is created with the use of AI technology.  I do apologize if there are typos and/or confusion within the note.  Please feel free to contact me or my office with any questions or concerns.    Excisional Debridement to subcutaneous tissue: Right foot    Pre ulcer measurement: 0.5 x 0.5 cm    Post ulcer measurement: 1.0 x 1.0 cm    Anesthesia: None, as patient is neuropathic    Bleeding: <5cc    Pre-op pain: 0    Post-op pain: 0    Complications: None    Consent and time out was  performed before proceeding with the procedure.  Excisional debridement to the level of was performed with a 15 blade and curette without complication.  Viable and nonviable skin, subcutaneous tissue was excised to a healthy bleeding base.  No purulence or proximal extension was identified. Hemostasis was achieved with pressure.  Dry sterile dressings were applied.  Patient tolerated the procedure well.    Follow-up  The patient will follow up in 2 weeks.               Patient or patient representative verbalized consent for the use of Ambient Listening during the visit with  MAX Pineda DPM for chart documentation. 5/6/2025  06:42 EDT    MAX Pineda DPM

## 2025-05-19 ENCOUNTER — OFFICE VISIT (OUTPATIENT)
Age: 65
End: 2025-05-19
Payer: COMMERCIAL

## 2025-05-19 VITALS — WEIGHT: 252 LBS | OXYGEN SATURATION: 97 % | HEIGHT: 73 IN | HEART RATE: 82 BPM | BODY MASS INDEX: 33.4 KG/M2

## 2025-05-19 DIAGNOSIS — E11.42 DM TYPE 2 WITH DIABETIC PERIPHERAL NEUROPATHY: ICD-10-CM

## 2025-05-19 DIAGNOSIS — M14.671 CHARCOT'S JOINT OF RIGHT FOOT: ICD-10-CM

## 2025-05-19 DIAGNOSIS — L84 PRE-ULCERATIVE CALLUSES: Primary | ICD-10-CM

## 2025-05-20 NOTE — PROGRESS NOTES
05/19/2025  Foot and Ankle Surgery - Established Patient/Follow-up  Provider: Dr. Robbi Pineda DPM  Location: [facility]    Subjective:  Agapito Canseco is a 64 y.o. male.     Chief Complaint   Patient presents with    Right Foot - Follow-up, Wound Check, Charcot-Lacey-Tooth Disease    Follow-up     PCP: Tete Blank MD  Last PCP Visit: 1/21/25         History of Present Illness  The patient presents for evaluation of a foot wound.    He reports that the wound appears to be healing, but he is unable to assess the depth of the wound. He has been applying Betadine to the wound and inquires about the necessity of daily dressing changes. He recalls a previous surgical intervention on his heel, which was beneficial. He has not undergone any surgical procedures on the posterior aspect of his heel. He has a history of prolonged use of steel-toe work boots due to his occupation, which involved extensive walking. He has observed a decline in his stability as he ages, necessitating increased caution during activities such as standing on a boat.    MEDICATIONS  Betadine      No Known Allergies    Current Outpatient Medications on File Prior to Visit   Medication Sig Dispense Refill    amLODIPine (NORVASC) 10 MG tablet Take 1 tablet by mouth Daily.      aspirin 81 MG chewable tablet Chew 1 tablet Daily.      atorvastatin (LIPITOR) 40 MG tablet Take 1 tablet by mouth Daily.      Continuous Blood Gluc Sensor (FreeStyle Shilo 14 Day Sensor) misc       doxycycline (VIBRAMYICN) 100 MG tablet Take 1 tablet by mouth Every 12 (Twelve) Hours.      Dulaglutide (Trulicity) 1.5 MG/0.5ML solution pen-injector Inject 1.5 mg under the skin into the appropriate area as directed 1 (One) Time Per Week. Mondays      Empagliflozin (Jardiance) 25 MG tablet Take 1 tablet by mouth Daily. Do not take dos      HYDROcodone-acetaminophen (NORCO) 7.5-325 MG per tablet Take 1 tablet by mouth Every 6 (Six) Hours As Needed for Moderate  "Pain (Pain). 28 tablet 0    Insulin Glargine, 2 Unit Dial, (Toujeo Max SoloStar) 300 UNIT/ML solution pen-injector injection Inject 64 Units under the skin into the appropriate area as directed Daily.      losartan (COZAAR) 100 MG tablet Take 1 tablet by mouth Every Evening.      metFORMIN (GLUCOPHAGE) 1000 MG tablet Take 1 tablet by mouth 2 (Two) Times a Day With Meals.      metoprolol succinate XL (TOPROL-XL) 100 MG 24 hr tablet take 1 and 1/2 tablets daily by mouth      pantoprazole (PROTONIX) 40 MG EC tablet Take 1 tablet by mouth Daily. Take dos      pioglitazone (ACTOS) 30 MG tablet Take 1 tablet by mouth Every Evening. Do not take dos      vitamin D (ERGOCALCIFEROL) 1.25 MG (28479 UT) capsule capsule Take 1 capsule by mouth 2 (Two) Times a Week. Sunday and Wednesdays       No current facility-administered medications on file prior to visit.       Objective   Pulse 82   Ht 185.4 cm (73\")   Wt 114 kg (252 lb)   SpO2 97%   BMI 33.25 kg/m²     Foot/Ankle Exam  Physical Exam  General: No acute distress  HEENT: Neck supple, normal oral mucosa, no masses, no lymphadenopathy  Lungs: Clear bilaterally, no wheezing, no crackles, no rhonchi. Equal excursions.   CV - Normal S1/S2, no murmur, regular rate and rhythm   Abdomen - Soft, nontender, nondistended, normal bowel sounds  Extremities - no edema, no erythema  Neuro - No focal weakness, normal sensation  Psych - Alert and oriented x3  Skin - charcot deformity. Right foot in dressing      05/24/2023: Wound is substantially smaller and nearly healed. Mild periwound hyperkeratotic tissue. No tunneling, tracking or fluctuance. Continued Charcot foot deformity without any obvious progressive changes.     06/13/23 Wound to the plantar aspect of the right foot is much improved with stable eschar formation. No periwound erythema, edema, or signs of infection. Continued equinus contracture with knee extended and flexed.      07/19/2023  Continued open wound involving the " plantar aspect of the foot. Wound is granular and measures approximately 1 cm in diameter. No signs of infection. Continued moderate equinus contracture.     08/24/2023  Incision sites to the lateral aspect of the left foot and posterior aspect of the lower leg are dry and stable with intact staples. No evidence of dehiscence or infection. Plantar wound has improved and is nearly healed. No signs of infection or further concern. Wound is skin deep.     09/11/2023:  Incision sites and wound are well healed to the right lower extremity. Mild overlying eschar to the plantar foot wound. No signs of infection or further concern.     10/09/2023  Wound is now healed to the plantar aspect of the midfoot. Incision site to the lateral aspect of the foot is well healed as well as the calf. No discomfort. No significant equinus contracture involving the right lower extremity. No progressive deformity or instability.     5/5/25: Foot structure to the right lower extremity is relatively unchanged as compared to previous assessment.  There is a small superficial wound involving the plantar aspect of the midfoot region measuring approximately 1 cm in diameter with prominent hyperkeratotic periphery.  After debridement, granular wound base that extends to subcutaneous tissue noted.  No signs of infection or further concern.      Results  Imaging  Weightbearing x-ray of the foot shows no significant changes.    Assessment & Plan   Diagnoses and all orders for this visit:    1. Pre-ulcerative calluses (Primary)    2. Charcot's joint of right foot    3. DM type 2 with diabetic peripheral neuropathy      Assessment & Plan    The wound is superficial, with no discernible depth or cause for concern. The presence of small bony prominences and an excess of scar tissue is noted. His range of motion has improved compared to previous assessments. The potential complications associated with reconstruction surgery were discussed, including  hardware failure, nonunion, delayed union, and the possibility of infection. He was advised to gradually transition back to regular footwear and resume normal activities, while being mindful of increased activity levels and uneven terrain. He was also encouraged to take breaks during strenuous activities. The use of Betadine was deemed unnecessary at this point. He was instructed to apply Aquaphor at night and cover the area with a Band-Aid in the morning. This regimen should be followed intermittently on busier days, with a gradual reduction over time. If any issues arise, he will inform us promptly.Reviewed proper basic stretching and manual therapy exercises along with appropriate shoes and activity.  Discussed proper use and/or avoidance of OTC anti-inflammatories.  Patient is to call with any additional issues or concerns.  Greater than 20 minutes was spent before, during, and after evaluation for patient care.    The encounter note is created with the use of AI technology.  I do apologize if there are typos and/or confusion within the note.  Please feel free to contact me or my office with any questions or concerns.    Follow-up  The patient will follow up in 4 weeks.               Patient or patient representative verbalized consent for the use of Ambient Listening during the visit with  MAX Pineda DPM for chart documentation. 5/20/2025  06:55 EDT    MAX Pineda DPM

## 2025-06-09 ENCOUNTER — OFFICE VISIT (OUTPATIENT)
Age: 65
End: 2025-06-09
Payer: COMMERCIAL

## 2025-06-09 ENCOUNTER — LAB (OUTPATIENT)
Dept: LAB | Facility: HOSPITAL | Age: 65
End: 2025-06-09
Payer: COMMERCIAL

## 2025-06-09 VITALS — HEIGHT: 73 IN | WEIGHT: 252 LBS | BODY MASS INDEX: 33.4 KG/M2 | HEART RATE: 87 BPM | OXYGEN SATURATION: 96 %

## 2025-06-09 DIAGNOSIS — M21.861 ACQUIRED POSTERIOR EQUINUS, RIGHT: ICD-10-CM

## 2025-06-09 DIAGNOSIS — E11.42 DM TYPE 2 WITH DIABETIC PERIPHERAL NEUROPATHY: ICD-10-CM

## 2025-06-09 DIAGNOSIS — L97.512 CHRONIC ULCER OF RIGHT FOOT WITH FAT LAYER EXPOSED: Primary | ICD-10-CM

## 2025-06-09 DIAGNOSIS — M14.671 CHARCOT'S JOINT OF RIGHT FOOT: ICD-10-CM

## 2025-06-09 PROCEDURE — 87077 CULTURE AEROBIC IDENTIFY: CPT | Performed by: PODIATRIST

## 2025-06-09 PROCEDURE — 87070 CULTURE OTHR SPECIMN AEROBIC: CPT | Performed by: PODIATRIST

## 2025-06-09 PROCEDURE — 87147 CULTURE TYPE IMMUNOLOGIC: CPT | Performed by: PODIATRIST

## 2025-06-09 PROCEDURE — 87186 SC STD MICRODIL/AGAR DIL: CPT | Performed by: PODIATRIST

## 2025-06-09 PROCEDURE — 87205 SMEAR GRAM STAIN: CPT | Performed by: PODIATRIST

## 2025-06-09 RX ORDER — DOXYCYCLINE HYCLATE 100 MG
100 TABLET ORAL 2 TIMES DAILY
Qty: 20 TABLET | Refills: 0 | Status: SHIPPED | OUTPATIENT
Start: 2025-06-09 | End: 2025-06-19

## 2025-06-10 ENCOUNTER — RESULTS FOLLOW-UP (OUTPATIENT)
Age: 65
End: 2025-06-10
Payer: COMMERCIAL

## 2025-06-10 RX ORDER — CIPROFLOXACIN 500 MG/1
500 TABLET, FILM COATED ORAL 2 TIMES DAILY
Qty: 20 TABLET | Refills: 0 | Status: SHIPPED | OUTPATIENT
Start: 2025-06-10 | End: 2025-06-20

## 2025-06-10 NOTE — TELEPHONE ENCOUNTER
----- Message from MAX Pineda sent at 6/10/2025  6:48 AM EDT -----  Please call in ciprofloxacin 500mg BID x 10 days and notify patient. Thanks  ----- Message -----  From: Lab, Background User  Sent: 6/10/2025   6:33 AM EDT  To: MAX Pineda DPM

## 2025-06-10 NOTE — PROGRESS NOTES
06/09/2025  Foot and Ankle Surgery - Established Patient/Follow-up  Provider: Dr. Robbi Pineda DPM  Location: [facility]    Subjective:  Agapito Canseco is a 64 y.o. male.     Chief Complaint   Patient presents with    Right Foot - Follow-up, Wound Check, Charcot-Lacey-Tooth Disease     NEW DRAINAGE AND ODOR    Follow-up     PCP: Tete Blank MD  Last PCP Visit: 1/21/25         History of Present Illness  The patient presents for evaluation of a wound infection.    He reports the onset of an infection, characterized by a noticeable odor that was first detected on Friday night. Upon removing his bandage on Saturday evening, he experienced significant relief. He also notes a recent change in temperature. He has been wearing regular footwear since his last visit.    Supplemental Information  He is going to see a cardiologist because his calcium level is extremely high.      No Known Allergies    Current Outpatient Medications on File Prior to Visit   Medication Sig Dispense Refill    amLODIPine (NORVASC) 10 MG tablet Take 1 tablet by mouth Daily.      aspirin 81 MG chewable tablet Chew 1 tablet Daily.      atorvastatin (LIPITOR) 40 MG tablet Take 1 tablet by mouth Daily.      Continuous Blood Gluc Sensor (FreeStyle Shilo 14 Day Sensor) misc       Dulaglutide (Trulicity) 1.5 MG/0.5ML solution pen-injector Inject 1.5 mg under the skin into the appropriate area as directed 1 (One) Time Per Week. Mondays      Empagliflozin (Jardiance) 25 MG tablet Take 1 tablet by mouth Daily. Do not take dos      HYDROcodone-acetaminophen (NORCO) 7.5-325 MG per tablet Take 1 tablet by mouth Every 6 (Six) Hours As Needed for Moderate Pain (Pain). 28 tablet 0    Insulin Glargine, 2 Unit Dial, (Toujeo Max SoloStar) 300 UNIT/ML solution pen-injector injection Inject 64 Units under the skin into the appropriate area as directed Daily.      losartan (COZAAR) 100 MG tablet Take 1 tablet by mouth Every Evening.       "metFORMIN (GLUCOPHAGE) 1000 MG tablet Take 1 tablet by mouth 2 (Two) Times a Day With Meals.      metoprolol succinate XL (TOPROL-XL) 100 MG 24 hr tablet take 1 and 1/2 tablets daily by mouth      pantoprazole (PROTONIX) 40 MG EC tablet Take 1 tablet by mouth Daily. Take dos      pioglitazone (ACTOS) 30 MG tablet Take 1 tablet by mouth Every Evening. Do not take dos      vitamin D (ERGOCALCIFEROL) 1.25 MG (49593 UT) capsule capsule Take 1 capsule by mouth 2 (Two) Times a Week. Sunday and Wednesdays       No current facility-administered medications on file prior to visit.       Objective   Pulse 87   Ht 185.4 cm (73\")   Wt 114 kg (252 lb)   SpO2 96%   BMI 33.25 kg/m²     Foot/Ankle Exam  Physical Exam  General: No acute distress  HEENT: Neck supple, normal oral mucosa, no masses, no lymphadenopathy  Lungs: Clear bilaterally, no wheezing, no crackles, no rhonchi. Equal excursions.   CV - Normal S1/S2, no murmur, regular rate and rhythm   Abdomen - Soft, nontender, nondistended, normal bowel sounds  Extremities - no edema, no erythema  Neuro - No focal weakness, normal sensation  Psych - Alert and oriented x3  Skin - charcot deformity. Right foot in dressing      05/24/2023: Wound is substantially smaller and nearly healed. Mild periwound hyperkeratotic tissue. No tunneling, tracking or fluctuance. Continued Charcot foot deformity without any obvious progressive changes.     06/13/23 Wound to the plantar aspect of the right foot is much improved with stable eschar formation. No periwound erythema, edema, or signs of infection. Continued equinus contracture with knee extended and flexed.      07/19/2023  Continued open wound involving the plantar aspect of the foot. Wound is granular and measures approximately 1 cm in diameter. No signs of infection. Continued moderate equinus contracture.     08/24/2023  Incision sites to the lateral aspect of the left foot and posterior aspect of the lower leg are dry and stable " with intact staples. No evidence of dehiscence or infection. Plantar wound has improved and is nearly healed. No signs of infection or further concern. Wound is skin deep.     09/11/2023:  Incision sites and wound are well healed to the right lower extremity. Mild overlying eschar to the plantar foot wound. No signs of infection or further concern.     10/09/2023  Wound is now healed to the plantar aspect of the midfoot. Incision site to the lateral aspect of the foot is well healed as well as the calf. No discomfort. No significant equinus contracture involving the right lower extremity. No progressive deformity or instability.     5/5/25: Foot structure to the right lower extremity is relatively unchanged as compared to previous assessment.  There is a small superficial wound involving the plantar aspect of the midfoot region measuring approximately 1 cm in diameter with prominent hyperkeratotic periphery.  After debridement, granular wound base that extends to subcutaneous tissue noted.  No signs of infection or further concern.    6/9/25: Wound remains with macerated border.  After debridement, granular wound base without tunneling or tracking.  Wound measures approximately 1 cm in diameter.  No other complicating features.  Foot structure is relatively unchanged.      Results      Assessment & Plan   Diagnoses and all orders for this visit:    1. Chronic ulcer of right foot with fat layer exposed (Primary)  -     XR Foot 3+ View Right  -     Wound Culture - Wound, Foot, Right    2. Charcot's joint of right foot    3. DM type 2 with diabetic peripheral neuropathy    4. Acquired posterior equinus, right    Other orders  -     doxycycline (VIBRAMYICN) 100 MG tablet; Take 1 tablet by mouth 2 (Two) Times a Day for 10 days.  Dispense: 20 tablet; Refill: 0      Assessment & Plan    The wound exhibits a small rim around it, indicative of callus buildup. The healing process appears to be hindered by this callus  formation, which is preventing the wound from healing from the inside out. This could potentially lead to further complications. The presence of scar tissue may also be contributing to the issue by exerting additional pressure on the affected area. A culture will be obtained for further analysis. An antibiotic will be prescribed as a preventive measure against potential infection. He is advised to resume wearing the boot until the wound has completely healed. A new boot will be provided for his use. He is instructed to monitor the wound closely and report any changes or concerns.Reviewed proper basic stretching and manual therapy exercises along with appropriate shoes and activity.  Discussed proper use and/or avoidance of OTC anti-inflammatories.  Patient is to call with any additional issues or concerns.  Greater than 30 minutes was spent before, during, and after evaluation for patient care.    The encounter note is created with the use of AI technology.  I do apologize if there are typos and/or confusion within the note.  Please feel free to contact me or my office with any questions or concerns.    Excisional Debridement to subcutaneous tissue: Right foot    Pre ulcer measurement: 0.5 x 0.5 cm    Post ulcer measurement: 1.0 x 1.0 x 0.8 cm    Anesthesia: None, as patient is neuropathic    Bleeding: <5cc    Pre-op pain: 0    Post-op pain: 0    Complications: None    Consent and time out was performed before proceeding with the procedure.  Excisional debridement to the level of was performed with a 15 blade and curette without complication.  Viable and nonviable skin, subcutaneous tissue was excised to a healthy bleeding base.  No purulence or proximal extension was identified. Hemostasis was achieved with pressure.  Dry sterile dressings were applied.  Patient tolerated the procedure well.      Follow-up  The patient is scheduled for a follow-up visit in 2 weeks.             Patient or patient representative  verbalized consent for the use of Ambient Listening during the visit with  MAX Pineda DPM for chart documentation. 6/10/2025  07:27 EDT    MAX Pineda DPM

## 2025-06-13 LAB
BACTERIA SPEC AEROBE CULT: ABNORMAL
GRAM STN SPEC: ABNORMAL

## 2025-06-23 ENCOUNTER — OFFICE VISIT (OUTPATIENT)
Age: 65
End: 2025-06-23
Payer: COMMERCIAL

## 2025-06-23 VITALS — WEIGHT: 252 LBS | BODY MASS INDEX: 33.4 KG/M2 | RESPIRATION RATE: 20 BRPM | HEIGHT: 73 IN

## 2025-06-23 DIAGNOSIS — M14.671 CHARCOT'S JOINT OF RIGHT FOOT: ICD-10-CM

## 2025-06-23 DIAGNOSIS — E11.42 DM TYPE 2 WITH DIABETIC PERIPHERAL NEUROPATHY: ICD-10-CM

## 2025-06-23 DIAGNOSIS — L97.512 CHRONIC ULCER OF RIGHT FOOT WITH FAT LAYER EXPOSED: Primary | ICD-10-CM

## 2025-06-23 DIAGNOSIS — M21.861 ACQUIRED POSTERIOR EQUINUS, RIGHT: ICD-10-CM

## 2025-06-23 PROCEDURE — 99213 OFFICE O/P EST LOW 20 MIN: CPT | Performed by: PODIATRIST

## 2025-06-23 RX ORDER — SEMAGLUTIDE 1.34 MG/ML
1 INJECTION, SOLUTION SUBCUTANEOUS WEEKLY
COMMUNITY
Start: 2025-06-19

## 2025-06-24 NOTE — PROGRESS NOTES
06/23/2025  Foot and Ankle Surgery - Established Patient/Follow-up  Provider: Dr. Robbi Pineda DPM  Location: [facility]    Subjective:  Agapito Canseco is a 64 y.o. male.     Chief Complaint   Patient presents with    Right Foot - Follow-up, Wound Check, Charcot-Lacey-Tooth Disease    Follow-up     PCP: Tete Blank MD  Last PCP Visit: 5/27/25         History of Present Illness  The patient presents for evaluation of a foot wound.    He reports that the wound appears to be in the process of healing, with new skin formation observed. He has completed his course of antibiotics. He has been monitoring the wound daily and notes a small, faint red spot on the bandage, which he describes as more pink than red. There is also a slight discharge present. He has been leaving the bandage off during the night.      No Known Allergies    Current Outpatient Medications on File Prior to Visit   Medication Sig Dispense Refill    amLODIPine (NORVASC) 10 MG tablet Take 1 tablet by mouth Daily.      aspirin 81 MG chewable tablet Chew 1 tablet Daily.      atorvastatin (LIPITOR) 40 MG tablet Take 1 tablet by mouth Daily.      Continuous Blood Gluc Sensor (FreeStyle Shilo 14 Day Sensor) misc       Dulaglutide (Trulicity) 1.5 MG/0.5ML solution pen-injector Inject 1.5 mg under the skin into the appropriate area as directed 1 (One) Time Per Week. Mondays      Empagliflozin (Jardiance) 25 MG tablet Take 1 tablet by mouth Daily. Do not take dos      Insulin Glargine, 2 Unit Dial, (Toujeo Max SoloStar) 300 UNIT/ML solution pen-injector injection Inject 64 Units under the skin into the appropriate area as directed Daily.      losartan (COZAAR) 100 MG tablet Take 1 tablet by mouth Every Evening.      metFORMIN (GLUCOPHAGE) 1000 MG tablet Take 1 tablet by mouth 2 (Two) Times a Day With Meals.      metoprolol succinate XL (TOPROL-XL) 100 MG 24 hr tablet take 1 and 1/2 tablets daily by mouth      Ozempic, 1 MG/DOSE, 4 MG/3ML  "solution pen-injector Inject 1 mg under the skin into the appropriate area as directed 1 (One) Time Per Week.      pantoprazole (PROTONIX) 40 MG EC tablet Take 1 tablet by mouth Daily. Take dos      pioglitazone (ACTOS) 30 MG tablet Take 1 tablet by mouth Every Evening. Do not take dos      vitamin D (ERGOCALCIFEROL) 1.25 MG (51977 UT) capsule capsule Take 1 capsule by mouth 2 (Two) Times a Week. Sunday and Wednesdays       No current facility-administered medications on file prior to visit.       Objective   Resp 20   Ht 185.4 cm (73\")   Wt 114 kg (252 lb)   BMI 33.25 kg/m²     Foot/Ankle Exam  Physical Exam  General: No acute distress  HEENT: Neck supple, normal oral mucosa, no masses, no lymphadenopathy  Lungs: Clear bilaterally, no wheezing, no crackles, no rhonchi. Equal excursions.   CV - Normal S1/S2, no murmur, regular rate and rhythm   Abdomen - Soft, nontender, nondistended, normal bowel sounds  Extremities - no edema, no erythema  Neuro - No focal weakness, normal sensation  Psych - Alert and oriented x3  Skin - charcot deformity. Right foot in dressing      05/24/2023: Wound is substantially smaller and nearly healed. Mild periwound hyperkeratotic tissue. No tunneling, tracking or fluctuance. Continued Charcot foot deformity without any obvious progressive changes.     06/13/23 Wound to the plantar aspect of the right foot is much improved with stable eschar formation. No periwound erythema, edema, or signs of infection. Continued equinus contracture with knee extended and flexed.      07/19/2023  Continued open wound involving the plantar aspect of the foot. Wound is granular and measures approximately 1 cm in diameter. No signs of infection. Continued moderate equinus contracture.     08/24/2023  Incision sites to the lateral aspect of the left foot and posterior aspect of the lower leg are dry and stable with intact staples. No evidence of dehiscence or infection. Plantar wound has improved and is " nearly healed. No signs of infection or further concern. Wound is skin deep.     09/11/2023:  Incision sites and wound are well healed to the right lower extremity. Mild overlying eschar to the plantar foot wound. No signs of infection or further concern.     10/09/2023  Wound is now healed to the plantar aspect of the midfoot. Incision site to the lateral aspect of the foot is well healed as well as the calf. No discomfort. No significant equinus contracture involving the right lower extremity. No progressive deformity or instability.     5/5/25: Foot structure to the right lower extremity is relatively unchanged as compared to previous assessment.  There is a small superficial wound involving the plantar aspect of the midfoot region measuring approximately 1 cm in diameter with prominent hyperkeratotic periphery.  After debridement, granular wound base that extends to subcutaneous tissue noted.  No signs of infection or further concern.     6/9/25: Wound remains with macerated border.  After debridement, granular wound base without tunneling or tracking.  Wound measures approximately 1 cm in diameter.  No other complicating features.  Foot structure is relatively unchanged.    6/23/25: Wound continues to improve and is nearly healed.  Mild hyperkeratotic tissue.  No signs of infection or further concern.  No progressive deformity or instability involving the foot.      Results      Assessment & Plan   Diagnoses and all orders for this visit:    1. Chronic ulcer of right foot with fat layer exposed (Primary)    2. Charcot's joint of right foot    3. DM type 2 with diabetic peripheral neuropathy    4. Acquired posterior equinus, right      Assessment & Plan    The wound appears to be healing, with no signs of infection. The primary concern is the fluctuating nature of the wound. He has completed his antibiotic course. He is advised to maintain regular moisturization of the wound using Aquaphor, applying it once or  twice daily. The use of Betadine is not recommended at this time. He may gently exfoliate the area with a pumice stone. The use of Carbon X shoes for [place] is suggested to provide better support and reduce stress on the affected area. He is encouraged to gradually transition from the boot to these shoes, provided they are purchased. If not, he should wait until the wound resembles healthy skin before discontinuing the use of the boot. Over the next 4 weeks, he should moderate his activity level. He is instructed to contact the office if there are any changes in his condition.Reviewed proper basic stretching and manual therapy exercises along with appropriate shoes and activity.  Discussed proper use and/or avoidance of OTC anti-inflammatories.  Patient is to call with any additional issues or concerns.  Greater than 20 minutes was spent before, during, and after evaluation for patient care.    The encounter note is created with the use of AI technology.  I do apologize if there are typos and/or confusion within the note.  Please feel free to contact me or my office with any questions or concerns.    Follow-up  The patient will follow up in 4 weeks.             Patient or patient representative verbalized consent for the use of Ambient Listening during the visit with  MAX Pineda DPM for chart documentation. 6/24/2025  07:21 EDT    MAX Pineda DPM

## 2025-07-21 ENCOUNTER — OFFICE VISIT (OUTPATIENT)
Age: 65
End: 2025-07-21
Payer: COMMERCIAL

## 2025-07-21 VITALS — HEIGHT: 73 IN | WEIGHT: 256 LBS | RESPIRATION RATE: 20 BRPM | BODY MASS INDEX: 33.93 KG/M2

## 2025-07-21 DIAGNOSIS — L97.512 CHRONIC ULCER OF RIGHT FOOT WITH FAT LAYER EXPOSED: Primary | ICD-10-CM

## 2025-07-21 DIAGNOSIS — E11.42 DM TYPE 2 WITH DIABETIC PERIPHERAL NEUROPATHY: ICD-10-CM

## 2025-07-21 DIAGNOSIS — M14.671 CHARCOT'S JOINT OF RIGHT FOOT: ICD-10-CM

## 2025-07-22 NOTE — PROGRESS NOTES
07/21/2025  Foot and Ankle Surgery - Established Patient/Follow-up  Provider: Dr. Robbi Pineda DPM  Location: [facility]    Subjective:  Agapito Canseco is a 64 y.o. male.     Chief Complaint   Patient presents with    Right Foot - Follow-up, Wound Check, Charcot-Lacey-Tooth Disease    Follow-up     PCP: Tete Blank MD  Last PCP Visit: 5/27/25         History of Present Illness  The patient presents for evaluation of a foot wound.    He reports an increase in drainage from the wound this morning, which he did not cover with a bandage after showering. Upon removing the bandage last night, he noticed a slight odor but does not believe it to be indicative of an infection. He has been using a boot for mobility and was advised to transition to a different shoe. However, he was unable to find the recommended shoe at [employer] and instead purchased a [employer] shoe, which he finds comfortable. He has been alternating between the boot and the new shoe since last week. He has also been engaging in fishing activities while seated in his car to avoid putting weight on the foot. He has attempted to reduce the callus using a stone but found it ineffective. He recalls a previous surgery on the side of his foot that resulted in significant improvement within 6 to 8 weeks. He has a history of tendon replacement in his other foot, which he considers to be in better condition than the current one. He completed a course of amoxicillin last Wednesday, which was prescribed following a tooth implant surgery.    His A1c level is well-controlled at 7 or slightly below, and he has lost some weight.    MEDICATIONS  Past: Amoxicillin      No Known Allergies    Current Outpatient Medications on File Prior to Visit   Medication Sig Dispense Refill    amLODIPine (NORVASC) 10 MG tablet Take 1 tablet by mouth Daily.      aspirin 81 MG chewable tablet Chew 1 tablet Daily.      atorvastatin (LIPITOR) 40 MG tablet Take  "1 tablet by mouth Daily.      Continuous Blood Gluc Sensor (FreeStyle Shilo 14 Day Sensor) misc       Dulaglutide (Trulicity) 1.5 MG/0.5ML solution pen-injector Inject 1.5 mg under the skin into the appropriate area as directed 1 (One) Time Per Week. Mondays      Empagliflozin (Jardiance) 25 MG tablet Take 1 tablet by mouth Daily. Do not take dos      Insulin Glargine, 2 Unit Dial, (Toujeo Max SoloStar) 300 UNIT/ML solution pen-injector injection Inject 64 Units under the skin into the appropriate area as directed Daily.      losartan (COZAAR) 100 MG tablet Take 1 tablet by mouth Every Evening.      metFORMIN (GLUCOPHAGE) 1000 MG tablet Take 1 tablet by mouth 2 (Two) Times a Day With Meals.      metoprolol succinate XL (TOPROL-XL) 100 MG 24 hr tablet take 1 and 1/2 tablets daily by mouth      Ozempic, 1 MG/DOSE, 4 MG/3ML solution pen-injector Inject 1 mg under the skin into the appropriate area as directed 1 (One) Time Per Week.      pantoprazole (PROTONIX) 40 MG EC tablet Take 1 tablet by mouth Daily. Take dos      pioglitazone (ACTOS) 30 MG tablet Take 1 tablet by mouth Every Evening. Do not take dos      vitamin D (ERGOCALCIFEROL) 1.25 MG (18665 UT) capsule capsule Take 1 capsule by mouth 2 (Two) Times a Week. Sunday and Wednesdays       No current facility-administered medications on file prior to visit.       Objective   Resp 20   Ht 185.4 cm (73\")   Wt 116 kg (256 lb)   BMI 33.78 kg/m²     Foot/Ankle Exam  Physical Exam  General: No acute distress  HEENT: Neck supple, normal oral mucosa, no masses, no lymphadenopathy  Lungs: Clear bilaterally, no wheezing, no crackles, no rhonchi. Equal excursions.   CV - Normal S1/S2, no murmur, regular rate and rhythm   Abdomen - Soft, nontender, nondistended, normal bowel sounds  Extremities - no edema, no erythema  Neuro - No focal weakness, normal sensation  Psych - Alert and oriented x3  Skin - charcot deformity. Right foot in dressing      05/24/2023: Wound is " substantially smaller and nearly healed. Mild periwound hyperkeratotic tissue. No tunneling, tracking or fluctuance. Continued Charcot foot deformity without any obvious progressive changes.     06/13/23 Wound to the plantar aspect of the right foot is much improved with stable eschar formation. No periwound erythema, edema, or signs of infection. Continued equinus contracture with knee extended and flexed.      07/19/2023  Continued open wound involving the plantar aspect of the foot. Wound is granular and measures approximately 1 cm in diameter. No signs of infection. Continued moderate equinus contracture.     08/24/2023  Incision sites to the lateral aspect of the left foot and posterior aspect of the lower leg are dry and stable with intact staples. No evidence of dehiscence or infection. Plantar wound has improved and is nearly healed. No signs of infection or further concern. Wound is skin deep.     09/11/2023:  Incision sites and wound are well healed to the right lower extremity. Mild overlying eschar to the plantar foot wound. No signs of infection or further concern.     10/09/2023  Wound is now healed to the plantar aspect of the midfoot. Incision site to the lateral aspect of the foot is well healed as well as the calf. No discomfort. No significant equinus contracture involving the right lower extremity. No progressive deformity or instability.     5/5/25: Foot structure to the right lower extremity is relatively unchanged as compared to previous assessment.  There is a small superficial wound involving the plantar aspect of the midfoot region measuring approximately 1 cm in diameter with prominent hyperkeratotic periphery.  After debridement, granular wound base that extends to subcutaneous tissue noted.  No signs of infection or further concern.     6/9/25: Wound remains with macerated border.  After debridement, granular wound base without tunneling or tracking.  Wound measures approximately 1 cm in  diameter.  No other complicating features.  Foot structure is relatively unchanged.     6/23/25: Wound continues to improve and is nearly healed.  Mild hyperkeratotic tissue.  No signs of infection or further concern.  No progressive deformity or instability involving the foot.    7/21/25: Continued wound involving the plantar aspect of the midfoot.  Wound extends to subcutaneous tissue and measures approximately 1 cm in diameter.  Granular wound base without any signs of infection or further concern.  Foot structure is relatively unchanged.  No significant equinus contracture noted on exam today.  He does have bony prominence involving the plantar aspect of the midfoot likely causing the recurrent wound.      Results  Laboratory Studies  A1C is 7 or slightly below.    Assessment & Plan   Diagnoses and all orders for this visit:    1. Chronic ulcer of right foot with fat layer exposed (Primary)    2. Charcot's joint of right foot    3. DM type 2 with diabetic peripheral neuropathy      Assessment & Plan    The foot wound is likely due to increased activity rather than an infection. The wound appears to be healing but is under significant stress, causing fluid accumulation. His A1C levels are well-managed, indicating good overall health. The wound was nearly healed during the last visit, but a slight increase in activity has reopened it. He has a global Charcot foot deformity, which complicates treatment options. The primary goal is to heal the wound, but recurrence is a concern. Two treatment options were discussed: continuing the current management plan or considering additional surgery to remove more bone and scar tissue to reduce prominence and stabilize the foot. The potential risks and benefits of each option were thoroughly explained. He was advised to monitor his activity levels closely and continue with Betadine dressings. If the wound worsens or becomes infected, he should contact the clinic  immediately.    His A1C levels are well-controlled at around 7 or slightly below. He has also lost a few pounds, which is beneficial for his overall health. Continued monitoring of blood glucose levels and maintaining a balanced diet were recommended.Reviewed proper basic stretching and manual therapy exercises along with appropriate shoes and activity.  Discussed proper use and/or avoidance of OTC anti-inflammatories.  Patient is to call with any additional issues or concerns.  Greater than 20 minutes was spent before, during, and after evaluation for patient care.    The encounter note is created with the use of AI technology.  I do apologize if there are typos and/or confusion within the note.  Please feel free to contact me or my office with any questions or concerns.    Follow-up  A follow-up appointment is scheduled for 6 weeks from now.             Patient or patient representative verbalized consent for the use of Ambient Listening during the visit with  MAX Pineda DPM for chart documentation. 7/22/2025  06:51 EDT    MAX Pineda DPM

## 2025-08-25 PROBLEM — E11.610 DIABETIC CHARCOT FOOT: Status: ACTIVE | Noted: 2025-08-25

## 2025-08-26 PROBLEM — L97.513 RIGHT FOOT ULCER, WITH NECROSIS OF MUSCLE: Status: ACTIVE | Noted: 2021-01-21

## 2025-08-27 ENCOUNTER — ANESTHESIA (OUTPATIENT)
Dept: PERIOP | Facility: HOSPITAL | Age: 65
End: 2025-08-27
Payer: COMMERCIAL

## 2025-08-27 ENCOUNTER — ANESTHESIA EVENT (OUTPATIENT)
Dept: PERIOP | Facility: HOSPITAL | Age: 65
End: 2025-08-27
Payer: COMMERCIAL

## 2025-08-27 PROCEDURE — 25010000002 FENTANYL CITRATE (PF) 100 MCG/2ML SOLUTION

## 2025-08-27 PROCEDURE — 25010000002 PROPOFOL 200 MG/20ML EMULSION

## 2025-08-27 PROCEDURE — 25010000002 ONDANSETRON PER 1 MG

## 2025-08-27 PROCEDURE — 25010000002 LIDOCAINE PF 1% 1 % SOLUTION

## 2025-08-27 PROCEDURE — 25810000003 LACTATED RINGERS PER 1000 ML

## 2025-08-27 PROCEDURE — 25010000002 DEXAMETHASONE PER 1 MG

## 2025-08-27 RX ORDER — ONDANSETRON 2 MG/ML
INJECTION INTRAMUSCULAR; INTRAVENOUS AS NEEDED
Status: DISCONTINUED | OUTPATIENT
Start: 2025-08-27 | End: 2025-08-27 | Stop reason: SURG

## 2025-08-27 RX ORDER — DEXAMETHASONE SODIUM PHOSPHATE 4 MG/ML
INJECTION, SOLUTION INTRA-ARTICULAR; INTRALESIONAL; INTRAMUSCULAR; INTRAVENOUS; SOFT TISSUE AS NEEDED
Status: DISCONTINUED | OUTPATIENT
Start: 2025-08-27 | End: 2025-08-27 | Stop reason: SURG

## 2025-08-27 RX ORDER — SODIUM CHLORIDE, SODIUM LACTATE, POTASSIUM CHLORIDE, CALCIUM CHLORIDE 600; 310; 30; 20 MG/100ML; MG/100ML; MG/100ML; MG/100ML
INJECTION, SOLUTION INTRAVENOUS CONTINUOUS PRN
Status: DISCONTINUED | OUTPATIENT
Start: 2025-08-27 | End: 2025-08-27 | Stop reason: SURG

## 2025-08-27 RX ORDER — LIDOCAINE HYDROCHLORIDE 10 MG/ML
INJECTION, SOLUTION EPIDURAL; INFILTRATION; INTRACAUDAL; PERINEURAL AS NEEDED
Status: DISCONTINUED | OUTPATIENT
Start: 2025-08-27 | End: 2025-08-27 | Stop reason: SURG

## 2025-08-27 RX ORDER — FENTANYL CITRATE 50 UG/ML
INJECTION, SOLUTION INTRAMUSCULAR; INTRAVENOUS AS NEEDED
Status: DISCONTINUED | OUTPATIENT
Start: 2025-08-27 | End: 2025-08-27 | Stop reason: SURG

## 2025-08-27 RX ORDER — PROPOFOL 10 MG/ML
INJECTION, EMULSION INTRAVENOUS AS NEEDED
Status: DISCONTINUED | OUTPATIENT
Start: 2025-08-27 | End: 2025-08-27 | Stop reason: SURG

## 2025-08-27 RX ADMIN — SODIUM CHLORIDE, SODIUM LACTATE, POTASSIUM CHLORIDE, AND CALCIUM CHLORIDE: .6; .31; .03; .02 INJECTION, SOLUTION INTRAVENOUS at 15:53

## 2025-08-27 RX ADMIN — DEXAMETHASONE SODIUM PHOSPHATE 4 MG: 4 INJECTION, SOLUTION INTRAMUSCULAR; INTRAVENOUS at 16:03

## 2025-08-27 RX ADMIN — METRONIDAZOLE 500 MG: 500 INJECTION, SOLUTION INTRAVENOUS at 16:05

## 2025-08-27 RX ADMIN — LIDOCAINE HYDROCHLORIDE 50 MG: 10 INJECTION, SOLUTION EPIDURAL; INFILTRATION; INTRACAUDAL; PERINEURAL at 15:58

## 2025-08-27 RX ADMIN — ONDANSETRON 4 MG: 2 INJECTION INTRAMUSCULAR; INTRAVENOUS at 16:03

## 2025-08-27 RX ADMIN — FENTANYL CITRATE 50 MCG: 50 INJECTION, SOLUTION INTRAMUSCULAR; INTRAVENOUS at 16:19

## 2025-08-27 RX ADMIN — FENTANYL CITRATE 50 MCG: 50 INJECTION, SOLUTION INTRAMUSCULAR; INTRAVENOUS at 16:03

## 2025-08-27 RX ADMIN — PROPOFOL 200 MG: 10 INJECTION, EMULSION INTRAVENOUS at 15:58

## 2025-08-29 ENCOUNTER — ANESTHESIA (OUTPATIENT)
Dept: PERIOP | Facility: HOSPITAL | Age: 65
End: 2025-08-29
Payer: COMMERCIAL

## 2025-08-29 ENCOUNTER — ANESTHESIA EVENT (OUTPATIENT)
Dept: PERIOP | Facility: HOSPITAL | Age: 65
End: 2025-08-29
Payer: COMMERCIAL

## 2025-08-29 PROCEDURE — 25010000002 SUCCINYLCHOLINE PER 20 MG: Performed by: NURSE ANESTHETIST, CERTIFIED REGISTERED

## 2025-08-29 PROCEDURE — 25010000002 ONDANSETRON PER 1 MG: Performed by: NURSE ANESTHETIST, CERTIFIED REGISTERED

## 2025-08-29 PROCEDURE — 25010000002 FENTANYL CITRATE (PF) 50 MCG/ML SOLUTION: Performed by: NURSE ANESTHETIST, CERTIFIED REGISTERED

## 2025-08-29 PROCEDURE — 25010000002 LIDOCAINE PF 2% 2 % SOLUTION: Performed by: NURSE ANESTHETIST, CERTIFIED REGISTERED

## 2025-08-29 PROCEDURE — 25810000003 SODIUM CHLORIDE 0.9 % SOLUTION: Performed by: NURSE ANESTHETIST, CERTIFIED REGISTERED

## 2025-08-29 PROCEDURE — 25010000002 PROPOFOL 10 MG/ML EMULSION: Performed by: NURSE ANESTHETIST, CERTIFIED REGISTERED

## 2025-08-29 RX ORDER — FENTANYL CITRATE 50 UG/ML
INJECTION, SOLUTION INTRAMUSCULAR; INTRAVENOUS AS NEEDED
Status: DISCONTINUED | OUTPATIENT
Start: 2025-08-29 | End: 2025-08-29 | Stop reason: SURG

## 2025-08-29 RX ORDER — ONDANSETRON 2 MG/ML
INJECTION INTRAMUSCULAR; INTRAVENOUS AS NEEDED
Status: DISCONTINUED | OUTPATIENT
Start: 2025-08-29 | End: 2025-08-29 | Stop reason: SURG

## 2025-08-29 RX ORDER — SODIUM CHLORIDE 9 MG/ML
INJECTION, SOLUTION INTRAVENOUS CONTINUOUS PRN
Status: DISCONTINUED | OUTPATIENT
Start: 2025-08-29 | End: 2025-08-29 | Stop reason: SURG

## 2025-08-29 RX ORDER — PROPOFOL 10 MG/ML
VIAL (ML) INTRAVENOUS AS NEEDED
Status: DISCONTINUED | OUTPATIENT
Start: 2025-08-29 | End: 2025-08-29 | Stop reason: SURG

## 2025-08-29 RX ORDER — LIDOCAINE HYDROCHLORIDE 20 MG/ML
INJECTION, SOLUTION EPIDURAL; INFILTRATION; INTRACAUDAL; PERINEURAL AS NEEDED
Status: DISCONTINUED | OUTPATIENT
Start: 2025-08-29 | End: 2025-08-29 | Stop reason: SURG

## 2025-08-29 RX ORDER — PHENYLEPHRINE HCL IN 0.9% NACL 1 MG/10 ML
SYRINGE (ML) INTRAVENOUS AS NEEDED
Status: DISCONTINUED | OUTPATIENT
Start: 2025-08-29 | End: 2025-08-29 | Stop reason: SURG

## 2025-08-29 RX ORDER — SUCCINYLCHOLINE CHLORIDE 20 MG/ML
INJECTION INTRAMUSCULAR; INTRAVENOUS AS NEEDED
Status: DISCONTINUED | OUTPATIENT
Start: 2025-08-29 | End: 2025-08-29 | Stop reason: SURG

## 2025-08-29 RX ADMIN — Medication 100 MCG: at 12:39

## 2025-08-29 RX ADMIN — CEFEPIME 2000 MG: 2 INJECTION, POWDER, FOR SOLUTION INTRAVENOUS at 12:50

## 2025-08-29 RX ADMIN — PROPOFOL 200 MG: 10 INJECTION, EMULSION INTRAVENOUS at 12:23

## 2025-08-29 RX ADMIN — Medication 100 MCG: at 12:48

## 2025-08-29 RX ADMIN — FENTANYL CITRATE 100 MCG: 50 INJECTION, SOLUTION INTRAMUSCULAR; INTRAVENOUS at 12:18

## 2025-08-29 RX ADMIN — SUCCINYLCHOLINE CHLORIDE 160 MG: 20 INJECTION, SOLUTION INTRAMUSCULAR; INTRAVENOUS at 12:23

## 2025-08-29 RX ADMIN — ONDANSETRON 4 MG: 2 INJECTION INTRAMUSCULAR; INTRAVENOUS at 12:49

## 2025-08-29 RX ADMIN — LIDOCAINE HYDROCHLORIDE 100 MG: 20 INJECTION, SOLUTION EPIDURAL; INFILTRATION; INTRACAUDAL; PERINEURAL at 12:23

## 2025-08-29 RX ADMIN — Medication 100 MCG: at 12:33

## 2025-08-29 RX ADMIN — SODIUM CHLORIDE: 9 INJECTION, SOLUTION INTRAVENOUS at 12:00

## (undated) DEVICE — INTENDED FOR TISSUE SEPARATION, AND OTHER PROCEDURES THAT REQUIRE A SHARP SURGICAL BLADE TO PUNCTURE OR CUT.: Brand: BARD-PARKER ® CARBON RIB-BACK BLADES

## (undated) DEVICE — ANTIBACTERIAL UNDYED BRAIDED (POLYGLACTIN 910), SYNTHETIC ABSORBABLE SUTURE: Brand: COATED VICRYL

## (undated) DEVICE — DISPOSABLE TOURNIQUET CUFF 30"X4", 1-LINE, WHITE, STERILE, 1EA/PK, 10PK/CS: Brand: ASP MEDICAL

## (undated) DEVICE — CONTAINER,SPECIMEN,OR STERILE,4OZ: Brand: MEDLINE

## (undated) DEVICE — WET SKIN PREP TRAY: Brand: MEDLINE INDUSTRIES, INC.

## (undated) DEVICE — PK EXTREM 50

## (undated) DEVICE — CUFF TOURNI 1BLADDER 1PRT 18IN STRL

## (undated) DEVICE — GLV SURG BIOGEL M LTX PF 7 1/2

## (undated) DEVICE — PAD UNDERCAST WYTEX 4IN 4YD LF STRL

## (undated) DEVICE — BNDG ELAS ELITE V/CLOSE 4IN 5YD LF STRL

## (undated) DEVICE — 1010 S-DRAPE TOWEL DRAPE 10/BX: Brand: STERI-DRAPE™

## (undated) DEVICE — BNDG ESMARK 4IN 12FT LF STRL BLU

## (undated) DEVICE — CUFF TOURNI 1BLADDER 1PRT 34IN STRL

## (undated) DEVICE — DRSNG WND GZ CURAD OIL EMULSION 3X3IN STRL

## (undated) DEVICE — SPNG GZ WOVN 4X4IN 12PLY 10/BX STRL

## (undated) DEVICE — SOL IRRIG NACL 1000ML

## (undated) DEVICE — SOL IRRIG SOD CHL 0.9PCT 3000ML

## (undated) DEVICE — GAUZE,SPONGE,FLUFF,6"X6.75",STRL,5/TRAY: Brand: MEDLINE

## (undated) DEVICE — NDL HYPO PRECISIONGLIDE/REG 18G 1IN PNK

## (undated) DEVICE — SUT ETHLN 2/0 PS 18IN 585H

## (undated) DEVICE — ADHS LIQ MASTISOL 2/3ML

## (undated) DEVICE — SOL IRRIG H2O 1000ML STRL

## (undated) DEVICE — BNDG ELAS MATRX V/CLS 4IN 5YD LF

## (undated) DEVICE — DECANTER: Brand: UNBRANDED

## (undated) DEVICE — MICRO SAGITTAL BLADE (9.5 X 0.4 X 25.5MM)

## (undated) DEVICE — DRSNG GZ CURAD XEROFORM PETROLTM OVERWRP 1X8IN STRL

## (undated) DEVICE — BANDAGE,GAUZE,BULKEE II,4.5"X4.1YD,STRL: Brand: MEDLINE

## (undated) DEVICE — UNDERGLV SURG BIOGEL INDICAT PI SZ8 BLU

## (undated) DEVICE — NDL BIOP BONE MARRW JAMSHIDI 11G 101MM

## (undated) DEVICE — CVR HNDL LT SURG ACCSSRY BLU STRL

## (undated) DEVICE — SOLUTION,WATER,IRRIGATION,1000ML,STERILE: Brand: MEDLINE

## (undated) DEVICE — SOL IRR NACL 0.9PCT ARTHROMATIC 3000ML

## (undated) DEVICE — SPNG GZ AVANT 6PLY 4X4IN STRL PK/2

## (undated) DEVICE — ESWAB LQ AMIES  MINITIP NYLON FLOCKED APPLICATOR: Brand: ESWAB™

## (undated) DEVICE — SLV SCD CALF HEMOFORCE DVT THERP REPROC MD

## (undated) DEVICE — KT SURG TURNOVER 050

## (undated) DEVICE — STAPLER, SKIN, 35W, A: Brand: MEDLINE INDUSTRIES, INC.

## (undated) DEVICE — PROXIMATE RH ROTATING HEAD SKIN STAPLERS (35 WIDE) CONTAINS 35 STAINLESS STEEL STAPLES: Brand: PROXIMATE

## (undated) DEVICE — PENCL HND ROCKRSWTCH HOLSTR EZ CLEAN TP CRD 10FT

## (undated) DEVICE — EPF KIT: Brand: ENDOTRAC

## (undated) DEVICE — PAD,ABDOMINAL,5"X9",STERILE,LF,1/PK: Brand: MEDLINE INDUSTRIES, INC.

## (undated) DEVICE — SUT ETHLN 4/0 PS2 18IN 1667H

## (undated) DEVICE — NDL HYPO PRECISIONGLIDE/REG 18G 11/2 PNK

## (undated) DEVICE — DRAPE,U/ SHT,SPLIT,PLAS,STERIL: Brand: MEDLINE

## (undated) DEVICE — APPL CHLORAPREP HI/LITE TINTED 10.5ML ORNG

## (undated) DEVICE — GOWN,REINFORCE,POLY,SIRUS,BREATH SLV,XLG: Brand: MEDLINE